# Patient Record
Sex: FEMALE | Race: BLACK OR AFRICAN AMERICAN | NOT HISPANIC OR LATINO | Employment: FULL TIME | URBAN - METROPOLITAN AREA
[De-identification: names, ages, dates, MRNs, and addresses within clinical notes are randomized per-mention and may not be internally consistent; named-entity substitution may affect disease eponyms.]

---

## 2018-01-21 ENCOUNTER — HOSPITAL ENCOUNTER (EMERGENCY)
Facility: HOSPITAL | Age: 43
Discharge: HOME | End: 2018-01-21
Attending: EMERGENCY MEDICINE
Payer: MEDICAID

## 2018-01-21 VITALS
RESPIRATION RATE: 18 BRPM | TEMPERATURE: 98 F | HEART RATE: 94 BPM | BODY MASS INDEX: 30.9 KG/M2 | WEIGHT: 180 LBS | SYSTOLIC BLOOD PRESSURE: 128 MMHG | OXYGEN SATURATION: 100 % | DIASTOLIC BLOOD PRESSURE: 73 MMHG

## 2018-01-21 DIAGNOSIS — N93.8 DYSFUNCTIONAL UTERINE BLEEDING: Primary | ICD-10-CM

## 2018-01-21 LAB
B-HCG UR QL: NEGATIVE
BACTERIA #/AREA URNS AUTO: ABNORMAL /HPF
BACTERIA GENITAL QL WET PREP: ABNORMAL
BASOPHILS # BLD AUTO: 0.04 K/UL
BASOPHILS NFR BLD: 0.6 %
BILIRUB UR QL STRIP: NEGATIVE
CLARITY UR REFRACT.AUTO: CLEAR
CLUE CELLS VAG QL WET PREP: ABNORMAL
COLOR UR AUTO: ABNORMAL
DIFFERENTIAL METHOD: ABNORMAL
EOSINOPHIL # BLD AUTO: 0.1 K/UL
EOSINOPHIL NFR BLD: 0.9 %
ERYTHROCYTE [DISTWIDTH] IN BLOOD BY AUTOMATED COUNT: 14.3 %
FILAMENT FUNGI VAG WET PREP-#/AREA: ABNORMAL
GLUCOSE UR QL STRIP: NEGATIVE
HCT VFR BLD AUTO: 35.7 %
HGB BLD-MCNC: 11.8 G/DL
HGB UR QL STRIP: ABNORMAL
HYALINE CASTS UR QL AUTO: 0 /LPF
KETONES UR QL STRIP: ABNORMAL
LEUKOCYTE ESTERASE UR QL STRIP: ABNORMAL
LYMPHOCYTES # BLD AUTO: 1.9 K/UL
LYMPHOCYTES NFR BLD: 28 %
MCH RBC QN AUTO: 31.1 PG
MCHC RBC AUTO-ENTMCNC: 33.1 G/DL
MCV RBC AUTO: 94 FL
MICROSCOPIC COMMENT: ABNORMAL
MONOCYTES # BLD AUTO: 0.7 K/UL
MONOCYTES NFR BLD: 10.4 %
NEUTROPHILS # BLD AUTO: 4.1 K/UL
NEUTROPHILS NFR BLD: 60 %
NITRITE UR QL STRIP: NEGATIVE
PH UR STRIP: 8 [PH] (ref 5–8)
PLATELET # BLD AUTO: 389 K/UL
PMV BLD AUTO: 11 FL
PROT UR QL STRIP: ABNORMAL
RBC # BLD AUTO: 3.8 M/UL
RBC #/AREA URNS AUTO: >100 /HPF (ref 0–4)
SP GR UR STRIP: 1.01 (ref 1–1.03)
SPECIMEN SOURCE: ABNORMAL
T VAGINALIS GENITAL QL WET PREP: ABNORMAL
URN SPEC COLLECT METH UR: ABNORMAL
UROBILINOGEN UR STRIP-ACNC: ABNORMAL EU/DL
WBC # BLD AUTO: 6.85 K/UL
WBC #/AREA URNS AUTO: 5 /HPF (ref 0–5)
WBC #/AREA VAG WET PREP: ABNORMAL
YEAST GENITAL QL WET PREP: ABNORMAL

## 2018-01-21 PROCEDURE — 87210 SMEAR WET MOUNT SALINE/INK: CPT

## 2018-01-21 PROCEDURE — 99284 EMERGENCY DEPT VISIT MOD MDM: CPT

## 2018-01-21 PROCEDURE — 85025 COMPLETE CBC W/AUTO DIFF WBC: CPT

## 2018-01-21 PROCEDURE — 81000 URINALYSIS NONAUTO W/SCOPE: CPT

## 2018-01-21 PROCEDURE — 81025 URINE PREGNANCY TEST: CPT

## 2018-01-21 RX ORDER — PRAZOSIN HYDROCHLORIDE 1 MG/1
1 CAPSULE ORAL 2 TIMES DAILY
COMMUNITY

## 2018-01-21 RX ORDER — TRAZODONE HYDROCHLORIDE 50 MG/1
50 TABLET ORAL NIGHTLY
COMMUNITY

## 2018-01-21 NOTE — ED PROVIDER NOTES
Encounter Date: 2018       History     Chief Complaint   Patient presents with    Fibroids     pt reports pain to fibroid tumors and excessive bleeding x 2 days that has progressively worsened. Denies N/V/D. Pt reports saturating 4-5 pads/hour.     This patient is a 42-year-old female.  Presents to the emergency department complaining of vaginal bleeding for the last 6 days.  She has a history of dysfunctional uterine bleeding secondary to fibroids, and reports that on average she has 2 episodes of bleeding per month.  She has no appointment with her OB/GYN doctor tomorrow.  She has received blood transfusions 3 times in the past due to heavy bleeding, the most recent was about 2 years ago.  She is  5 para 3024, 3 gestations delivered by , one was twins.  2 spontaneous miscarriages.  Has had a BTL.          Review of patient's allergies indicates:  No Known Allergies  Past Medical History:   Diagnosis Date    Anemia     Fibroid (bleeding) (uterine)     GERD (gastroesophageal reflux disease)     Hypertension      Past Surgical History:   Procedure Laterality Date     SECTION       No family history on file.  Social History   Substance Use Topics    Smoking status: Current Some Day Smoker     Packs/day: 0.50     Types: Cigarettes    Smokeless tobacco: Not on file    Alcohol use Not on file     Review of Systems   Constitutional: Negative for chills and fever.   Genitourinary: Positive for pelvic pain and vaginal bleeding.       Physical Exam     Initial Vitals [18 1612]   BP Pulse Resp Temp SpO2   128/66 96 (!) 21 98.4 °F (36.9 °C) 100 %      MAP       86.67         Physical Exam    Nursing note and vitals reviewed.  Constitutional: She appears well-developed and well-nourished.   Obese female in no apparent distress   Abdominal: She exhibits no distension. There is tenderness (Suprapubic tenderness).   Genitourinary: There is no rash, tenderness or lesion on the right  labia. There is no rash, tenderness or lesion on the left labia. Uterus is enlarged and tender. Cervix exhibits no discharge and no friability. Right adnexum displays no mass, no tenderness and no fullness. Left adnexum displays no mass, no tenderness and no fullness. There is bleeding in the vagina.   Genitourinary Comments: Uterine fundus is irregular, tender, 16 - 18 weeks size   Skin: Skin is warm and dry.   Psychiatric: She has a normal mood and affect.         ED Course   Procedures  Labs Reviewed   VAGINAL SCREEN - Abnormal; Notable for the following:        Result Value    WBC - Vaginal Screen Occasional (*)     Bacteria - Vaginal Screen Occasional (*)     All other components within normal limits   URINALYSIS - Abnormal; Notable for the following:     Protein, UA 1+ (*)     Ketones, UA 1+ (*)     Occult Blood UA 3+ (*)     Urobilinogen, UA 4.0-6.0 (*)     Leukocytes, UA 1+ (*)     All other components within normal limits   CBC W/ AUTO DIFFERENTIAL - Abnormal; Notable for the following:     RBC 3.80 (*)     Hemoglobin 11.8 (*)     Hematocrit 35.7 (*)     MCH 31.1 (*)     Platelets 389 (*)     All other components within normal limits   URINALYSIS MICROSCOPIC - Abnormal; Notable for the following:     RBC, UA >100 (*)     All other components within normal limits   PREGNANCY TEST, URINE RAPID         Results for orders placed or performed during the hospital encounter of 01/21/18   Vaginal Screen Vagina   Result Value Ref Range    Trichomonas None None    Clue Cells, Wet Prep None None    Budding Yeast None None    Fungal Hyphae None None    WBC - Vaginal Screen Occasional (A) None    Bacteria - Vaginal Screen Occasional (A) None    Wet Prep Source Vagina None   Urinalysis   Result Value Ref Range    Specimen UA Urine, Clean Catch     Color, UA Michaela Yellow, Straw, Michaela    Appearance, UA Clear Clear    pH, UA 8.0 5.0 - 8.0    Specific Gravity, UA 1.010 1.005 - 1.030    Protein, UA 1+ (A) Negative     Glucose, UA Negative Negative    Ketones, UA 1+ (A) Negative    Bilirubin (UA) Negative Negative    Occult Blood UA 3+ (A) Negative    Nitrite, UA Negative Negative    Urobilinogen, UA 4.0-6.0 (A) <2.0 EU/dL    Leukocytes, UA 1+ (A) Negative   Pregnancy, urine rapid   Result Value Ref Range    Preg Test, Ur Negative    CBC auto differential   Result Value Ref Range    WBC 6.85 3.90 - 12.70 K/uL    RBC 3.80 (L) 4.00 - 5.40 M/uL    Hemoglobin 11.8 (L) 12.0 - 16.0 g/dL    Hematocrit 35.7 (L) 37.0 - 48.5 %    MCV 94 82 - 98 fL    MCH 31.1 (H) 27.0 - 31.0 pg    MCHC 33.1 32.0 - 36.0 g/dL    RDW 14.3 11.5 - 14.5 %    Platelets 389 (H) 150 - 350 K/uL    MPV 11.0 9.2 - 12.9 fL    Gran # 4.1 1.8 - 7.7 K/uL    Lymph # 1.9 1.0 - 4.8 K/uL    Mono # 0.7 0.3 - 1.0 K/uL    Eos # 0.1 0.0 - 0.5 K/uL    Baso # 0.04 0.00 - 0.20 K/uL    Gran% 60.0 38.0 - 73.0 %    Lymph% 28.0 18.0 - 48.0 %    Mono% 10.4 4.0 - 15.0 %    Eosinophil% 0.9 0.0 - 8.0 %    Basophil% 0.6 0.0 - 1.9 %    Differential Method Automated    Urinalysis Microscopic   Result Value Ref Range    RBC, UA >100 (H) 0 - 4 /hpf    WBC, UA 5 0 - 5 /hpf    Bacteria, UA Rare None-Occ /hpf    Hyaline Casts, UA 0 0-1/lpf /lpf    Microscopic Comment SEE COMMENT           Medical Decision Making:   Initial Assessment:   Dysfunctional uterine bleeding secondary to fibroids.  She is hemodynamically stable, and her hematocrit is 35.7.  On exam, she has light bleeding, no clots, no heavy hemorrhage.  Recommended that she keep her follow-up appointment with her gynecologist tomorrow.  Return for any problems.                   ED Course      Clinical Impression:   The encounter diagnosis was Dysfunctional uterine bleeding.    Disposition:   Disposition: Discharged  Condition: Stable                        Rober Dudley MD  01/21/18 0382

## 2018-01-21 NOTE — ED TRIAGE NOTES
pt reports pain to fibroid tumors and excessive bleeding x 2 days that has progressively worsened. Denies N/V/D. Pt reports saturating 4-5 pads/hour.

## 2025-06-24 ENCOUNTER — HOSPITAL ENCOUNTER (INPATIENT)
Facility: HOSPITAL | Age: 50
LOS: 1 days | Discharge: ANOTHER HOSPITAL | DRG: 065 | End: 2025-06-25
Attending: EMERGENCY MEDICINE | Admitting: FAMILY MEDICINE
Payer: COMMERCIAL

## 2025-06-24 DIAGNOSIS — I63.9 CVA (CEREBRAL VASCULAR ACCIDENT): ICD-10-CM

## 2025-06-24 DIAGNOSIS — R29.818 ACUTE FOCAL NEUROLOGICAL DEFICIT: ICD-10-CM

## 2025-06-24 LAB
ABSOLUTE EOSINOPHIL (OHS): 0 K/UL
ABSOLUTE MONOCYTE (OHS): 0.49 K/UL (ref 0.3–1)
ABSOLUTE NEUTROPHIL COUNT (OHS): 6.5 K/UL (ref 1.8–7.7)
ALBUMIN SERPL BCP-MCNC: 4.3 G/DL (ref 3.5–5.2)
ALP SERPL-CCNC: 83 UNIT/L (ref 38–126)
ALT SERPL W/O P-5'-P-CCNC: 11 UNIT/L (ref 10–44)
AMPHET UR QL SCN: NEGATIVE
ANION GAP (OHS): 9 MMOL/L (ref 8–16)
AST SERPL-CCNC: 19 UNIT/L (ref 15–46)
B-HCG UR QL: NEGATIVE
BARBITURATE SCN PRESENT UR: NEGATIVE
BASOPHILS # BLD AUTO: 0.05 K/UL
BASOPHILS NFR BLD AUTO: 0.6 %
BENZODIAZ UR QL SCN: NEGATIVE
BILIRUB SERPL-MCNC: 0.6 MG/DL (ref 0.1–1)
BUN SERPL-MCNC: 10 MG/DL (ref 7–17)
CALCIUM SERPL-MCNC: 9.2 MG/DL (ref 8.7–10.5)
CANNABINOIDS UR QL SCN: NEGATIVE
CHLORIDE SERPL-SCNC: 104 MMOL/L (ref 95–110)
CO2 SERPL-SCNC: 26 MMOL/L (ref 23–29)
COCAINE UR QL SCN: ABNORMAL
CREAT SERPL-MCNC: 0.4 MG/DL (ref 0.5–1.4)
CREAT UR-MCNC: 66.4 MG/DL (ref 15–325)
CTP QC/QA: YES
ERYTHROCYTE [DISTWIDTH] IN BLOOD BY AUTOMATED COUNT: 13 % (ref 11.5–14.5)
GFR SERPLBLD CREATININE-BSD FMLA CKD-EPI: >60 ML/MIN/1.73/M2
GLUCOSE SERPL-MCNC: 102 MG/DL (ref 70–110)
HCT VFR BLD AUTO: 42.2 % (ref 37–48.5)
HGB BLD-MCNC: 14.4 GM/DL (ref 12–16)
IMM GRANULOCYTES # BLD AUTO: 0.02 K/UL (ref 0–0.04)
IMM GRANULOCYTES NFR BLD AUTO: 0.2 % (ref 0–0.5)
INR PPP: 1.1 (ref 0.8–1.2)
LYMPHOCYTES # BLD AUTO: 1.89 K/UL (ref 1–4.8)
MCH RBC QN AUTO: 31 PG (ref 27–31)
MCHC RBC AUTO-ENTMCNC: 34.1 G/DL (ref 32–36)
MCV RBC AUTO: 91 FL (ref 82–98)
METHADONE UR QL SCN: NEGATIVE
NUCLEATED RBC (/100WBC) (OHS): 0 /100 WBC
OPIATES UR QL SCN: NEGATIVE
PCP UR QL: NEGATIVE
PLATELET # BLD AUTO: 303 K/UL (ref 150–450)
PMV BLD AUTO: 10.3 FL (ref 9.2–12.9)
POTASSIUM SERPL-SCNC: 3.5 MMOL/L (ref 3.5–5.1)
PROT SERPL-MCNC: 8.1 GM/DL (ref 6–8.4)
PROTHROMBIN TIME: 11.8 SECONDS (ref 9–12.5)
RBC # BLD AUTO: 4.64 M/UL (ref 4–5.4)
RELATIVE EOSINOPHIL (OHS): 0 %
RELATIVE LYMPHOCYTE (OHS): 21.1 % (ref 18–48)
RELATIVE MONOCYTE (OHS): 5.5 % (ref 4–15)
RELATIVE NEUTROPHIL (OHS): 72.6 % (ref 38–73)
SODIUM SERPL-SCNC: 139 MMOL/L (ref 136–145)
TSH SERPL-ACNC: 0.35 UIU/ML (ref 0.4–4)
WBC # BLD AUTO: 8.95 K/UL (ref 3.9–12.7)

## 2025-06-24 PROCEDURE — 93005 ELECTROCARDIOGRAM TRACING: CPT | Mod: ER

## 2025-06-24 PROCEDURE — 81025 URINE PREGNANCY TEST: CPT | Mod: ER | Performed by: EMERGENCY MEDICINE

## 2025-06-24 PROCEDURE — 80061 LIPID PANEL: CPT | Mod: ER | Performed by: EMERGENCY MEDICINE

## 2025-06-24 PROCEDURE — 94760 N-INVAS EAR/PLS OXIMETRY 1: CPT | Mod: ER

## 2025-06-24 PROCEDURE — 80307 DRUG TEST PRSMV CHEM ANLYZR: CPT | Mod: ER | Performed by: EMERGENCY MEDICINE

## 2025-06-24 PROCEDURE — 63600175 PHARM REV CODE 636 W HCPCS: Mod: ER | Performed by: EMERGENCY MEDICINE

## 2025-06-24 PROCEDURE — 84439 ASSAY OF FREE THYROXINE: CPT | Mod: ER | Performed by: EMERGENCY MEDICINE

## 2025-06-24 PROCEDURE — 93010 ELECTROCARDIOGRAM REPORT: CPT | Mod: ,,, | Performed by: INTERNAL MEDICINE

## 2025-06-24 PROCEDURE — 27000221 HC OXYGEN, UP TO 24 HOURS: Mod: ER

## 2025-06-24 PROCEDURE — 85025 COMPLETE CBC W/AUTO DIFF WBC: CPT | Mod: ER | Performed by: EMERGENCY MEDICINE

## 2025-06-24 PROCEDURE — 85610 PROTHROMBIN TIME: CPT | Mod: ER | Performed by: EMERGENCY MEDICINE

## 2025-06-24 PROCEDURE — 80053 COMPREHEN METABOLIC PANEL: CPT | Mod: ER | Performed by: EMERGENCY MEDICINE

## 2025-06-24 PROCEDURE — 25000003 PHARM REV CODE 250: Mod: ER | Performed by: EMERGENCY MEDICINE

## 2025-06-24 PROCEDURE — 99447 NTRPROF PH1/NTRNET/EHR 11-20: CPT | Mod: ,,, | Performed by: STUDENT IN AN ORGANIZED HEALTH CARE EDUCATION/TRAINING PROGRAM

## 2025-06-24 PROCEDURE — 84443 ASSAY THYROID STIM HORMONE: CPT | Mod: ER | Performed by: EMERGENCY MEDICINE

## 2025-06-24 PROCEDURE — 25500020 PHARM REV CODE 255: Mod: ER | Performed by: EMERGENCY MEDICINE

## 2025-06-24 PROCEDURE — 12000002 HC ACUTE/MED SURGE SEMI-PRIVATE ROOM

## 2025-06-24 RX ORDER — NAPROXEN SODIUM 220 MG/1
324 TABLET, FILM COATED ORAL
Status: COMPLETED | OUTPATIENT
Start: 2025-06-24 | End: 2025-06-24

## 2025-06-24 RX ORDER — DIPHENHYDRAMINE HYDROCHLORIDE 50 MG/ML
50 INJECTION, SOLUTION INTRAMUSCULAR; INTRAVENOUS
Status: COMPLETED | OUTPATIENT
Start: 2025-06-24 | End: 2025-06-24

## 2025-06-24 RX ORDER — CLOPIDOGREL BISULFATE 75 MG/1
300 TABLET ORAL
Status: COMPLETED | OUTPATIENT
Start: 2025-06-24 | End: 2025-06-24

## 2025-06-24 RX ORDER — MORPHINE SULFATE 4 MG/ML
2 INJECTION, SOLUTION INTRAMUSCULAR; INTRAVENOUS
Refills: 0 | Status: COMPLETED | OUTPATIENT
Start: 2025-06-24 | End: 2025-06-24

## 2025-06-24 RX ADMIN — DIPHENHYDRAMINE HYDROCHLORIDE 50 MG: 50 INJECTION INTRAMUSCULAR; INTRAVENOUS at 09:06

## 2025-06-24 RX ADMIN — ASPIRIN 81 MG CHEWABLE TABLET 324 MG: 81 TABLET CHEWABLE at 08:06

## 2025-06-24 RX ADMIN — MORPHINE SULFATE 2 MG: 4 INJECTION INTRAVENOUS at 08:06

## 2025-06-24 RX ADMIN — CLOPIDOGREL 300 MG: 75 TABLET ORAL at 08:06

## 2025-06-24 RX ADMIN — IOHEXOL 75 ML: 350 INJECTION, SOLUTION INTRAVENOUS at 07:06

## 2025-06-24 NOTE — TELEMEDICINE CONSULT
[facility]  Vascular Neurology  Comprehensive Stroke Center  TeleVascular Neurology Interprofessional Consult Note           Consult Information  Consult to Telemedicine - Acute Stroke  Consult performed by: Burton Lee MD  Consult ordered by: Nicolette Burris MD          Consulting Provider: NICOLETTE BURRIS   Patient Location:  [facility] EMERGENCY DEPARTMENT     Summary of patient's symptoms:  50 y/o female with a history of HTN, fibroids who presents with left sided weakness, paresthesias, facial droop and slurred speech.    LKN 10 pm yesterday. Woke up at 3pm with the aforementioned symptoms.      Exam ->  LS plegia, sensory deficit.  L facial droop and slurred speech.     Images personally reviewed and interpreted:  Study: Head CT  Study Interpretation: Multiple scattered infarcts in the R hemisphere.     Assessment and plan:  50 y/o female with the above history who presents with LS weakness/paresthesias, facial droop and slurred speech.      OOW for IV thrombolytics.  Potentially a WAKE-UP candidate, however, no emergent MRI capabilities available at spoke site.    -- Plavix load of 300mg x1 and ASA load of 325mg x1 now followed by dual antiplatelet therapy with ASA 81mg + Plavix 75mg x21 day, followed by monotherapy with ASA 81mg thereafter.  If already taking ASA, add Plavix with load similar to above, or, if already on Plavix, add ASA load similar to above and then dual antiplatelet therapy x21 days followed by monotherapy.  If unable to swallow, please administer rectal aspirin 300 mg until cleared by Speech therapy.    -- CTA head and neck STAT to evaluate for the presence of an intracranial LVO or high grade EC vascular pathology for further risk stratification and management. If noted, please call [facility] to discuss if patient needs transfer for higher level care.  -- MRI brain. Permissive HTN w/ BP<220/120 for the next 48-72 hrs.  -- HOB flat   -- Lipitor 40mg daily.    -- Check Lipid panel, A1c, Utox  -- Target LDL<70, A1c<7   -- TTE w/ bubble   -- PT/OT/SLP    Discussed recommendations w/ attending physician.   If MD unavailable, recommendations will be conveyed to patient's nurse.          I spent approximately 15 minutes on this encounter. More than half of that time was spent communicating with the consulting provider and coordinating patient care.       Burton Lee MD        This encounter was conducted as an interprofessional communication between providers at the [facility] and vascular neurologist. The interaction was completed over the phone or via secure messaging (electronic medical record - [employer] Secure Chat).     Once this note was completed, a written copy was sent back to the provider via fax or electronic medical record.

## 2025-06-24 NOTE — Clinical Note
Diagnosis: Acute focal neurological deficit [048696]   Reason for IP Medical Treatment  (Clinical interventions that can only be accomplished in the IP setting? ) :: MRI, echocardiogram, Neurology consultation, therapy

## 2025-06-25 ENCOUNTER — HOSPITAL ENCOUNTER (INPATIENT)
Facility: HOSPITAL | Age: 50
LOS: 6 days | Discharge: NURSING, REHAB OR LONG-TERM CARE | DRG: 064 | End: 2025-07-01
Attending: PSYCHIATRY & NEUROLOGY | Admitting: PSYCHIATRY & NEUROLOGY
Payer: COMMERCIAL

## 2025-06-25 ENCOUNTER — RESULTS FOLLOW-UP (OUTPATIENT)
Dept: EMERGENCY MEDICINE | Facility: HOSPITAL | Age: 50
End: 2025-06-25

## 2025-06-25 VITALS
WEIGHT: 137 LBS | BODY MASS INDEX: 23.39 KG/M2 | SYSTOLIC BLOOD PRESSURE: 143 MMHG | RESPIRATION RATE: 15 BRPM | HEART RATE: 60 BPM | OXYGEN SATURATION: 98 % | TEMPERATURE: 99 F | HEIGHT: 64 IN | DIASTOLIC BLOOD PRESSURE: 77 MMHG

## 2025-06-25 DIAGNOSIS — F14.90 COCAINE USE: ICD-10-CM

## 2025-06-25 DIAGNOSIS — I63.511 CEREBROVASCULAR ACCIDENT (CVA) DUE TO OCCLUSION OF RIGHT MIDDLE CEREBRAL ARTERY: Primary | ICD-10-CM

## 2025-06-25 DIAGNOSIS — E87.6 HYPOKALEMIA: ICD-10-CM

## 2025-06-25 DIAGNOSIS — I63.9 ACUTE ISCHEMIC STROKE: ICD-10-CM

## 2025-06-25 DIAGNOSIS — I63.9 STROKE: ICD-10-CM

## 2025-06-25 PROBLEM — F19.90 SUBSTANCE USE: Status: ACTIVE | Noted: 2025-06-25

## 2025-06-25 PROBLEM — I10 ESSENTIAL HYPERTENSION: Status: ACTIVE | Noted: 2025-06-25

## 2025-06-25 PROBLEM — F17.200 TOBACCO DEPENDENCY: Status: ACTIVE | Noted: 2025-06-25

## 2025-06-25 LAB
AORTIC SIZE INDEX (SOV): 1.7 CM/M2
AORTIC SIZE INDEX: 1.8 CM/M2
APICAL FOUR CHAMBER EJECTION FRACTION: 55 %
APICAL TWO CHAMBER EJECTION FRACTION: 67 %
APTT PPP: 26.1 SECONDS (ref 21–32)
ASCENDING AORTA: 3 CM
AV INDEX (PROSTH): 0.7
AV MEAN GRADIENT: 8 MMHG
AV PEAK GRADIENT: 14 MMHG
AV REGURGITATION PRESSURE HALF TIME: 769 MS
AV VALVE AREA BY VELOCITY RATIO: 2 CM²
AV VALVE AREA: 2.4 CM²
AV VELOCITY RATIO: 0.58
BSA FOR ECHO PROCEDURE: 1.68 M2
CHOLEST SERPL-MCNC: 236 MG/DL (ref 120–199)
CHOLEST/HDLC SERPL: 3.2 {RATIO} (ref 2–5)
CV ECHO LV RWT: 0.3 CM
DOP CALC AO PEAK VEL: 1.9 M/S
DOP CALC AO VTI: 37.9 CM
DOP CALC LVOT AREA: 3.5 CM2
DOP CALC LVOT DIAMETER: 2.1 CM
DOP CALC LVOT PEAK VEL: 1.1 M/S
DOP CALC LVOT STROKE VOLUME: 92.1 CM3
DOP CALC MV VTI: 25.3 CM
DOP CALCLVOT PEAK VEL VTI: 26.6 CM
E WAVE DECELERATION TIME: 212 MSEC
E/A RATIO: 0.75
E/E' RATIO: 9 M/S
EAG (OHS): 111 MG/DL (ref 68–131)
ECHO LV POSTERIOR WALL: 0.7 CM (ref 0.6–1.1)
FRACTIONAL SHORTENING: 32.6 % (ref 28–44)
HBA1C MFR BLD: 5.5 % (ref 4–5.6)
HDLC SERPL-MCNC: 74 MG/DL (ref 40–75)
HDLC SERPL: 31.4 % (ref 20–50)
INR PPP: 1.1 (ref 0.8–1.2)
INTERVENTRICULAR SEPTUM: 0.8 CM (ref 0.6–1.1)
IVC DIAMETER: 1.28 CM
LDLC SERPL CALC-MCNC: 152.4 MG/DL (ref 63–159)
LEFT ATRIUM AREA SYSTOLIC (APICAL 2 CHAMBER): 18.26 CM2
LEFT ATRIUM AREA SYSTOLIC (APICAL 4 CHAMBER): 18.18 CM2
LEFT ATRIUM VOLUME INDEX MOD: 31 ML/M2
LEFT ATRIUM VOLUME MOD: 52 ML
LEFT INTERNAL DIMENSION IN SYSTOLE: 3.1 CM (ref 2.1–4)
LEFT VENTRICLE DIASTOLIC VOLUME INDEX: 56.89 ML/M2
LEFT VENTRICLE DIASTOLIC VOLUME: 95 ML
LEFT VENTRICLE END DIASTOLIC VOLUME APICAL 2 CHAMBER: 73.47 ML
LEFT VENTRICLE END DIASTOLIC VOLUME APICAL 4 CHAMBER: 73.97 ML
LEFT VENTRICLE END SYSTOLIC VOLUME APICAL 2 CHAMBER: 49.66 ML
LEFT VENTRICLE END SYSTOLIC VOLUME APICAL 4 CHAMBER: 51.39 ML
LEFT VENTRICLE MASS INDEX: 64.9 G/M2
LEFT VENTRICLE SYSTOLIC VOLUME INDEX: 23.4 ML/M2
LEFT VENTRICLE SYSTOLIC VOLUME: 39 ML
LEFT VENTRICULAR INTERNAL DIMENSION IN DIASTOLE: 4.6 CM (ref 3.5–6)
LEFT VENTRICULAR MASS: 108.5 G
LV LATERAL E/E' RATIO: 7.7 M/S
LV SEPTAL E/E' RATIO: 11.5 M/S
LVED V (TEICH): 95.47 ML
LVES V (TEICH): 39.25 ML
LVOT MG: 2.52 MMHG
LVOT MV: 0.74 CM/S
MV MEAN GRADIENT: 1 MMHG
MV PEAK A VEL: 0.92 M/S
MV PEAK E VEL: 0.69 M/S
MV PEAK GRADIENT: 3 MMHG
MV STENOSIS PRESSURE HALF TIME: 61.5 MS
MV VALVE AREA BY CONTINUITY EQUATION: 3.64 CM2
MV VALVE AREA P 1/2 METHOD: 3.58 CM2
NONHDLC SERPL-MCNC: 162 MG/DL
OHS CV RV/LV RATIO: 0.74 CM
OHS LV EJECTION FRACTION SIMPSONS BIPLANE MOD: 62 %
OHS QRS DURATION: 90 MS
OHS QTC CALCULATION: 481 MS
PISA AR MAX VEL: 5.58 M/S
PISA MRMAX VEL: 5.15 M/S
PISA TR MAX VEL: 1.9 M/S
POCT GLUCOSE: 105 MG/DL (ref 70–110)
PROTHROMBIN TIME: 12 SECONDS (ref 9–12.5)
PULM VEIN S/D RATIO: 1.26
PV MV: 0.74 M/S
PV PEAK D VEL: 0.39 M/S
PV PEAK GRADIENT: 4 MMHG
PV PEAK S VEL: 0.49 M/S
PV PEAK VELOCITY: 1 M/S
RA PRESSURE ESTIMATED: 3 MMHG
RA VOL SYS: 39.57 ML
RIGHT ATRIAL AREA: 15.4 CM2
RIGHT ATRIUM END SYSTOLIC VOLUME APICAL 4 CHAMBER INDEX BSA: 22.74 ML/M2
RIGHT ATRIUM VOLUME AREA LENGTH APICAL 4 CHAMBER: 37.97 ML
RIGHT VENTRICLE DIASTOLIC BASEL DIMENSION: 3.4 CM
RV TB RVSP: 5 MMHG
RV TISSUE DOPPLER FREE WALL SYSTOLIC VELOCITY 1 (APICAL 4 CHAMBER VIEW): 15.51 CM/S
SINUS: 2.8 CM
STJ: 2.8 CM
T4 FREE SERPL-MCNC: 1.08 NG/DL (ref 0.71–1.51)
TDI LATERAL: 0.09 M/S
TDI SEPTAL: 0.06 M/S
TDI: 0.08 M/S
TR MAX PG: 14 MMHG
TRICUSPID ANNULAR PLANE SYSTOLIC EXCURSION: 2.3 CM
TRIGL SERPL-MCNC: 48 MG/DL (ref 30–150)
TROPONIN I SERPL DL<=0.01 NG/ML-MCNC: 0.01 NG/ML
TV REST PULMONARY ARTERY PRESSURE: 17 MMHG
Z-SCORE OF LEFT VENTRICULAR DIMENSION IN END DIASTOLE: -0.15
Z-SCORE OF LEFT VENTRICULAR DIMENSION IN END SYSTOLE: 0.55

## 2025-06-25 PROCEDURE — 97530 THERAPEUTIC ACTIVITIES: CPT

## 2025-06-25 PROCEDURE — 20000000 HC ICU ROOM

## 2025-06-25 PROCEDURE — 25000003 PHARM REV CODE 250

## 2025-06-25 PROCEDURE — 99285 EMERGENCY DEPT VISIT HI MDM: CPT | Mod: 25

## 2025-06-25 PROCEDURE — 84484 ASSAY OF TROPONIN QUANT: CPT | Performed by: REGISTERED NURSE

## 2025-06-25 PROCEDURE — 97535 SELF CARE MNGMENT TRAINING: CPT

## 2025-06-25 PROCEDURE — 83036 HEMOGLOBIN GLYCOSYLATED A1C: CPT | Performed by: REGISTERED NURSE

## 2025-06-25 PROCEDURE — 85610 PROTHROMBIN TIME: CPT | Performed by: REGISTERED NURSE

## 2025-06-25 PROCEDURE — 92610 EVALUATE SWALLOWING FUNCTION: CPT

## 2025-06-25 PROCEDURE — 36415 COLL VENOUS BLD VENIPUNCTURE: CPT | Performed by: REGISTERED NURSE

## 2025-06-25 PROCEDURE — 25000003 PHARM REV CODE 250: Performed by: REGISTERED NURSE

## 2025-06-25 PROCEDURE — 97162 PT EVAL MOD COMPLEX 30 MIN: CPT

## 2025-06-25 PROCEDURE — 99223 1ST HOSP IP/OBS HIGH 75: CPT | Mod: ,,,

## 2025-06-25 PROCEDURE — 85730 THROMBOPLASTIN TIME PARTIAL: CPT | Performed by: REGISTERED NURSE

## 2025-06-25 PROCEDURE — 63600175 PHARM REV CODE 636 W HCPCS: Performed by: REGISTERED NURSE

## 2025-06-25 PROCEDURE — 94761 N-INVAS EAR/PLS OXIMETRY MLT: CPT

## 2025-06-25 PROCEDURE — 99223 1ST HOSP IP/OBS HIGH 75: CPT | Mod: ,,, | Performed by: STUDENT IN AN ORGANIZED HEALTH CARE EDUCATION/TRAINING PROGRAM

## 2025-06-25 PROCEDURE — 63600175 PHARM REV CODE 636 W HCPCS

## 2025-06-25 PROCEDURE — 99291 CRITICAL CARE FIRST HOUR: CPT | Mod: ,,, | Performed by: PSYCHIATRY & NEUROLOGY

## 2025-06-25 PROCEDURE — 97165 OT EVAL LOW COMPLEX 30 MIN: CPT

## 2025-06-25 RX ORDER — CLOPIDOGREL BISULFATE 75 MG/1
75 TABLET ORAL DAILY
Status: DISCONTINUED | OUTPATIENT
Start: 2025-06-26 | End: 2025-07-01 | Stop reason: HOSPADM

## 2025-06-25 RX ORDER — ATORVASTATIN CALCIUM 40 MG/1
40 TABLET, FILM COATED ORAL DAILY
Qty: 90 TABLET | Refills: 3 | Status: ON HOLD | OUTPATIENT
Start: 2025-06-26 | End: 2026-06-26

## 2025-06-25 RX ORDER — ASPIRIN 81 MG/1
81 TABLET ORAL DAILY
Status: DISCONTINUED | OUTPATIENT
Start: 2025-06-26 | End: 2025-07-01 | Stop reason: HOSPADM

## 2025-06-25 RX ORDER — LABETALOL HCL 20 MG/4 ML
10 SYRINGE (ML) INTRAVENOUS EVERY 6 HOURS PRN
Status: DISCONTINUED | OUTPATIENT
Start: 2025-06-25 | End: 2025-07-01 | Stop reason: HOSPADM

## 2025-06-25 RX ORDER — SODIUM CHLORIDE 0.9 % (FLUSH) 0.9 %
10 SYRINGE (ML) INJECTION
Status: DISCONTINUED | OUTPATIENT
Start: 2025-06-25 | End: 2025-06-25 | Stop reason: HOSPADM

## 2025-06-25 RX ORDER — ATORVASTATIN CALCIUM 40 MG/1
40 TABLET, FILM COATED ORAL DAILY
Status: DISCONTINUED | OUTPATIENT
Start: 2025-06-25 | End: 2025-06-25 | Stop reason: HOSPADM

## 2025-06-25 RX ORDER — ENOXAPARIN SODIUM 100 MG/ML
40 INJECTION SUBCUTANEOUS EVERY 24 HOURS
Status: DISCONTINUED | OUTPATIENT
Start: 2025-06-25 | End: 2025-06-25 | Stop reason: HOSPADM

## 2025-06-25 RX ORDER — CLOPIDOGREL BISULFATE 75 MG/1
75 TABLET ORAL DAILY
Status: DISCONTINUED | OUTPATIENT
Start: 2025-06-25 | End: 2025-06-25 | Stop reason: HOSPADM

## 2025-06-25 RX ORDER — ONDANSETRON HYDROCHLORIDE 2 MG/ML
4 INJECTION, SOLUTION INTRAVENOUS EVERY 12 HOURS PRN
Status: DISCONTINUED | OUTPATIENT
Start: 2025-06-25 | End: 2025-06-25 | Stop reason: HOSPADM

## 2025-06-25 RX ORDER — ENOXAPARIN SODIUM 100 MG/ML
40 INJECTION SUBCUTANEOUS EVERY 24 HOURS
Status: DISCONTINUED | OUTPATIENT
Start: 2025-06-25 | End: 2025-07-01 | Stop reason: HOSPADM

## 2025-06-25 RX ORDER — CLOPIDOGREL BISULFATE 75 MG/1
75 TABLET ORAL DAILY
Qty: 21 TABLET | Refills: 0 | Status: ON HOLD | OUTPATIENT
Start: 2025-06-26 | End: 2025-07-17

## 2025-06-25 RX ORDER — ASPIRIN 81 MG/1
81 TABLET ORAL DAILY
Qty: 360 TABLET | Refills: 0 | Status: ON HOLD | OUTPATIENT
Start: 2025-06-26 | End: 2026-06-26

## 2025-06-25 RX ORDER — AMOXICILLIN 250 MG
1 CAPSULE ORAL 2 TIMES DAILY
Status: DISCONTINUED | OUTPATIENT
Start: 2025-06-25 | End: 2025-07-01 | Stop reason: HOSPADM

## 2025-06-25 RX ORDER — BISACODYL 10 MG/1
10 SUPPOSITORY RECTAL DAILY PRN
Status: DISCONTINUED | OUTPATIENT
Start: 2025-06-25 | End: 2025-06-25 | Stop reason: HOSPADM

## 2025-06-25 RX ORDER — OXYCODONE HYDROCHLORIDE 5 MG/1
5 TABLET ORAL ONCE
Refills: 0 | Status: COMPLETED | OUTPATIENT
Start: 2025-06-25 | End: 2025-06-25

## 2025-06-25 RX ORDER — ONDANSETRON HYDROCHLORIDE 2 MG/ML
4 INJECTION, SOLUTION INTRAVENOUS EVERY 8 HOURS PRN
Status: DISCONTINUED | OUTPATIENT
Start: 2025-06-25 | End: 2025-07-01 | Stop reason: HOSPADM

## 2025-06-25 RX ORDER — ACETAMINOPHEN 10 MG/ML
1000 INJECTION, SOLUTION INTRAVENOUS ONCE
Status: COMPLETED | OUTPATIENT
Start: 2025-06-25 | End: 2025-06-25

## 2025-06-25 RX ORDER — SODIUM CHLORIDE 0.9 % (FLUSH) 0.9 %
10 SYRINGE (ML) INJECTION
Status: DISCONTINUED | OUTPATIENT
Start: 2025-06-25 | End: 2025-07-01 | Stop reason: HOSPADM

## 2025-06-25 RX ORDER — ASPIRIN 81 MG/1
81 TABLET ORAL DAILY
Status: DISCONTINUED | OUTPATIENT
Start: 2025-06-25 | End: 2025-06-25 | Stop reason: HOSPADM

## 2025-06-25 RX ORDER — HYDRALAZINE HYDROCHLORIDE 20 MG/ML
10 INJECTION INTRAMUSCULAR; INTRAVENOUS EVERY 6 HOURS PRN
Status: DISCONTINUED | OUTPATIENT
Start: 2025-06-25 | End: 2025-06-25 | Stop reason: HOSPADM

## 2025-06-25 RX ORDER — ATORVASTATIN CALCIUM 40 MG/1
40 TABLET, FILM COATED ORAL DAILY
Status: DISCONTINUED | OUTPATIENT
Start: 2025-06-26 | End: 2025-07-01 | Stop reason: HOSPADM

## 2025-06-25 RX ORDER — BISACODYL 10 MG/1
10 SUPPOSITORY RECTAL DAILY PRN
Status: DISCONTINUED | OUTPATIENT
Start: 2025-06-25 | End: 2025-07-01 | Stop reason: HOSPADM

## 2025-06-25 RX ORDER — ACETAMINOPHEN 325 MG/1
650 TABLET ORAL EVERY 6 HOURS PRN
Status: DISCONTINUED | OUTPATIENT
Start: 2025-06-25 | End: 2025-07-01 | Stop reason: HOSPADM

## 2025-06-25 RX ADMIN — ACETAMINOPHEN 650 MG: 325 TABLET ORAL at 04:06

## 2025-06-25 RX ADMIN — ASPIRIN 81 MG: 81 TABLET, COATED ORAL at 09:06

## 2025-06-25 RX ADMIN — ENOXAPARIN SODIUM 40 MG: 40 INJECTION SUBCUTANEOUS at 05:06

## 2025-06-25 RX ADMIN — SENNOSIDES AND DOCUSATE SODIUM 1 TABLET: 50; 8.6 TABLET ORAL at 08:06

## 2025-06-25 RX ADMIN — ATORVASTATIN CALCIUM 40 MG: 40 TABLET, FILM COATED ORAL at 09:06

## 2025-06-25 RX ADMIN — CLOPIDOGREL 75 MG: 75 TABLET ORAL at 09:06

## 2025-06-25 RX ADMIN — ACETAMINOPHEN 1000 MG: 10 INJECTION INTRAVENOUS at 07:06

## 2025-06-25 RX ADMIN — OXYCODONE HYDROCHLORIDE 5 MG: 5 TABLET ORAL at 06:06

## 2025-06-25 NOTE — EICU
Virtual ICU Admission    Admit Date: 2025  LOS: 0  Code Status: Prior   : 1975  Bed: 9075/9075 A:     Diagnosis: <principal problem not specified>    Patient  has a past medical history of Anemia, Fibroid (bleeding) (uterine), GERD (gastroesophageal reflux disease), and Hypertension.    Last VS: BP (!) 145/82 (Patient Position: Lying)   Pulse 60   Temp 98.8 °F (37.1 °C) (Axillary)   Resp 17   Wt 60.5 kg (133 lb 6.1 oz)   LMP  (LMP Unknown)   SpO2 98%   BMI 22.89 kg/m²       VICU Review  VICU nurse assessment :  [place] [employer] [place] completed, LDA documentation reconciliation completed, Skin care/wounds LDA reconciliation, and VTE prophylaxis review.

## 2025-06-25 NOTE — PT/OT/SLP EVAL
Occupational Therapy   Evaluation    Name: Cadence Vasques  MRN: [id number]  Admitting Diagnosis: <principal problem not specified>  Recent Surgery: * No surgery found *      Recommendations:     Discharge Recommendations: High Intensity Therapy  Discharge Equipment Recommendations:  to be determined by next level of care  Barriers to discharge:  Other (Comment) (pt requires increased level of assist)    Assessment:     Cadence Vasques is a 49 y.o. female with a medical diagnosis of <principal problem not specified>.  She presents with The encounter diagnosis was Acute ischemic stroke. Performance deficits affecting function: weakness, impaired functional mobility, gait instability, impaired endurance, decreased upper extremity function, decreased lower extremity function, decreased ROM, impaired fine motor, impaired joint extensibility, decreased safety awareness, impaired balance, impaired self care skills, impaired sensation, decreased coordination, abnormal tone, pain, impaired coordination.      Rehab Prognosis: Good; patient would benefit from acute skilled OT services to address these deficits and reach maximum level of function.       Plan:     Patient to be seen 5 x/week to address the above listed problems via self-care/home management, therapeutic activities, therapeutic exercises  Plan of Care Expires: 07/25/25  Plan of Care Reviewed with: patient    Subjective     Chief Complaint: pt drowsy  Patient/Family Comments/goals: agreeable to therapy    Occupational Profile:  Lives with cousin in 1 story home with 2 steps to enter with no rail. Tub/shower with SC.   Prior to admission, patients level of function was Independent with no AD.  Equipment used at home: shower chair.  DME owned (not currently used): none.    Upon discharge, patient will have assistance from family.    Pain/Comfort:  Pain Rating 1: 6/10  Location 1: back  Pain Addressed 1: Reposition, Distraction, Cessation of Activity,  Nurse notified    Patients cultural, spiritual, Presybeterian conflicts given the current situation: no    Objective:     Communicated with: nsg prior to session.  Patient found HOB elevated with blood pressure cuff, telemetry, pulse ox (continuous) upon OT entry to room.    General Precautions: Standard, fall  Orthopedic Precautions: N/A  Braces: N/A  Respiratory Status: Room air    Occupational Performance:    Bed Mobility:    Patient completed Scooting/Bridging with maximal assistance and 2 persons  Patient completed Supine to Sit with moderate assistance and 2 persons  Patient completed Sit to Supine with moderate assistance and 2 persons    Functional Mobility/Transfers:  Patient completed Sit <> Stand Transfer with maximal assistance and of 2 persons  with  rolling walker   Functional Mobility: unable to take steps at this time    Activities of Daily Living:  Lower Body Dressing: total assistance to moy B socks    Cognitive/Visual Perceptual:  Cognitive/Psychosocial Skills:     -       Oriented to: Person, Place, and Time   -       Safety awareness/insight to disability: impaired   -       Mood/Affect/Coping skills/emotional control: Lethargic    Physical Exam:  Upper Extremity Range of Motion:     -       Right Upper Extremity: grossly WFL  -       Left Upper Extremity: appears flaccid at this time  Upper Extremity Strength:    -       Right Upper Extremity: grossly WFL   Strength:    -       Right Upper Extremity: WFL  -       Left Upper Extremity: pt noted with no active movement    AMPAC 6 Click ADL:  AMPAC Total Score: 14    Treatment & Education:  Pt would benefit from cont OT services in order to maximize functional independence.   At baseline pt is independent with no AD for mobility & ADLs.   Pt currently appears with LUE flaccid; limited testing this date 2/2 drowsiness & pt difficulty staying awake.   L facial droop noted, slurred speech.   Pt requires ModA x2 for sup<>sit, & MaxA x2 for STS w/RW.    Will progress as able.    Patient left HOB elevated with all lines intact, call button in reach, nsg notified, and sister present    GOALS:   Multidisciplinary Problems       Occupational Therapy Goals          Problem: Occupational Therapy    Goal Priority Disciplines Outcome Interventions   Occupational Therapy Goal     OT, PT/OT Progressing    Description: Goals to be met by: 2025     Patient will increase functional independence with ADLs by performing:    Toileting from bedside commode with Stand-by Assistance for hygiene and clothing management.   Sitting at edge of bed x10 minutes with Modified [place].  Rolling to Bilateral with Modified [place].   Supine to sit with Stand-by Assistance.  Step transfer with Minimal Assistance  Toilet transfer to bedside commode with Minimal Assistance.                           History:     Past Medical History:   Diagnosis Date    Anemia     Fibroid (bleeding) (uterine)     GERD (gastroesophageal reflux disease)     Hypertension          Past Surgical History:   Procedure Laterality Date     SECTION         Time Tracking:     OT Date of Treatment: 25  OT Start Time: 1145  OT Stop Time: 1201  OT Total Time (min): 16 min    Billable Minutes:Evaluation 8  Therapeutic Activity 8    2025

## 2025-06-25 NOTE — ED NOTES
Pt returned to room. Pt AAOx3, VSS, NAD noted. Denies pain, SOB, and needs at this time. Pt reconnected to monitor and purewick. Call light within reach.

## 2025-06-25 NOTE — HPI
Cadence Vasques is a 49 y.o. female w/ PMH of HTN, GERD, anemia, fibroids, tobacco use, and cocaine use who presented to [facility] ED for LSW, LS numbness, LFD, and dysarthria that began at least 4-5 days prior to her presentation. Patient initially transferred to another outside hospital and was then transferred to [facility] due to concern for worsening exam findings. NIH was reported as 12 on her initial presentation. CTA showed R distal M1 occlusion. MRI showed multifocal areas of diffusion restriction throughout the R MCA distribution. Patient transferred to [facility] NCC for HLOC.

## 2025-06-25 NOTE — PT/OT/SLP EVAL
Physical Therapy Evaluation    Patient Name:  Cadence Vasques   MRN:  [id number]    Recommendations:     Discharge Recommendations: High Intensity Therapy   Discharge Equipment Recommendations: to be determined by next level of care   Barriers to discharge: limited functional endurance/independence    Assessment:     Cadence Vasques is a 49 y.o. female admitted with a medical diagnosis of <principal problem not specified>.  She presents with the following impairments/functional limitations: weakness, impaired endurance, impaired sensation, impaired self care skills, impaired functional mobility, gait instability, impaired balance, pain, decreased safety awareness, decreased lower extremity function, decreased upper extremity function, decreased ROM, abnormal tone.    PT/OT co-evaluation completed due to anticipated complexity of pt's presentation. Pt's PLOF: Independent with no AD. Pt at this time requires MOD Ax2 with bed mobility and MAX Ax2 with transfers to standing with use of RW. PT recommending high intensity therapy to assist pt in return to independent PLOF. Therapy will continue to progress pt as able.     Rehab Prognosis: Good; patient would benefit from acute skilled PT services to address these deficits and reach maximum level of function.    Recent Surgery: * No surgery found *      Plan:     During this hospitalization, patient to be seen 5 x/week to address the identified rehab impairments via gait training, therapeutic activities, therapeutic exercises, neuromuscular re-education, wheelchair management/training and progress toward the following goals:    Plan of Care Expires:  07/25/25    Subjective     Chief Complaint: weakness to L side of body  Patient/Family Comments/goals: to progress functional independence  Pain/Comfort:  Pain Rating 1: 6/10  Location 1: back  Pain Addressed 1: Reposition, Cessation of Activity, Nurse notified  Pain Rating Post-Intervention 1:  (not  rated)    Patients cultural, spiritual, Gnosticist conflicts given the current situation: no    Living Environment:  Lives with cousin in 1 story home with 2 steps to enter with no rail. Tub/shower with SC.   Prior to admission, patients level of function was Independent with no AD.  Equipment used at home: shower chair.  DME owned (not currently used): none.  Upon discharge, patient will have assistance from family.    Objective:     Communicated with Nurse prior to session.  Patient found HOB elevated with telemetry, pulse ox (continuous), PureWick, blood pressure cuff  upon PT entry to room.    General Precautions: Standard, fall  Orthopedic Precautions:N/A   Braces: N/A  Respiratory Status: Room air    Exams:  Cognitive Exam:  Patient is oriented to Person, Place, Time, and Situation  Sensation:    -       Impaired  light/touch to LLE; no sensation to light touch on LLE; intact to RLE  RLE ROM: WFL  RLE Strength: WFL  LLE ROM: limited due to weakness  LLE Strength: 0/5 hip flexion, knee flexion/extension, ankle PF/DF    Functional Mobility:  Bed Mobility:     Scooting: maximal assistance and of 2 persons  Supine to Sit: moderate assistance and of 2 persons  Sit to Supine: moderate assistance and of 2 persons  Transfers:     Sit to Stand:  maximal assistance and of 2 persons with rolling walker      AM-PAC 6 CLICK MOBILITY  Total Score:9       Treatment & Education:  MD present in room at beginning of session.   Pt educated on role of PT.   Pt very drowsy - falling in/out of sleep; requires verbal cues to remain awake.   Pt with LLE/LUE weakness and limited to no sensation to LLE.  Pt with poor balance in standing; requiring MAX A to maintain; unable to progress steps.   Pt noted to have bed pad soiled in urine- changed per therapy.   Pt educated on use of call button; pt understanding.     Patient left HOB elevated with all lines intact, call button in reach, Nurse notified, and sister present.    GOALS:    Multidisciplinary Problems       Physical Therapy Goals          Problem: Physical Therapy    Goal Priority Disciplines Outcome Interventions   Physical Therapy Goal     PT, PT/OT Progressing    Description: Goals to be met by: 25     Patient will increase functional independence with mobility by performin. Supine to sit with Contact Guard Assistance  2. Sit to supine with Contact Guard Assistance  3. Sit to stand transfer with Contact Guard Assistance with LRAD.   4. Bed to chair transfer with Minimal Assistance using LRAD.                          DME Justifications:  TBD    History:     Past Medical History:   Diagnosis Date    Anemia     Fibroid (bleeding) (uterine)     GERD (gastroesophageal reflux disease)     Hypertension        Past Surgical History:   Procedure Laterality Date     SECTION         Time Tracking:     PT Received On: 25  PT Start Time: 1144     PT Stop Time: 1200  PT Total Time (min): 16 min With OT    Billable Minutes: Evaluation 12      2025

## 2025-06-25 NOTE — PLAN OF CARE
[facility] Care Plan  POC reviewed with Cadence Marlyn Layo and family at 1800. Patient and Family verbalized understanding. Questions and concerns addressed. No acute events today. Pt progressing toward goals. Will continue to monitor. See below and flowsheets for full assessment and VS info.     - PRN Tylenol x1  - PRN Oxycodone x1  - Plan for CT @ 0400    Is this a stroke patient? yes- Stroke booklet reviewed with family and is at bedside.   Care Plan Individualization:     Neuro:  Julia Coma Scale  Best Eye Response: 4-->(E4) spontaneous  Best Motor Response: 6-->(M6) obeys commands  Best Verbal Response: 5-->(V5) oriented  Julia Coma Scale Score: 15  Pupil PERRLA: yes     24 hr Temp:  [97.7 °F (36.5 °C)-99.7 °F (37.6 °C)]     CV:   Rhythm: normal sinus rhythm  BP goals:   SBP < 220  MAP > 65    Resp:           Plan: N/A    GI/:     Diet/Nutrition Received: NPO  Last Bowel Movement: 06/25/25  Voiding Characteristics: external catheter    Intake/Output Summary (Last 24 hours) at 6/25/2025 1832  Last data filed at 6/25/2025 1802  Gross per 24 hour   Intake --   Output 175 ml   Net -175 ml          Labs/Accuchecks:  Recent Labs   Lab 06/24/25  1857   WBC 8.95   RBC 4.64   HGB 14.4   HCT 42.2         Recent Labs   Lab 06/24/25  1855      K 3.5   CO2 26      BUN 10   CREATININE 0.4*   ALKPHOS 83   ALT 11   AST 19   BILITOT 0.6      Recent Labs   Lab 06/25/25  0544   PROTIME 12.0   INR 1.1   APTT 26.1      Recent Labs   Lab 06/25/25  0544   TROPONINI 0.006       Electrolytes: No replacement orders  Accuchecks: Q6H    Gtts:      LDA/Wounds:    Vanessa Risk Assessment  Sensory Perception: 2-->very limited  Moisture: 3-->occasionally moist  Activity: 1-->bedfast  Mobility: 2-->very limited  Nutrition: 2-->probably inadequate  Friction and Shear: 2-->potential problem  Vanessa Score: 12    Is your vanessa score 12 or less?         [facility]

## 2025-06-25 NOTE — PLAN OF CARE
Pt would benefit from cont OT services in order to maximize functional independence. Recommending high intensity therapy upon d/c. At baseline pt is independent with no AD for mobility & ADLs. Pt currently appears with LUE flaccid; limited testing this date 2/2 drowsiness & pt difficulty staying awake. Pt requires ModA x2 for sup<>sit, & MaxA x2 for STS w/RW. Will progress as able.    Problem: Occupational Therapy  Goal: Occupational Therapy Goal  Description: Goals to be met by: 07/25/2025     Patient will increase functional independence with ADLs by performing:    Toileting from bedside commode with Stand-by Assistance for hygiene and clothing management.   Sitting at edge of bed x10 minutes with Modified [place].  Rolling to Bilateral with Modified [place].   Supine to sit with Stand-by Assistance.  Step transfer with Minimal Assistance  Toilet transfer to bedside commode with Minimal Assistance.    Outcome: Progressing

## 2025-06-25 NOTE — ED NOTES
Patient noted to have erythema and urticaria to left forearm.  Patient reports pruritus localized to left wrist/forearm.   Dr. Soto notified.   Will add Morphine to allergy list.

## 2025-06-25 NOTE — ED NOTES
Call and notified patient's sister, Dorothea Hernandez that EMS was here to transport patient to [facility] ED.  She states she will meet them there.

## 2025-06-25 NOTE — ASSESSMENT & PLAN NOTE
Antithrombotics for secondary stroke prevention: Antiplatelets: Aspirin: 81 mg daily  Clopidogrel: 300 mg loading dose x 1, now  Clopidogrel: 75 mg daily    Statins for secondary stroke prevention and hyperlipidemia, if present:   Statins: Atorvastatin- 40 mg daily    Aggressive risk factor modification: HTN, cocaine use     Rehab efforts: The patient has been evaluated by a stroke team provider and the therapy needs have been fully considered based off the presenting complaints and exam findings. The following therapy evaluations are needed: PT evaluate and treat, OT evaluate and treat, SLP evaluate and treat, PM&R evaluate for appropriate placement    Diagnostics ordered/pending: CT scan of head without contrast to asses brain parenchyma, CTA Head to assess vasculature , CTA Neck/Arch to assess vasculature, HgbA1C to assess blood glucose levels, Lipid Profile to assess cholesterol levels, MRI head without contrast to assess brain parenchyma, TTE to assess cardiac function/status     VTE prophylaxis: Enoxaparin 40 mg SQ every 24 hours    BP parameters: Infarct: No intervention, SBP <220    - MRI brain. Permissive HTN w/ BP<220/120 for the next 48-72 hrs.  --aspirin 325 and Plavix 300 load on admission  -- HOB flat   -- Lipitor 40mg daily.   --ASA 81mg + Plavix 75mg x21 day, followed by monotherapy with ASA 81mg thereafter.   -- Check Lipid panel,   --A1c,   --Utox--positive cocaine  -- Target LDL<70, A1c<7   -- TTE w/ bubble   -- PT/OT/SLP  --neurology consult

## 2025-06-25 NOTE — ASSESSMENT & PLAN NOTE
Cadence Vasques is a 49 y.o. female w/ PMH of HTN, GERD, anemia, fibroids, tobacco use, and cocaine use who presented to [facility] ED for LSW, LS numbness, LFD, and speech difficulty that began at least 4-5 days prior to her presentation. Patient initially transferred to an OSH and was then transferred to [facility] due to concern for worsening exam findings. NIH was reported as 12 on her initial presentation. CTA showed R distal M1 occlusion. MRI showed multifocal areas of diffusion restriction throughout the R MCA distribution. UDS + for cocaine. .4. A1c 5.5.  Patient transferred to [facility] NCC for HLOC.     Exam c/w R MCA syndrome. Patient OOW for any intervention. Will closely monitor with q1h neuro checks and avoid hypoperfusion.    Admit to NCC  NSGY consulted given patient's age and size of infarct; no acute intervention at this time per their recommendations  Hourly neuro checks, vital signs, I/Os  CBC, CMP, mag, and phos daily  SBP goal < 220  PRN labetalol and hydralazine  DAPT  Statin   Full liquid diet per SLP recs  Vascular Neurology consulted  SCD; lovenox  PT/OT/SLP

## 2025-06-25 NOTE — ED NOTES
Report received from DONTAE Gore.  Pt to room 14.  Pt repositioned for comfort.  Pt placed on cardiac monitor.  Pt denies any further request.

## 2025-06-25 NOTE — PT/OT/SLP EVAL
Speech Language Pathology Evaluation  Bedside Swallow    Patient Name:  Cadence Vasques   MRN:  [id number]  Admitting Diagnosis: CVA (cerebral vascular accident)    Recommendations:                 General Recommendations:  Dysphagia therapy and Cognitive-linguistic evaluation  Diet recommendations:  NPO, Full liquids   Aspiration Precautions: 1 bite/sip at a time, Alternating bites/sips, Assistance with meals, Feed only when awake/alert, HOB to 90 degrees, Meds whole 1 at a time, Remain upright 30 minutes post meal, Small bites/sips, and Standard aspiration precautions   General Precautions: Standard, aspiration, fall  Communication strategies:  provide increased time to answer  Discharge recommendations:  High Intensity Therapy   Barriers to Discharge:  None    Assessment:     Cadence Vasques is a 49 y.o. female admitted via EMS with stroke symptoms. EMS reports LKW 2200 last night. Pt woke up at 1500 today with left sided weakness, left facial droop, and slurred speech. Pt is safe to initiate conservative FULL LIQUID diet at this time following standard swallow precautions.     History per MD:   HPI: 50 y/o female with a history of HTN, fibroids who presents with left sided weakness, paresthesias, facial droop and slurred speech initially thought to have last known well at 10:00 p.m. last night however family bedside reporting patient has been stumbling and having slurred words over the last 3-4 days.  At [facility] patient ruled out for any lytic therapy.  Started on aspirin and Plavix load.  CTA reviewed by neurovascular R distal M1 occlusion noted.  Urine tox positive for cocaine.  Patient transferred to [facility] for MRI and stroke workup.  NIHSS of 12 on admission which remains after transfer.  Admit to hospital medicine neurology consulted.    Past Medical History:   Diagnosis Date    Anemia     Fibroid (bleeding) (uterine)     GERD (gastroesophageal reflux disease)     Hypertension   "      Past Surgical History:   Procedure Laterality Date     SECTION         Social History: Patient lives with cousin.    Prior Intubation HX:  none    Modified Barium Swallow: none on file    Chest X-Rays: none on file     MRI Brain: 25:    1. Motion compromised, prematurely terminated study.  2. Corresponding to findings on prior CT/CTA imaging of 2025, multifocal areas of expansile restricted diffusion T2 weighted signal abnormality are noted throughout the right MCA territory in keeping with acute to early subacute infarct.  No definite macroscopic hemorrhage or significant mass effect at the present time.  3. Diminutive flow related signal of right MCA branches within the sylvian fissure, in keeping with distal M1 occlusion demonstrated on prior CTA performed 2025.  Serpiginous FLAIR hyperintense signal in this region in keeping with slow and/or disorganized flow due to the proximal occlusion.    Prior diet: regular.    Subjective   Pt asleep upon SLP entry easily woke to call of name. Pt able to fluctuating PATITO throughout session. Pt agreeable to ST eval this date. Pt's sister present for ST eval.     Patient goals: "I want crawfish bisque"     Pain/Comfort:  Pain Rating 1: 0/10    Respiratory Status: Room air    Objective:   Cognition/communication: Fluctuating alertness throughout session, requiring verbal cues throughout session, and demonstrated slightly prolonged processing and initiation throughout session. Oriented to name, , current place, current year and reasoning for current admission. Able to follow all oral motor commands and all informal commands to sit upright. Pt with moderate dysarthria with speech intelligibility with ~60%. Pt with short responses to SLP questions and was rambling off various food items she wanted to eat - "donuts, crawfish bisque, cheese sandwich" - intermittently between SLP questions. Pt demonstrated poor turn-taking and poor topic maintenance " throughout session.     Oral Musculature Evaluation  Oral Musculature: general weakness  Dentition: scattered dentition  Secretion Management: adequate  Mucosal Quality: adequate  Mandibular Strength and Mobility: flaccid  Oral Labial Strength and Mobility: impaired pursing, impaired retraction  Lingual Strength and Mobility: impaired protrusion, impaired anterior elevation, impaired depression, impaired right lateral movement, impaired left lateral movement  Velar Elevation: WFL  Buccal Strength and Mobility: flaccid  Volitional Cough:  (strong)  Volitional Swallow:  (elicited)  Voice Prior to PO Intake:  (mod dysarthria noted)    Bedside Swallow Eval:   Consistencies Assessed:  Thin liquids via straw x5 and via cup x2  Puree tsp of puree x5  Solids jermaine cracker x2     Oral Phase:   Excess chewing  Prolonged mastication  Slow oral transit time    Pharyngeal Phase:   coughing/choking x1 following straw sip  delayed swallow initation    Compensatory Strategies  None    Treatment: SLP educated Pt and her sister on role of SLP, diet recs, reason for eval, effects of stroke on swallowing, and purpose for administering various consistencies. Pt and sister verbalized understanding. Pt may require reinforcement. Pt agreeable and participated in swallow eval this date. Pt is safe to initiate FULL LIQUID diet following standards swallow precautions:   -meds- whole 1 at a time  -1:1 assist with all po intake  -SMALL bites/sips  -alternate bites/sips  -1 bite/sip at a time  -HOB to 90 degrees during all po intake  -remain upright for 30 min s/p meals  Pt would benefit from further cognitive-linguistic evaluation to determine specific deficits.    Goals:   Multidisciplinary Problems       SLP Goals          Problem: SLP    Goal Priority Disciplines Outcome   SLP Goal     SLP Ongoing   Description: Short Term Goals:  1. Pt will participate in a clinical swallow eval to determine least restrictive diet. - Ongoing  2. Pt will  safely tolerate >75% of FULL LIQUID diet with no overt s/s of aspiration. - Ongoing  3. Pt will participate in informal speech-language and cognitive eval to r/o related deficits. - Ongoing                         Plan:     Patient to be seen:  3 x/week, 4 x/week   Plan of Care expires:  07/25/25  Plan of Care reviewed with:  patient (sister)   SLP Follow-Up:  Yes       Time Tracking:     SLP Treatment Date:   06/25/25  Speech Start Time:  1024  Speech Stop Time:  1051     Speech Total Time (min):  27 min    Billable Minutes: Eval Swallow and Oral Function 17 and Self Care/Home Management Training 10    06/25/2025

## 2025-06-25 NOTE — ASSESSMENT & PLAN NOTE
Antithrombotics for secondary stroke prevention: Antiplatelets: Aspirin: 81 mg daily  Clopidogrel: 300 mg loading dose x 1, now  Clopidogrel: 75 mg daily    Statins for secondary stroke prevention and hyperlipidemia, if present:   Statins: Atorvastatin- 40 mg daily    Aggressive risk factor modification: HTN, cocaine use     Rehab efforts: The patient has been evaluated by a stroke team provider and the therapy needs have been fully considered based off the presenting complaints and exam findings. The following therapy evaluations are needed: PT evaluate and treat, OT evaluate and treat, SLP evaluate and treat, PM&R evaluate for appropriate placement    Diagnostics ordered/pending: CT scan of head without contrast to asses brain parenchyma, CTA Head to assess vasculature , CTA Neck/Arch to assess vasculature, HgbA1C to assess blood glucose levels, Lipid Profile to assess cholesterol levels, MRI head without contrast to assess brain parenchyma, TTE to assess cardiac function/status     VTE prophylaxis: Enoxaparin 40 mg SQ every 24 hours    BP parameters: Infarct: No intervention, SBP <220    - MRI brain.  Noted, Permissive HTN w/ BP<220/120 for the next 48-72 hrs.  --aspirin 325 and Plavix 300 load on admission  -- HOB flat   -- Lipitor 40mg daily.   --ASA 81mg + Plavix 75mg x21 day, followed by monotherapy with ASA 81mg thereafter.   -- Check Lipid panel,   --A1c,   --Utox--positive cocaine  -- Target LDL<70, A1c<7   -- TTE w/ bubble   -- PT/OT/SLP  --neurology consulted and recommended transfer to [facility] for neuro critical Care

## 2025-06-25 NOTE — ED PROVIDER NOTES
ED Provider Note - 2025    History     Chief Complaint   Patient presents with    Cerebrovascular Accident     Pt arrived via EMS with stroke symptoms. EMS reports LKW 2200 last night. Pt woke up at 1500 today with left sided weakness, left facial droop, and slurred speech.      Patient presents via EMS with concern regarding stroke.  Patient reportedly awoke at 3:00 p.m. this evening and was unable to move her left side.  Patient also noted to have left-sided facial droop and significant difficulty talking.  The patient was last known well around 2200 last p.m. as family members reports she has been sleeping since that time.  Patient's cousins who are present at bedside also indicate that the patient was having difficulty walking and noted a facial droop as early as Thursday evening of this past week.  The encouraged the patient to present to the emergency department at that time but she declined.  Patient presently denies headache.  No existing history of CVA.  Patient does have a history of crack cocaine abuse and endorses use last evening.      Review of patient's allergies indicates:   Allergen Reactions    Opioids - morphine analogues Hives and Itching     Reaction noted after re-assessment of getting Morphine.      Past Medical History:   Diagnosis Date    Anemia     Fibroid (bleeding) (uterine)     GERD (gastroesophageal reflux disease)     Hypertension      Past Surgical History:   Procedure Laterality Date     SECTION       No family history on file.  Social History[1]     Review of Systems   Constitutional:  Negative for chills and fever.   Eyes:  Negative for visual disturbance.   Respiratory:  Negative for cough and shortness of breath.    Cardiovascular:  Negative for chest pain.   Gastrointestinal:  Negative for abdominal pain, diarrhea and vomiting.   Neurological:  Positive for weakness and numbness. Negative for dizziness.     The patient's list of active medical problems, social  history, medications, and allergies as documented per RN staff has been reviewed.     Physical Exam     Vitals:    06/24/25 1933 06/24/25 1948 06/24/25 2004 06/24/25 2058   BP: (!) 157/106 (!) 166/102 (!) 181/94    Pulse: 92 90 78    Resp: 20 20 (!) 21 16   Temp: 99.1 °F (37.3 °C)  97.7 °F (36.5 °C)    TempSrc: Oral  Oral    SpO2: 100% 96% 99%    Weight:       Height:        06/24/25 2102 06/24/25 2147 06/24/25 2247 06/24/25 2317   BP: (!) 142/81 133/78 (!) 161/88 (!) 142/87   Pulse: 68 72 70 69   Resp: 15 16 15 15   Temp:  99.7 °F (37.6 °C) 99.1 °F (37.3 °C)    TempSrc:  Oral Oral    SpO2: 100% 99% 99% 100%   Weight:       Height:        06/25/25 0002 06/25/25 0018 06/25/25 0158 06/25/25 0212   BP: (!) 150/91 (!) 170/113 (!) 143/81 135/69   Pulse: 74 84 73 67   Resp: 16 (!) 21 19 17   Temp:       TempSrc:       SpO2: 98% 100% 98% 97%   Weight:       Height:        06/25/25 0302 06/25/25 0402 06/25/25 0507   BP: 112/68 109/89 126/71   Pulse: 70 79 68   Resp: 16 18 18   Temp:      TempSrc:      SpO2: 98% 98% 99%   Weight:      Height:        Physical Exam    Nursing note and vitals reviewed.  Constitutional: She appears well-developed and well-nourished. She is not diaphoretic. No distress.   HENT:   Head: Normocephalic and atraumatic.   Nose: Nose normal. Mouth/Throat: Oropharynx is clear and moist.   Eyes: Conjunctivae are normal. No scleral icterus.   Cardiovascular:  Normal rate, regular rhythm and intact distal pulses.           Pulmonary/Chest: No respiratory distress.   Musculoskeletal:         General: No edema. Normal range of motion.     Neurological: She is alert and oriented to person, place, and time. GCS eye subscore is 4. GCS verbal subscore is 5. GCS motor subscore is 6.   NIH Stroke Scale: 13    1a  Level of consciousness: 0=alert; keenly responsive  1b. LOC questions:  0=Answers both tasks correctly  1c. LOC commands: 0=Answers both tasks correctly  2.  Best Gaze: 0=normal  3.  Visual: 0=No visual  loss  4. Facial Palsy: 3=Complete paralysis of one or both sides (absence of facial movement in the upper and lower face)  5a.  Motor left arm: 3=No effort against gravity, limb falls  5b.  Motor right arm: 0=No drift, limb holds 90 (or 45) degrees for full 10 seconds  6a. motor left leg: 3=No effort against gravity, limb falls  6b  Motor right le=No drift, limb holds 90 (or 45) degrees for full 10 seconds  7. Limb Ataxia: 1=Present in one limb  8.  Sensory: 2=Severe to total sensory loss; patient is not aware of being touched in face, arm, leg  9. Best Language:  0=No aphasia, normal  10. Dysarthria: 1=Mild to moderate, patient slurs at least some words and at worst, can be understood with some difficulty  11. Extinction and Inattention: 0=No abnormality       Skin: Skin is warm and dry.       ED Procedures   Procedures    MDM  Differential Diagnoses   Based on available history, the working differential diagnoses considered during this evaluation include but are not limited to hemorrhagic CVA, embolic/thrombotic CVA, TIA.      LABS     Labs Reviewed   COMPREHENSIVE METABOLIC PANEL - Abnormal       Result Value    Sodium 139      Potassium 3.5      Chloride 104      CO2 26      Glucose 102      BUN 10      Creatinine 0.4 (*)     Calcium 9.2      Protein Total 8.1      Albumin 4.3      Bilirubin Total 0.6      ALP 83      AST 19      ALT 11      Anion Gap 9      eGFR >60     TSH - Abnormal    TSH 0.347 (*)    DRUG SCREEN PANEL, URINE EMERGENCY - Abnormal    Benzodiazepine, Urine Negative      Methadone, Urine Negative      Cocaine, Urine Presumptive Positive (*)     Opiates, Urine Negative      Barbiturates, Urine Negative      Amphetamines, Urine Negative      THC Negative      Phencyclidine, Urine Negative      Urine Creatinine 66.4      Narrative:     This screen includes the following classes of drugs at the   listed cut-off:      Amph =999 ng/ml  Kendy  =199 ng/ml  Vipul =199 ng/ml  THC =49.9 ng/ml  COCM  =299 ng/ml  METD =299 ng/ml  OP =299 ng/ml  PCP = 24.9 ng/ml    High concentrations of Diphenhydramine may cross-react with  Phencyclidine PCP screening immunoassay giving a false   positive result.    High concentrations of Methylenedioxymethamphetamine (MDMA aka  Ectasy) and other structurally similar compounds may cross-   react with the Amphetamine/Methamphetamine screening   immunoassay giving a false positive result.    A metabolite of the anti-HIV drug Sustiva () may cause  false positive results in the Marijuana metabolite (THC)   screening assay.    Note: This exception list includes only more common   interferants in toxicology screen testing.  Because of many   cross-reactants, positive results on toxicology drug screens   should be confirmed whenever results do not correlate with   clinical presentation.    This report is intended for use in clinical monitoring and  management of patients. It is not intended for use in   employment related drug testing.    Presumptive positive results are unconfirmed and may be used   only for medical purposes.    Assay Intended Use: This assay provides only a preliminary analytical  test result. A more specific alternate chemical method must be used  to obtain a confirmed analytical result. Gas chromatography/mass  spectrometry (GS/MS)is the preferred confirmatory method. Clinical  consideration and professional judgement should be applied to any   drug of abuse test result, particularly when preliminary results  are used.   PROTIME-INR - Normal    PT 11.8      INR 1.1     CBC WITH DIFFERENTIAL - Normal    WBC 8.95      RBC 4.64      HGB 14.4      HCT 42.2      MCV 91      MCH 31.0      MCHC 34.1      RDW 13.0      Platelet Count 303      MPV 10.3      Nucleated RBC 0      Neut % 72.6      Lymph % 21.1      Mono % 5.5      Eos % 0.0      Basophil % 0.6      Imm Grans % 0.2      Neut # 6.50      Lymph # 1.89      Mono # 0.49      Eos # 0.00      Baso # 0.05       [facility] Grans # 0.02     CBC W/ AUTO DIFFERENTIAL    Narrative:     The following orders were created for panel order CBC W/ AUTO DIFFERENTIAL.  Procedure                               Abnormality         Status                     ---------                               -----------         ------                     CBC with Differential[[id number]]       Normal              Final result                 Please view results for these tests on the individual orders.   LIPID PANEL   T4, FREE   HEMOGLOBIN A1C   TROPONIN I   APTT   PROTIME-INR   POCT URINE PREGNANCY    POC Preg Test, Ur Negative       Acceptable Yes             Notable findings from my independent interpretations of the labs (if ordered) include:  CMP unremarkable.  Toxicology screen notable for cocaine.  Coagulation studies unremarkable.  CBC unremarkable.     Imaging     Imaging Results               MRI Brain Without Contrast (Final result)  Result time 06/25/25 09:52:01   Notes recorded by Marlena Doe NP on 6/25/2025 at 3:27 PM CDT  Admitted and being followed.      Final result by oPrtillo Garza MD (06/25/25 09:52:01)                   Impression:      1. Motion compromised, prematurely terminated study.  2. Corresponding to findings on prior CT/CTA imaging of 06/24/2025, multifocal areas of expansile restricted diffusion T2 weighted signal abnormality are noted throughout the right MCA territory in keeping with acute to early subacute infarct.  No definite macroscopic hemorrhage or significant mass effect at the present time.  3. Diminutive flow related signal of right MCA branches within the sylvian fissure, in keeping with distal M1 occlusion demonstrated on prior CTA performed 06/24/2025.  Serpiginous FLAIR hyperintense signal in this region in keeping with slow and/or disorganized flow due to the proximal occlusion.  This report was flagged in Epic as abnormal.      Electronically signed by: Portillo  Kayla  Date:    06/25/2025  Time:    09:52               Narrative:    EXAMINATION:  MRI BRAIN WITHOUT CONTRAST    CLINICAL HISTORY:  Stroke, follow up;    TECHNIQUE:  Multiplanar multisequence MR imaging of the brain was performed without contrast.  Note that examination was prematurely terminated due to patient noncompliance.  No heme sensitive sequence obtained.    COMPARISON:  CT head without contrast and CTA of the head and neck performed 06/24/2025.    FINDINGS:  Comment: Motion compromised examination.    Parenchyma: Corresponding to findings on prior CT/CTA imaging of 06/24/2025, multifocal areas of expansile restricted diffusion T2 weighted signal abnormality are noted throughout the right MCA territory in keeping with acute to early subacute infarct.  Relatively modest degree of mass effect related to the cytotoxic edema, with mild partial effacement of the right lateral ventricle.  No significant midline shift at the present time.  No definite macroscopic hemorrhage within the infarct territory, at noting lack of heme sensitive sequences.    No definite additional areas of recent ischemia and other vascular territories the present time.  Suspect remote lacunar type infarct in the left internal capsule.  Few additional areas of nonspecific T2/FLAIR hyperintense signal in the white matter may reflect sequela of chronic small vessel ischemic disease.    Additional comments: There is no midline shift, abnormal extra-axial fluid collection, or acute intracranial hemorrhage. The basal cisterns are patent.    Ventricles: Normal.    Flow voids: Diminutive flow related signal of right MCA branches within the sylvian fissure, in keeping with distal M1 occlusion demonstrated on prior CTA performed 06/24/2025.  Serpiginous FLAIR hyperintense signal in this region in keeping with slow and/or disorganized flow due to the proximal occlusion.  Major intracranial vascular flow voids elsewhere appear grossly  maintained.    Sinuses and mastoid air cells: Essentially clear    Orbits: Normal    Midline structures: The pituitary and craniocervical junction are normal.    Marrow: Normal                                         CTA Head and Neck (xpd) (Final result)  Result time 06/24/25 20:45:59      Final result by Jaime Wallace DO (06/24/25 20:45:59)                   Impression:      1. No acute intracranial abnormality.  2. No large vessel occlusion.  3.  Partially imaged 3.8 cm circumscribed soft tissue density/ mass extending from the superior mediastinum to the right suprahilar region of unclear etiology.    % stenosis derived by comparing the narrowest segment with the distal luminal diameter as related to the reported measure of arterial narrowing.      All CT scans at [this location] are performed using dose modulation techniques as appropriate to a performed exam including the following: automated exposure control; adjustment of the mA and/or kV according to patient size (this includes techniques or standardized protocols for targeted exams where dose is matched to indication / reason for exam; i.e. extremities or head); use of iterative reconstruction technique.        Finalized on: 6/24/2025 8:45 PM By:  Jaime Wallace  [facility]# [id number]      2025-06-24 20:48:10.081     [facility]               Narrative:    Exam: CTA HEAD AND NECK (XPD), CT HEAD WITHOUT CONTRAST    Comparison:  None    Clinical Indication:  Possible stroke    TECHNIQUE: Axial noncontrast images are acquired from the foramen magnum through the vertex.  After the uneventful administration of IV contrast, CT angiographic images are acquired from the aortic arch through the vertex. Sagittal, coronal and MIP images were also provided.    FINDINGS:    Noncontrast head CT: No intra-extra-axial hemorrhage.  No shift of the midline structures.    No hyperdense vessels.  Multifocal areas of encephalomalacia within the right frontal and parietal lobes.   Gray-white differentiation is otherwise preserved throughout both cerebral hemispheres.    Paranasal sinuses and mastoid air cells are clear.    CTA head and neck: Anatomy:  Neck and cervical vessels demonstrate normal vascular anatomy.    Right carotid:  No aneurysms, dissections, thromboses or hemodynamically significant stenoses.    Left carotid:  No aneurysm, dissection, thromboses or hemodynamically significant stenosis.    Right and left vertebral arteries:Patent, without aneurysm or dissection.    Posterior circulation:  PICA:Patent without significant stenosis.  Distal vertebral arteries:Patent without significant stenosis  Basilar artery:Patent without significant stenosis or aneurysm.  Posterior cerebral arteries:Patent without significant stenosis.  Posterior communicating arteries:Not clearly visualized.    Anterior circulation:     Internal carotid arteries:  Patent without significant stenosis.     Middle cerebral arteries:  Patent without significant stenosis.     A1 segments:  Patent without significant stenosis.    Anterior cerebral arteries:  Patent without significant stenosis.    Anterior communicating artery:  Not clearly visualized.    NECK:  Visualized soft tissues of the neck are unremarkable. No lymphadenopathy.    Lungs/mediastinum: Partially imaged 3.0 x 3.4 x 3.8 cm circumscribed soft tissue density/mass extending from the superior mediastinum to the right suprahilar region.  Lungs are clear..    Osseous structures:  No concerning lytic or sclerotic bony lesions.                                          CT Head Without Contrast (Final result)  Result time 06/24/25 19:25:24      Final result by Sylvester Lance MD (06/24/25 19:25:24)                   Impression:      Findings concerning for multifocal recent infarcts throughout the right cerebral hemisphere, likely acute/subacute.  No parenchymal or extra-axial hemorrhage.  Further evaluation with MRI brain and/or CTA head and neck can be  obtained as warranted.    This report was flagged in Epic as abnormal.    COMMUNICATION  This critical result was discovered/received at 19:13 hours.  The critical information above was relayed directly by me by telephone to Dr. Trevin Soto in the emergency department on 06/24/2025 at 19:18 hours.      Electronically signed by: Sylvester Lance MD  Date:    06/24/2025  Time:    19:25               Narrative:    EXAMINATION:  CT HEAD WITHOUT CONTRAST    CLINICAL HISTORY:  Neuro deficit, acute, stroke suspected;    TECHNIQUE:  Low dose axial CT images obtained throughout the head without intravenous contrast. Sagittal and coronal reconstructions were performed.    COMPARISON:  None.    FINDINGS:  Intracranial compartment:    Brain appears normally formed.  Multifocal hypoattenuating parenchymal foci without significant volume loss throughout the right cerebral hemisphere some of which extend to the cortex with poor gray-white differentiation while others involve centrum semiovale, corona radiata and deep gray nuclei on the right concerning for recent infarcts, likely acute/subacute.  Ventricles are midline.  There is mild effacement of the frontal horn right lateral ventricle, may be secondary to mass effect from adjacent parenchymal edema.  No evidence of acute hydrocephalus or layering intraventricular hemorrhage.  No significant midline shift.    Right choroid plexus at the posterior horn is asymmetrically larger and more hyperattenuating than the left favored to represent asymmetric choroid plexus calcification.  No extra-axial blood or fluid collections.    No parenchymal mass or hemorrhage.  No major vascular distribution infarct seen on the left.    Skull/extracranial contents (limited evaluation): No fracture. Mastoid air cells and paranasal sinuses are essentially clear.  Imaged portions of the orbits are within normal limits.                                       X-Rays:   Independently Interpreted Readings:    Head CT: No hemorrhage.  No skull fracture. Multifocal areas of subacute infarction in the right hemisphere                EKG     EKG Readings: (Independently Interpreted)   Initial Reading: No STEMI. Rhythm: Normal Sinus Rhythm. Heart Rate: 74. Ectopy: PVCs Rare. Conduction: Normal. Axis: Normal.       ED Management/Discussion     Medications   sodium chloride 0.9% flush 10 mL (has no administration in time range)   sodium chloride 0.9% bolus 500 mL 500 mL (has no administration in time range)   bisacodyL suppository 10 mg (has no administration in time range)   aspirin EC tablet 81 mg (has no administration in time range)   clopidogreL tablet 75 mg (has no administration in time range)   atorvastatin tablet 40 mg (has no administration in time range)   ondansetron injection 4 mg (has no administration in time range)   hydrALAZINE injection 10 mg (has no administration in time range)   acetaminophen 1,000 mg/100 mL (10 mg/mL) injection 1,000 mg (has no administration in time range)   iohexoL (OMNIPAQUE 350) injection 75 mL (75 mLs Intravenous Given 6/24/25 1948)   morphine injection 2 mg (2 mg Intravenous Given 6/24/25 2058)   aspirin chewable tablet 324 mg (324 mg Oral Given 6/24/25 2045)   clopidogreL tablet 300 mg (300 mg Oral Given 6/24/25 2045)   diphenhydrAMINE injection 50 mg (50 mg Intravenous Given 6/24/25 2159)                          Critical details of the encounter were discussed with the telemedicine neurologist.  Although emergent MRI was considered to evaluate for possible treatment options of an apparent wake-up stroke, given the patient's delay in presentation, she would not be within the window for thrombolytic therapy.  Similarly, given the onset of symptoms over the past few days in the probability of infarction development as early as this past Thursday, she would also not be a candidate for neurovascular intervention of an LVO.    All available findings were reviewed with the  patient/family in detail along with the indications for hospitalization in order to receive stroke workup with consideration for echocardiography, carotid studies, and MRI along with neurology consultation in initiation therapy.  All remaining questions and concerns were addressed at this time and the patient/family communicates understanding and agrees to proceed accordingly.  Similarly, all pertinent details of the encounter were discussed with LISSETTE Vaca who agrees to receive the patient at [facility] on behalf of Dr. Ocasio for further care as outlined above.  The patient will be transferred by EMS secondary to a need for neuro and ongoing cardiac monitoring en route.    Critical Care Time:  A total of 60 minutes was spent  examining the patient, acquiring history, ordering/interpreting laboratory studies, ordering/interpreting radiographic studies, ordering/performing treatments and interventions, evaluating response to treatment, discussing with consultants, developing a treatment plan with the patient/surrogate, establishing arrangements for hospitalization   Critical care time was exclusive of separately billable procedures and treating other patients.      CLINICAL IMPRESSION    ICD-10-CM ICD-9-CM   1. Acute focal neurological deficit  R29.818 781.99   2. CVA (cerebral vascular accident)  I63.9 434.91        ED Disposition Condition    Admit             Social Drivers of Health     Tobacco Use: High Risk (6/24/2025)    Patient History     Smoking Tobacco Use: Every Day     Smokeless Tobacco Use: Unknown     Passive Exposure: Not on file   Alcohol Use: Not on file   Financial Resource Strain: Not on file   Food Insecurity: Not on file   Transportation Needs: Not on file   Physical Activity: Not on file   Stress: Not on file   Housing Stability: Not on file   Depression: Not on file   Utilities: Not on file   Health Literacy: Not on file   Social Isolation: Not on file             [1]   Social  "History  Tobacco Use    Smoking status: Every Day     Current packs/day: 0.50     Types: Cigarettes   Vaping Use    Vaping status: Every Day   Substance Use Topics    Alcohol use: Yes     Comment: drinks beer once per week    Drug use: Yes     Types: "Crack" cocaine        Trevin Soto MD  06/25/25 1929    "

## 2025-06-25 NOTE — ASSESSMENT & PLAN NOTE
Urine drug screen is positive for cocaine use  When patient is more medically stable, she will benefit from counseling

## 2025-06-25 NOTE — CONSULTS
"[facility] - Neuro Critical Care  Neurosurgery  Consult Note    Inpatient consult to Neurosurgery  Consult performed by: Miladys Méndez PA-C  Consult ordered by: Steven Contreras MD        Subjective:     Chief Complaint/Reason for Admission: CVA    History of Present Illness: Cadence Vasques is a 49 y.o. female transferred to [facility] for R M1 occlusion, presenting with L sided plegia, L FD, Right gaze preference and dysarthria. Family at bedside reports symptoms began Thursday of last week. They noticed the patient was slurring her words and stumbling. Patient is currently on aspirin/plavix. Urine tox positive for cocaine.     Prescriptions Prior to Admission[1]    Review of patient's allergies indicates:   Allergen Reactions    Opioids - morphine analogues Hives and Itching     Reaction noted after re-assessment of getting Morphine.        Past Medical History:   Diagnosis Date    Anemia     Fibroid (bleeding) (uterine)     GERD (gastroesophageal reflux disease)     Hypertension      Past Surgical History:   Procedure Laterality Date     SECTION       Family History    None       Tobacco Use    Smoking status: Every Day     Current packs/day: 0.50     Types: Cigarettes    Smokeless tobacco: Not on file   Vaping Use    Vaping status: Every Day   Substance and Sexual Activity    Alcohol use: Yes     Comment: drinks beer once per week    Drug use: Yes     Types: "Crack" cocaine    Sexual activity: Not on file     Review of Systems   Unable to perform ROS: Acuity of condition     Objective:     Weight: 60.5 kg (133 lb 6.1 oz)  Body mass index is 22.89 kg/m².  Vital Signs (Most Recent):  Temp: 98.6 °F (37 °C) (25 1502)  Pulse: 62 (25 1502)  Resp: 18 (25 1502)  BP: 139/80 (25 1502)  SpO2: 98 % (25 1502) Vital Signs (24h Range):  Temp:  [97.7 °F (36.5 °C)-99.7 °F (37.6 °C)] 98.6 °F (37 °C)  Pulse:  [56-92] 62  Resp:  [10-21] 18  SpO2:  [93 %-100 %] 98 %  BP: " (109-181)/() 139/80             Neurosurgery Physical Exam  General: Well developed, well nourished.  Head: Normocephalic.  GCS: Motor: 6/Verbal: 5/Eyes: 4 GCS Total: 15  Eyes: Pupils equal, round, reactive to light with accomodation, EOMI. Right gaze preference.  CN: Left sided facial droop.  Motor Strength: Moves right side full strength and to command. Left sided plegia.   Language/speech: Dysarthria.  Pulses: 2+ and symmetric radial and dorsalis pedis.  Skin: Skin is warm, dry and intact.    Significant Labs:  Recent Labs   Lab 06/24/25  1855         K 3.5      CO2 26   BUN 10   CREATININE 0.4*   CALCIUM 9.2     Recent Labs   Lab 06/24/25  1857   WBC 8.95   HGB 14.4   HCT 42.2        Recent Labs   Lab 06/24/25  1855 06/25/25  0544   INR 1.1 1.1   APTT  --  26.1     Microbiology Results (last 7 days)       ** No results found for the last 168 hours. **          All pertinent labs from the last 24 hours have been reviewed.    Significant Diagnostics:  I have reviewed and interpreted all pertinent imaging results/findings within the past 24 hours.    Assessment/Plan:     * Cerebrovascular accident (CVA) due to occlusion of right middle cerebral artery  Cadence Vasques is a 49 y.o. female transferred to [facility] for R M1 occlusion, presenting with L sided plegia, L FD, Right gaze preference and dysarthria. Family at bedside reports symptoms began Thursday of last week. They noticed the patient was slurring her words and stumbling. Patient is currently on aspirin/plavix. Urine tox positive for cocaine.     CTH 6/24: No midline shift. There is mild effacement of the right lateral ventricle frontal horn.  CTA 6/24: No LVO.  MRI Brain 6/25: R MCA infarct.    PSD 6?    - Admitted to [facility].  - q1h neurochecks.  - No acute neurosurgical intervention.   - F/u repeat CTH tomorrow AM.  - Medical management per [facility].    D/w Dr. Haywood.            Thank you for your consult. I will follow-up  with patient. Please contact us if you have any additional questions.    Miladys Méndez PA-C  Neurosurgery  [facility] - Neuro Critical Care         [1]   Medications Prior to Admission   Medication Sig Dispense Refill Last Dose/Taking    [START ON 6/26/2025] aspirin (ECOTRIN) 81 MG EC tablet Take 1 tablet (81 mg total) by mouth once daily. 360 tablet 0     [START ON 6/26/2025] atorvastatin (LIPITOR) 40 MG tablet Take 1 tablet (40 mg total) by mouth once daily. 90 tablet 3     [START ON 6/26/2025] clopidogreL (PLAVIX) 75 mg tablet Take 1 tablet (75 mg total) by mouth once daily. for 21 days 21 tablet 0     FERROUS FUMARATE (IRON ORAL) Take by mouth.       omeprazole (PRILOSEC) 20 MG capsule Take 20 mg by mouth once daily.

## 2025-06-25 NOTE — CONSULTS
Food & Nutrition  Education    Diet Education: Cardiac Education  Time Spent: RD remote  Learners: Patient       Nutrition Education provided with handouts:   + Clinical Reference attachments to d/c documents    Nutrition Related Social Determinants of Health: SDOH: Unable to assess at this time.     Comments:  Patient is NPO and remains in the ED at this time. Patient admitted with CVA diagnosis.  Labs reviewed.  NKFA.  LBM:WAYLON   I/O since admit +98mL.  Education handouts attached to discharge paperwork. Diet educations to be provided at follow up visit after NPO status advances.  SLP evaluation pending.      Patient Active Problem List   Diagnosis    Tobacco dependency    CVA (cerebral vascular accident)     Past Medical History:   Diagnosis Date    Anemia     Fibroid (bleeding) (uterine)     GERD (gastroesophageal reflux disease)     Hypertension      BMP  Lab Results   Component Value Date     06/24/2025    K 3.5 06/24/2025     06/24/2025    CO2 26 06/24/2025    BUN 10 06/24/2025    CREATININE 0.4 (L) 06/24/2025    CALCIUM 9.2 06/24/2025    ANIONGAP 9 06/24/2025    EGFRNORACEVR >60 06/24/2025     Lab Results   Component Value Date    HGBA1C 5.5 06/25/2025     Lab Results   Component Value Date    CALCIUM 9.2 06/24/2025       Scheduled Meds:   aspirin  81 mg Oral Daily    atorvastatin  40 mg Oral Daily    clopidogreL  75 mg Oral Daily    enoxparin  40 mg Subcutaneous Q24H (prophylaxis, 1700)     Continuous Infusions:  PRN Meds:.  Current Facility-Administered Medications:     bisacodyL, 10 mg, Rectal, Daily PRN    hydrALAZINE, 10 mg, Intravenous, Q6H PRN    ondansetron, 4 mg, Intravenous, Q12H PRN    sodium chloride 0.9%, 500 mL, Intravenous, PRN    sodium chloride 0.9%, 10 mL, Intravenous, PRN       All questions and concerns answered. Dietitian's contact information provided.       Follow-Up: Yes     Please Re-consult as needed        Thanks  Emmie Whiteside, MS, RDN, LDN

## 2025-06-25 NOTE — ASSESSMENT & PLAN NOTE
Cadence Vasques is a 49 y.o. female with PMH of HTN, tobacco use (0.5 PPD), substance abuse that presents as a transfer from [facility] for higher level of care of acute R MCA stroke.  She initially presented to [facility] ED with LSW, LFD, and dysarthria. LKW >24hrs prior, as family reported seeing her 3-4 days prior with facial droop and dysarthria. Telestroke NIHSS 12. CTH with hypoattenuation c/w recent infarcts, CTA revealed R M1 occlusion. Determined not a candidate for lytics or IR due to LKW being >24hrs prior. She was transferred to [facility] for further eval and stroke workup. MRI brain shows multifocal areas of moderate sized acute infarct involving the R MCA territory with localized mass effect but no MLS. She was evaluated by gen neuro at [facility], given risk for development of edema that would require neurosurgical intervention decision made to transfer patient to [facility] for admission to [facility] for higher level of care and close neuro monitoring.     On admit to [facility] neuro exam significant for LUE/LLE hemiplegia, severe LFD, mod-severe dysarthria, R gaze preference but is able to cross midline/track to L. NIHSS 13. NSGY consulted for hemicrani watch.      Antithrombotics for secondary stroke prevention: Antiplatelets: Aspirin: 81 mg daily  Clopidogrel: 75 mg daily    Statins for secondary stroke prevention and hyperlipidemia, if present: Statins: Atorvastatin- 40 mg daily    Aggressive risk factor modification: Smoking, Diet, Exercise     Rehab efforts: The patient has been evaluated by a stroke team provider and the therapy needs have been fully considered based off the presenting complaints and exam findings. The following therapy evaluations are needed: PT evaluate and treat, OT evaluate and treat, SLP evaluate and treat    Diagnostics ordered/pending: CT scan of head without contrast to asses brain parenchyma    VTE prophylaxis: Heparin 5000 units SQ every 8 hours  Mechanical prophylaxis:  Place SCDs    BP parameters: Infarct: No intervention, SBP <220

## 2025-06-25 NOTE — HPI
Cadence Vasques is a 49 y.o. female transferred to [facility] for R M1 occlusion, presenting with L sided plegia, L FD, Right gaze preference and dysarthria. Family at bedside reports symptoms began Thursday of last week. They noticed the patient was slurring her words and stumbling. Patient is currently on aspirin/plavix. Urine tox positive for cocaine.

## 2025-06-25 NOTE — CONSULTS
Inpatient consult to Physical Medicine Rehab  Consult performed by: Leanne Moran NP  Consult ordered by: Steven Contreras MD  Reason for consult: Rehab      Inpatient consult to Physical Medicine Rehab  Consult performed by: Leanne Moran NP  Consult ordered by: Arnel Pereira NP  Reason for consult: Rehab    Consult received.     GWEN Arriaga, FNP-C  Physical Medicine & Rehabilitation   06/25/2025

## 2025-06-25 NOTE — ED NOTES
Patient placed on oxygen at 2L via NC by RT Mary.   Patient sating at 98 on room air and sating at 100% oxygen.

## 2025-06-25 NOTE — HPI
Cadence Vasques is a 49 y.o. female with PMH of HTN, tobacco use (0.5 PPD), substance abuse that presents as a transfer from [facility] for higher level of care of acute R MCA stroke.  She initially presented to [facility] ED with LSW, LFD, and dysarthria. LKW >24hrs prior, as family reported seeing her 3-4 days prior with facial droop and dysarthria. Telestroke NIHSS 12. CTH with hypoattenuation c/w recent infarcts, CTA revealed R M1 occlusion. Determined not a candidate for lytics or IR due to LKW being >24hrs prior. She was transferred to [facility] for further eval and stroke workup. MRI brain shows multifocal areas of moderate sized acute infarct involving the R MCA territory with localized mass effect but no MLS. She was evaluated by gen neuro at [facility], given risk for development of edema that would require neurosurgical intervention decision made to transfer patient to [facility] for admission to [facility] for higher level of care and close neuro monitoring. On admit to [facility] neuro exam significant for LUE/LLE hemiplegia, severe LFD, mod-severe dysarthria, R gaze preference but is able to cross midline/track to L. NIHSS 13. NSGY consulted for hemicrani watch.

## 2025-06-25 NOTE — SUBJECTIVE & OBJECTIVE
"Prescriptions Prior to Admission[1]    Review of patient's allergies indicates:   Allergen Reactions    Opioids - morphine analogues Hives and Itching     Reaction noted after re-assessment of getting Morphine.        Past Medical History:   Diagnosis Date    Anemia     Fibroid (bleeding) (uterine)     GERD (gastroesophageal reflux disease)     Hypertension      Past Surgical History:   Procedure Laterality Date     SECTION       Family History    None       Tobacco Use    Smoking status: Every Day     Current packs/day: 0.50     Types: Cigarettes    Smokeless tobacco: Not on file   Vaping Use    Vaping status: Every Day   Substance and Sexual Activity    Alcohol use: Yes     Comment: drinks beer once per week    Drug use: Yes     Types: "Crack" cocaine    Sexual activity: Not on file     Review of Systems   Unable to perform ROS: Acuity of condition     Objective:     Weight: 60.5 kg (133 lb 6.1 oz)  Body mass index is 22.89 kg/m².  Vital Signs (Most Recent):  Temp: 98.6 °F (37 °C) (25 1502)  Pulse: 62 (25 1502)  Resp: 18 (25 1502)  BP: 139/80 (25 1502)  SpO2: 98 % (25 1502) Vital Signs (24h Range):  Temp:  [97.7 °F (36.5 °C)-99.7 °F (37.6 °C)] 98.6 °F (37 °C)  Pulse:  [56-92] 62  Resp:  [10-21] 18  SpO2:  [93 %-100 %] 98 %  BP: (109-181)/() 139/80             Neurosurgery Physical Exam  General: Well developed, well nourished.  Head: Normocephalic.  GCS: Motor: 6/Verbal: 5/Eyes: 4 GCS Total: 15  Eyes: Pupils equal, round, reactive to light with accomodation, EOMI. Right gaze preference.  CN: Left sided facial droop.  Motor Strength: Moves right side full strength and to command. Left sided plegia.   Language/speech: Dysarthria.  Pulses: 2+ and symmetric radial and dorsalis pedis.  Skin: Skin is warm, dry and intact.    Significant Labs:  Recent Labs   Lab 25  1855         K 3.5      CO2 26   BUN 10   CREATININE 0.4*   CALCIUM 9.2     Recent Labs "   Lab 06/24/25  1857   WBC 8.95   HGB 14.4   HCT 42.2        Recent Labs   Lab 06/24/25  1855 06/25/25  0544   INR 1.1 1.1   APTT  --  26.1     Microbiology Results (last 7 days)       ** No results found for the last 168 hours. **          All pertinent labs from the last 24 hours have been reviewed.    Significant Diagnostics:  I have reviewed and interpreted all pertinent imaging results/findings within the past 24 hours.       [1]   Medications Prior to Admission   Medication Sig Dispense Refill Last Dose/Taking    [START ON 6/26/2025] aspirin (ECOTRIN) 81 MG EC tablet Take 1 tablet (81 mg total) by mouth once daily. 360 tablet 0     [START ON 6/26/2025] atorvastatin (LIPITOR) 40 MG tablet Take 1 tablet (40 mg total) by mouth once daily. 90 tablet 3     [START ON 6/26/2025] clopidogreL (PLAVIX) 75 mg tablet Take 1 tablet (75 mg total) by mouth once daily. for 21 days 21 tablet 0     FERROUS FUMARATE (IRON ORAL) Take by mouth.       omeprazole (PRILOSEC) 20 MG capsule Take 20 mg by mouth once daily.

## 2025-06-25 NOTE — SUBJECTIVE & OBJECTIVE
Past Medical History:   Diagnosis Date    Anemia     Fibroid (bleeding) (uterine)     GERD (gastroesophageal reflux disease)     Hypertension      Past Surgical History:   Procedure Laterality Date     SECTION       Social History[1]  Review of patient's allergies indicates:   Allergen Reactions    Opioids - morphine analogues Hives and Itching     Reaction noted after re-assessment of getting Morphine.        Medications: I have reviewed the current medication administration record.    Prescriptions Prior to Admission[2]    Review of Systems   Constitutional:  Negative for fatigue.   HENT:  Negative for drooling and trouble swallowing.    Eyes:  Negative for visual disturbance.   Respiratory:  Negative for choking.    Cardiovascular:  Negative for palpitations.   Gastrointestinal:  Negative for nausea and vomiting.   Musculoskeletal:  Negative for neck stiffness.   Skin:  Negative for color change.   Neurological:  Positive for facial asymmetry, speech difficulty and weakness. Negative for numbness and headaches.   Psychiatric/Behavioral:  Negative for confusion.    All other systems reviewed and are negative.    Objective:     Vital Signs (Most Recent):  Temp: 98.6 °F (37 °C) (25 1502)  Pulse: 62 (25 1502)  Resp: 18 (25 1502)  BP: 139/80 (25 1502)  SpO2: 98 % (25 1502)    Vital Signs Range (Last 24H):  Temp:  [97.7 °F (36.5 °C)-99.7 °F (37.6 °C)]   Pulse:  [56-92]   Resp:  [10-21]   BP: (109-181)/()   SpO2:  [93 %-100 %]        Physical Exam  Vitals and nursing note reviewed.   Constitutional:       General: She is not in acute distress.  HENT:      Head: Normocephalic.      Nose: Nose normal.   Eyes:      Conjunctiva/sclera: Conjunctivae normal.      Comments: R gaze preference but tracks to L   Cardiovascular:      Rate and Rhythm: Normal rate.   Pulmonary:      Effort: Pulmonary effort is normal. No respiratory distress.   Skin:     General: Skin is warm.    Neurological:      Mental Status: She is alert.      Cranial Nerves: Dysarthria and facial asymmetry present.      Motor: Weakness present.      Comments: See below for neuro exam   Psychiatric:         Behavior: Behavior normal. Behavior is cooperative.              Neurological Exam:   LOC: alert  Attention Span: Good   Language: Expressive aphasia  Articulation: Dysarthria  Orientation: Person, Place, Time   Visual Fields: Full  EOM (CN III, IV, VI): Gaze preference  right  Facial Movement (CN VII): Lower facial weakness on the Left  Motor:      ---LUE Plegia 0/5     ---LLE  Plegia 0/5     ---RUE Normal 5/5     ---RLE Normal 5/5  Sensation: intact to light touch        Laboratory:  CMP:   Recent Labs   Lab 06/24/25  1855   CALCIUM 9.2   ALBUMIN 4.3   PROT 8.1      K 3.5   CO2 26      BUN 10   CREATININE 0.4*   ALKPHOS 83   ALT 11   AST 19   BILITOT 0.6     CBC:   Recent Labs   Lab 06/24/25  1857   WBC 8.95   RBC 4.64   HGB 14.4   HCT 42.2      MCV 91   MCH 31.0   MCHC 34.1     Lipid Panel:   Recent Labs   Lab 06/24/25  1855   CHOL 236*   LDLCALC 152.4   HDL 74   TRIG 48     Coagulation:   Recent Labs   Lab 06/25/25  0544   INR 1.1   APTT 26.1     Hgb A1C:   Recent Labs   Lab 06/25/25  0544   HGBA1C 5.5     TSH:   Recent Labs   Lab 06/24/25  1855   TSH 0.347*       Diagnostic Results:      Brain imaging:  MRI brain without contrast 6/25/2025  Impression:  1. Motion compromised, prematurely terminated study.  2. Corresponding to findings on prior CT/CTA imaging of 06/24/2025, multifocal areas of expansile restricted diffusion T2 weighted signal abnormality are noted throughout the right MCA territory in keeping with acute to early subacute infarct.  No definite macroscopic hemorrhage or significant mass effect at the present time.  3. Diminutive flow related signal of right MCA branches within the sylvian fissure, in keeping with distal M1 occlusion demonstrated on prior CTA performed  "06/24/2025.  Serpiginous FLAIR hyperintense signal in this region in keeping with slow and/or disorganized flow due to the proximal occlusion.    CTH without contrast 6/25/2025  Impression:  Findings concerning for multifocal recent infarcts throughout the right cerebral hemisphere, likely acute/subacute.  No parenchymal or extra-axial hemorrhage.  Further evaluation with MRI brain and/or CTA head and neck can be obtained as warranted.      Vessel Imaging:  CTA head and neck 6/25/2025  Impression:  1. No acute intracranial abnormality.  2. No large vessel occlusion.  3.  Partially imaged 3.8 cm circumscribed soft tissue density/ mass extending from the superior mediastinum to the right suprahilar region of unclear etiology.      Cardiac Evaluation:   TTE 6/25/2025    Left Ventricle: The left ventricle is normal in size. Normal wall thickness. There is normal systolic function. Quantitated ejection fraction is 62%. There is normal diastolic function.    Right Ventricle: The right ventricle is normal in size measuring 3.4 cm. Wall thickness is normal. Systolic function is normal.    Aortic Valve: There is mild aortic regurgitation.    Mitral Valve: Mildly thickened anterior leaflet of undetermined significance.  Consider KAREN for better evaluation of the mitral valve in the setting of stroke    Pulmonary Artery: The estimated pulmonary artery systolic pressure is 17 mmHg.    IVC/SVC: Normal venous pressure at 3 mmHg.    Study is negative for shunt.           [1]   Social History  Tobacco Use    Smoking status: Every Day     Current packs/day: 0.50     Types: Cigarettes   Vaping Use    Vaping status: Every Day   Substance Use Topics    Alcohol use: Yes     Comment: drinks beer once per week    Drug use: Yes     Types: "Crack" cocaine   [2]   Medications Prior to Admission   Medication Sig Dispense Refill Last Dose/Taking    [START ON 6/26/2025] aspirin (ECOTRIN) 81 MG EC tablet Take 1 tablet (81 mg total) by mouth once " daily. 360 tablet 0     [START ON 6/26/2025] atorvastatin (LIPITOR) 40 MG tablet Take 1 tablet (40 mg total) by mouth once daily. 90 tablet 3     [START ON 6/26/2025] clopidogreL (PLAVIX) 75 mg tablet Take 1 tablet (75 mg total) by mouth once daily. for 21 days 21 tablet 0     FERROUS FUMARATE (IRON ORAL) Take by mouth.       omeprazole (PRILOSEC) 20 MG capsule Take 20 mg by mouth once daily.

## 2025-06-25 NOTE — ED NOTES
Assumed care of pt. Pt lying in bed comfortably, chest rising and falling. Pt AAOx3, VSS, NAD noted. Pt reports 7/10 pain to back. Acetaminophen infusing via PIV. Denies SOB, fever, chills, and claustrophobia. Call light within reach.

## 2025-06-25 NOTE — ASSESSMENT & PLAN NOTE
-Stroke risk factor  - on cessation  -Consider nicotine patch PRN  -Referral for smoking cessation at discharge if patient agreeable   No

## 2025-06-25 NOTE — HOSPITAL COURSE
Patient was admitted for left-sided weakness, slurred speech and facial droop, CT head was concerning for multifocal recent infarcts throughout the right cerebral hemisphere, likely acute/subacute , MRI brain is Corresponding to findings on prior CT/CTA imaging of 06/24/2025, multifocal areas of expansile restricted diffusion T2 weighted signal abnormality are noted throughout the right MCA territory in keeping with acute to early subacute infarct.   Neuro and vascular neurology evaluated the patient and given concern of progression of her symptoms, recommendation was to transfer to neuro critical care at the main [facility] for closer neuro vascular monitoring    I met the patient's sister at bedside and updated her about the above

## 2025-06-25 NOTE — ED NOTES
Tele-neuro in progress.    Mart-1 - Negative Histology Text: MART-1 staining demonstrates a normal density and pattern of melanocytes along the dermal-epidermal junction. The surgical margins are negative for tumor cells.

## 2025-06-25 NOTE — ASSESSMENT & PLAN NOTE
Cadence Vasques is a 49 y.o. female transferred to [facility] for R M1 occlusion, presenting with L sided plegia, L FD, Right gaze preference and dysarthria. Family at bedside reports symptoms began Thursday of last week. They noticed the patient was slurring her words and stumbling. Patient is currently on aspirin/plavix. Urine tox positive for cocaine.     CTH 6/24: No midline shift. There is mild effacement of the right lateral ventricle frontal horn.  CTA 6/24: No LVO.  MRI Brain 6/25: R MCA infarct.    PSD 6?    - Admitted to [facility].  - q1h neurochecks.  - No acute neurosurgical intervention.   - F/u repeat CTH tomorrow AM.  - Medical management per [facility].    D/w Dr. Haywood.

## 2025-06-25 NOTE — PLAN OF CARE
Problem: Physical Therapy  Goal: Physical Therapy Goal  Description: Goals to be met by: 25     Patient will increase functional independence with mobility by performin. Supine to sit with Contact Guard Assistance  2. Sit to supine with Contact Guard Assistance  3. Sit to stand transfer with Contact Guard Assistance with LRAD.   4. Bed to chair transfer with Minimal Assistance using LRAD.     Outcome: Progressing     PT/OT co-evaluation completed due to anticipated complexity of pt's presentation. Pt's PLOF: Independent with no AD. Pt at this time requires MOD Ax2 with bed mobility and MAX Ax2 with transfers to standing with use of RW. PT recommending high intensity therapy to assist pt in return to independent PLOF. Therapy will continue to progress pt as able.

## 2025-06-25 NOTE — ED NOTES
Per dietician, pt on full liquid diet and may have pureed foods. Pt AAOx3, VSS, NAD noted. Denies pain, SOB, needs at this time. Sister at bedside. Call light within reach.

## 2025-06-25 NOTE — CARE UPDATE
CTA reviewed at 8pm.    R distal M1 occlusion noted. No high grade EC carotid stenosis.  NIHSS 12 --> LS plegia, L facial weakness, dysarthria and possible L superior quadrantanopsia.    Upon further discussion with the ED attending, a family member reported seeing the patient 4-5 days ago with left facial weakness and was also stumbling.    Due to suspected LKN of well > 24 hrs, she is not an interventional candidate.      Discussed findings and recommendations with ED attending.  Advised transfer to the closest capable center for MRI and further stroke workup.          Burton Lee MD  Vascular Neurology  [facility]

## 2025-06-25 NOTE — H&P
[place] - Emergency Dept  [facility] Medicine  History & Physical    Patient Name: Cadence Vasques  MRN: [id number]  Patient Class: IP- Inpatient  Admission Date: 2025  Attending Physician: Joelle Fermin MD   Primary Care Provider: Joyce Primary Doctor         Patient information was obtained from patient and ER records.     Subjective:     Principal Problem:<principal problem not specified>    Chief Complaint:   Chief Complaint   Patient presents with    Cerebrovascular Accident     Pt arrived via EMS with stroke symptoms. EMS reports LKW 2200 last night. Pt woke up at 1500 today with left sided weakness, left facial droop, and slurred speech.         HPI: 50 y/o female with a history of HTN, fibroids who presents with left sided weakness, paresthesias, facial droop and slurred speech initially thought to have last known well at 10:00 p.m. last night however family bedside reporting patient has been stumbling and having slurred words over the last 3-4 days.  At [facility] patient ruled out for any lytic therapy.  Started on aspirin and Plavix load.  CTA reviewed by neurovascular R distal M1 occlusion noted.  Urine tox positive for cocaine.  Patient transferred to [facility] for MRI and stroke workup.  NIHSS of 12 on admission which remains after transfer.  Admit to hospital medicine neurology consulted.    Past Medical History:   Diagnosis Date    Anemia     Fibroid (bleeding) (uterine)     GERD (gastroesophageal reflux disease)     Hypertension        Past Surgical History:   Procedure Laterality Date     SECTION         Review of patient's allergies indicates:   Allergen Reactions    Opioids - morphine analogues Hives and Itching     Reaction noted after re-assessment of getting Morphine.        No current facility-administered medications on file prior to encounter.     Current Outpatient Medications on File Prior to Encounter   Medication Sig    FERROUS FUMARATE (IRON ORAL) Take by  "mouth.    ibuprofen (ADVIL,MOTRIN) 600 MG tablet Take 600 mg by mouth 3 (three) times daily.    ibuprofen (ADVIL,MOTRIN) 800 MG tablet Take 1 tablet (800 mg total) by mouth every 6 (six) hours as needed for Pain.    lisinopril (PRINIVIL,ZESTRIL) 20 MG tablet Take 20 mg by mouth once daily.    omeprazole (PRILOSEC) 20 MG capsule Take 20 mg by mouth once daily.    ondansetron (ZOFRAN-ODT) 8 MG TbDL Take 1 tablet (8 mg total) by mouth 3 (three) times daily as needed.    prazosin (MINIPRESS) 1 MG Cap Take 1 mg by mouth 2 (two) times daily.    traZODone (DESYREL) 50 MG tablet Take 50 mg by mouth every evening.     Family History    None       Tobacco Use    Smoking status: Every Day     Current packs/day: 0.50     Types: Cigarettes    Smokeless tobacco: Not on file   Vaping Use    Vaping status: Every Day   Substance and Sexual Activity    Alcohol use: Yes     Comment: drinks beer once per week    Drug use: Yes     Types: "Crack" cocaine    Sexual activity: Not on file     Review of Systems   HENT:  Positive for voice change.    Neurological:  Positive for weakness (focal left-sided) and numbness (left-sided).   All other systems reviewed and are negative.    Objective:     Vital Signs (Most Recent):  Temp: 98.6 °F (37 °C) (06/25/25 0712)  Pulse: 71 (06/25/25 0702)  Resp: 18 (06/25/25 0702)  BP: 130/69 (06/25/25 0702)  SpO2: 97 % (06/25/25 0702) Vital Signs (24h Range):  Temp:  [97.7 °F (36.5 °C)-99.7 °F (37.6 °C)] 98.6 °F (37 °C)  Pulse:  [67-92] 71  Resp:  [10-21] 18  SpO2:  [93 %-100 %] 97 %  BP: (109-181)/() 130/69     Weight: 62.4 kg (137 lb 9.6 oz)  Body mass index is 23.62 kg/m².     Physical Exam  Vitals reviewed.   Constitutional:       Appearance: She is ill-appearing.      Comments: Left-sided facial droop, left-sided hemiplegia, left-sided sensory deficit   HENT:      Head:      Comments: Left-sided facial droop     Nose: Nose normal.      Mouth/Throat:      Mouth: Mucous membranes are dry.      " Pharynx: Oropharynx is clear.   Eyes:      Pupils: Pupils are equal, round, and reactive to light.   Cardiovascular:      Rate and Rhythm: Normal rate and regular rhythm.      Pulses: Normal pulses.      Heart sounds: Normal heart sounds.   Pulmonary:      Effort: Pulmonary effort is normal.      Breath sounds: Normal breath sounds.   Abdominal:      General: Bowel sounds are normal.      Palpations: Abdomen is soft.   Musculoskeletal:      Cervical back: Normal range of motion.      Comments: Luis Antonio paresis left-sided   Skin:     General: Skin is warm and dry.      Capillary Refill: Capillary refill takes less than 2 seconds.   Neurological:      Mental Status: She is alert and oriented to person, place, and time.      Comments: Left-sided facial droop and hemiparesis.  Left-sided Sensory deficit            CRANIAL NERVES     CN III, IV, VI   Pupils are equal, round, and reactive to light.       Significant Labs: All pertinent labs within the past 24 hours have been reviewed.  Recent Lab Results         06/25/25  0544   06/24/25  2136   06/24/25  1857   06/24/25  1855        Phencyclidine       Negative       Albumin       4.3       ALP       83       ALT       11       Amphetamines, Urine       Negative       Anion Gap       9       PTT 26.1  Comment: Refer to local heparin nomogram for intensity/dose specific therapeutic range.             AST       19       Barbituates, Urine       Negative       Baso #     0.05         Basophil %     0.6         Benzodiazepine, Urine       Negative       BILIRUBIN TOTAL       0.6  Comment: For infants and newborns, interpretation of results should be based   on gestational age, weight and in agreement with clinical   observations.    Premature Infant recommended reference ranges:   0-24 hours:  <8.0 mg/dL   24-48 hours: <12.0 mg/dL   3-5 days:    <15.0 mg/dL   6-29 days:   <15.0 mg/dL       BUN       10       Calcium       9.2       Chloride       104       CO2       26        Cocaine, Urine       Presumptive Positive       Creatinine       0.4       Urine Creatinine       66.4       eGFR       >60  Comment: Estimated GFR calculated using the CKD-EPI creatinine (2021) equation.       Eos #     0.00         Eos %     0.0         Estimated Avg Glucose 111             Glucose       102       Gran # (ANC)     6.50         Hematocrit     42.2         Hemoglobin     14.4         Hemoglobin A1C External 5.5  Comment: ADA Screening Guidelines:  5.7-6.4%  Consistent with prediabetes  >=6.5%  Consistent with diabetes    High levels of fetal hemoglobin interfere with the HbA1C  assay. Heterozygous hemoglobin variants (HbS, HgC, etc)do  not significantly interfere with this assay.   However, presence of multiple variants may affect accuracy.             Immature Grans (Abs)     0.02  Comment: Mild elevation in immature granulocytes is non specific and can be seen in a variety of conditions including stress response, acute inflammation, trauma and pregnancy. Correlation with other laboratory and clinical findings is essential.         Immature Granulocytes     0.2         INR 1.1  Comment: Coumadin Therapy:    2.0 - 3.0 for INR for all indicators except mechanical heart valves    and antiphospholipid syndromes which should use 2.5 - 3.5.       1.1  Comment: Coumadin Therapy:    2.0 - 3.0 for INR for all indicators except mechanical heart valves    and antiphospholipid syndromes which should use 2.5 - 3.5.       Lymph #     1.89         Lymph %     21.1         MCH     31.0         MCHC     34.1         MCV     91         Methadone Screen, Urine       Negative       Mono #     0.49         Mono %     5.5         MPV     10.3         Neut %     72.6         nRBC     0         Opiates, Urine       Negative       Platelet Count     303         Potassium       3.5       hCG Qualitative, Urine   Negative           PROTEIN TOTAL       8.1       PT 12.0       11.8        Acceptable   Yes            RBC     4.64         RDW     13.0         Sodium       139       Marijuana (THC) Metabolite       Negative       Troponin I 0.006  Comment: The reference interval for Troponin I represents the 99th percentile cutoff for our facility and is consistent with 3rd generation assay performance.             TSH       0.347       WBC     8.95                 Significant Imaging: I have reviewed all pertinent imaging results/findings within the past 24 hours.  I have reviewed and interpreted all pertinent imaging results/findings within the past 24 hours.   Assessment/Plan:     Assessment & Plan  Tobacco dependency  Dangers of cigarette smoking were reviewed with patient in detail. Patient was Counseled for 3-10 minutes. Nicotine replacement options were discussed. Nicotine replacement was discussed- not prescribed per patient's request  CVA (cerebral vascular accident)    Antithrombotics for secondary stroke prevention: Antiplatelets: Aspirin: 81 mg daily  Clopidogrel: 300 mg loading dose x 1, now  Clopidogrel: 75 mg daily    Statins for secondary stroke prevention and hyperlipidemia, if present:   Statins: Atorvastatin- 40 mg daily    Aggressive risk factor modification: HTN, cocaine use     Rehab efforts: The patient has been evaluated by a stroke team provider and the therapy needs have been fully considered based off the presenting complaints and exam findings. The following therapy evaluations are needed: PT evaluate and treat, OT evaluate and treat, SLP evaluate and treat, PM&R evaluate for appropriate placement    Diagnostics ordered/pending: CT scan of head without contrast to asses brain parenchyma, CTA Head to assess vasculature , CTA Neck/Arch to assess vasculature, HgbA1C to assess blood glucose levels, Lipid Profile to assess cholesterol levels, MRI head without contrast to assess brain parenchyma, TTE to assess cardiac function/status     VTE prophylaxis: Enoxaparin 40 mg SQ every 24 hours    BP parameters:  Infarct: No intervention, SBP <220    - MRI brain. Permissive HTN w/ BP<220/120 for the next 48-72 hrs.  --aspirin 325 and Plavix 300 load on admission  -- HOB flat   -- Lipitor 40mg daily.   --ASA 81mg + Plavix 75mg x21 day, followed by monotherapy with ASA 81mg thereafter.   -- Check Lipid panel,   --A1c,   --Utox--positive cocaine  -- Target LDL<70, A1c<7   -- TTE w/ bubble   -- PT/OT/SLP  --neurology consult    VTE Risk Mitigation (From admission, onward)           Ordered     enoxaparin injection 40 mg  Every 24 hours         06/25/25 0740     IP VTE LOW RISK PATIENT  Once         06/25/25 0149     Place sequential compression device  Until discontinued         06/25/25 0149                                                Arnel Pereira NP  Department of Hospital Medicine  [place] - Emergency Dept

## 2025-06-25 NOTE — NURSING
Patient arrived to [facility] from [facility] ED to [facility] 6261    Report received from: OS RN, NISH Lowe    Type of stroke/diagnosis: R Distal M1 Occlusion    Current symptoms: AAOx4, LSW, Left Facial Droop, numbness on L side, withdraws on L side, follows commands on R side    Skin Assessment done: Y  Wounds noted: none  Skin Assessment Verified by:  NISH Lazo Completed? Y, Speech Eval done today at [facility] ED    Patient Belongings on Admit: none    NCC notified: MD Derrek

## 2025-06-25 NOTE — SUBJECTIVE & OBJECTIVE
"Past Medical History:   Diagnosis Date    Anemia     Fibroid (bleeding) (uterine)     GERD (gastroesophageal reflux disease)     Hypertension        Past Surgical History:   Procedure Laterality Date     SECTION         Review of patient's allergies indicates:   Allergen Reactions    Opioids - morphine analogues Hives and Itching     Reaction noted after re-assessment of getting Morphine.        No current facility-administered medications on file prior to encounter.     Current Outpatient Medications on File Prior to Encounter   Medication Sig    FERROUS FUMARATE (IRON ORAL) Take by mouth.    ibuprofen (ADVIL,MOTRIN) 600 MG tablet Take 600 mg by mouth 3 (three) times daily.    ibuprofen (ADVIL,MOTRIN) 800 MG tablet Take 1 tablet (800 mg total) by mouth every 6 (six) hours as needed for Pain.    lisinopril (PRINIVIL,ZESTRIL) 20 MG tablet Take 20 mg by mouth once daily.    omeprazole (PRILOSEC) 20 MG capsule Take 20 mg by mouth once daily.    ondansetron (ZOFRAN-ODT) 8 MG TbDL Take 1 tablet (8 mg total) by mouth 3 (three) times daily as needed.    prazosin (MINIPRESS) 1 MG Cap Take 1 mg by mouth 2 (two) times daily.    traZODone (DESYREL) 50 MG tablet Take 50 mg by mouth every evening.     Family History    None       Tobacco Use    Smoking status: Every Day     Current packs/day: 0.50     Types: Cigarettes    Smokeless tobacco: Not on file   Vaping Use    Vaping status: Every Day   Substance and Sexual Activity    Alcohol use: Yes     Comment: drinks beer once per week    Drug use: Yes     Types: "Crack" cocaine    Sexual activity: Not on file     Review of Systems   HENT:  Positive for voice change.    Neurological:  Positive for weakness (focal left-sided) and numbness (left-sided).   All other systems reviewed and are negative.    Objective:     Vital Signs (Most Recent):  Temp: 98.6 °F (37 °C) (25 0712)  Pulse: 71 (25 0702)  Resp: 18 (25 07)  BP: 130/69 (25 " 0702)  SpO2: 97 % (06/25/25 0702) Vital Signs (24h Range):  Temp:  [97.7 °F (36.5 °C)-99.7 °F (37.6 °C)] 98.6 °F (37 °C)  Pulse:  [67-92] 71  Resp:  [10-21] 18  SpO2:  [93 %-100 %] 97 %  BP: (109-181)/() 130/69     Weight: 62.4 kg (137 lb 9.6 oz)  Body mass index is 23.62 kg/m².     Physical Exam  Vitals reviewed.   Constitutional:       Appearance: She is ill-appearing.      Comments: Left-sided facial droop, left-sided hemiplegia, left-sided sensory deficit   HENT:      Head:      Comments: Left-sided facial droop     Nose: Nose normal.      Mouth/Throat:      Mouth: Mucous membranes are dry.      Pharynx: Oropharynx is clear.   Eyes:      Pupils: Pupils are equal, round, and reactive to light.   Cardiovascular:      Rate and Rhythm: Normal rate and regular rhythm.      Pulses: Normal pulses.      Heart sounds: Normal heart sounds.   Pulmonary:      Effort: Pulmonary effort is normal.      Breath sounds: Normal breath sounds.   Abdominal:      General: Bowel sounds are normal.      Palpations: Abdomen is soft.   Musculoskeletal:      Cervical back: Normal range of motion.      Comments: Luis Antonio paresis left-sided   Skin:     General: Skin is warm and dry.      Capillary Refill: Capillary refill takes less than 2 seconds.   Neurological:      Mental Status: She is alert and oriented to person, place, and time.      Comments: Left-sided facial droop and hemiparesis.  Left-sided Sensory deficit            CRANIAL NERVES     CN III, IV, VI   Pupils are equal, round, and reactive to light.       Significant Labs: All pertinent labs within the past 24 hours have been reviewed.  Recent Lab Results         06/25/25  0544   06/24/25  2136   06/24/25  1857   06/24/25  1855        Phencyclidine       Negative       Albumin       4.3       ALP       83       ALT       11       Amphetamines, Urine       Negative       Anion Gap       9       PTT 26.1  Comment: Refer to local heparin nomogram for intensity/dose specific  therapeutic range.             AST       19       Barbituates, Urine       Negative       Baso #     0.05         Basophil %     0.6         Benzodiazepine, Urine       Negative       BILIRUBIN TOTAL       0.6  Comment: For infants and newborns, interpretation of results should be based   on gestational age, weight and in agreement with clinical   observations.    Premature Infant recommended reference ranges:   0-24 hours:  <8.0 mg/dL   24-48 hours: <12.0 mg/dL   3-5 days:    <15.0 mg/dL   6-29 days:   <15.0 mg/dL       BUN       10       Calcium       9.2       Chloride       104       CO2       26       Cocaine, Urine       Presumptive Positive       Creatinine       0.4       Urine Creatinine       66.4       eGFR       >60  Comment: Estimated GFR calculated using the CKD-EPI creatinine (2021) equation.       Eos #     0.00         Eos %     0.0         Estimated Avg Glucose 111             Glucose       102       Gran # (ANC)     6.50         Hematocrit     42.2         Hemoglobin     14.4         Hemoglobin A1C External 5.5  Comment: ADA Screening Guidelines:  5.7-6.4%  Consistent with prediabetes  >=6.5%  Consistent with diabetes    High levels of fetal hemoglobin interfere with the HbA1C  assay. Heterozygous hemoglobin variants (HbS, HgC, etc)do  not significantly interfere with this assay.   However, presence of multiple variants may affect accuracy.             Immature Grans (Abs)     0.02  Comment: Mild elevation in immature granulocytes is non specific and can be seen in a variety of conditions including stress response, acute inflammation, trauma and pregnancy. Correlation with other laboratory and clinical findings is essential.         Immature Granulocytes     0.2         INR 1.1  Comment: Coumadin Therapy:    2.0 - 3.0 for INR for all indicators except mechanical heart valves    and antiphospholipid syndromes which should use 2.5 - 3.5.       1.1  Comment: Coumadin Therapy:    2.0 - 3.0 for INR for  all indicators except mechanical heart valves    and antiphospholipid syndromes which should use 2.5 - 3.5.       Lymph #     1.89         Lymph %     21.1         MCH     31.0         MCHC     34.1         MCV     91         Methadone Screen, Urine       Negative       Mono #     0.49         Mono %     5.5         MPV     10.3         Neut %     72.6         nRBC     0         Opiates, Urine       Negative       Platelet Count     303         Potassium       3.5       hCG Qualitative, Urine   Negative           PROTEIN TOTAL       8.1       PT 12.0       11.8        Acceptable   Yes           RBC     4.64         RDW     13.0         Sodium       139       Marijuana (THC) Metabolite       Negative       Troponin I 0.006  Comment: The reference interval for Troponin I represents the 99th percentile cutoff for our facility and is consistent with 3rd generation assay performance.             TSH       0.347       WBC     8.95                 Significant Imaging: I have reviewed all pertinent imaging results/findings within the past 24 hours.  I have reviewed and interpreted all pertinent imaging results/findings within the past 24 hours.

## 2025-06-25 NOTE — PHARMACY MED REC
"[facility]           Pharmacy  Admission Medication History     The home medication history was taken by Tori Sanders.      Medication history obtained from Medications listed below were obtained from: [employer] software- [employer] and Medical records. No current medications listed.    Based on information gathered for medication list, you may go to "Admission" then "Reconcile Home Medications" tabs to review and/or act upon those items.     The home medication list has been updated by the Pharmacy department.   Please read ALL comments highlighted in yellow.   Please address this information as you see fit.    Feel free to contact us if you have any questions or require assistance.        Medications Ordered Prior to Encounter[1]    Please address this information as you see fit.  Feel free to contact us if you have any questions or require assistance.    Tori Sadners  [phone]                  .               [1]   No current facility-administered medications on file prior to encounter.     Current Outpatient Medications on File Prior to Encounter   Medication Sig Dispense Refill    FERROUS FUMARATE (IRON ORAL) Take by mouth.      ibuprofen (ADVIL,MOTRIN) 600 MG tablet Take 600 mg by mouth 3 (three) times daily.      ibuprofen (ADVIL,MOTRIN) 800 MG tablet Take 1 tablet (800 mg total) by mouth every 6 (six) hours as needed for Pain. 20 tablet 0    lisinopril (PRINIVIL,ZESTRIL) 20 MG tablet Take 20 mg by mouth once daily.      omeprazole (PRILOSEC) 20 MG capsule Take 20 mg by mouth once daily.      ondansetron (ZOFRAN-ODT) 8 MG TbDL Take 1 tablet (8 mg total) by mouth 3 (three) times daily as needed. 15 tablet 0    prazosin (MINIPRESS) 1 MG Cap Take 1 mg by mouth 2 (two) times daily.      traZODone (DESYREL) 50 MG tablet Take 50 mg by mouth every evening.       "

## 2025-06-25 NOTE — CONSULTS
NEUROLOGY PLAN OF CARE     Reason for consult:  CVA    HPI:   Cadence Vasques is a 49 y.o. w/ Hx of HTN who presented yesterday with left sided weakness, L facial droop, and dysarthria upon wakening. [facility] neurology consulted for CVA.    Neurological Examination   Patient discharged from ED/transferred to [facility] prior to bedside evaluation     LABORATORY STUDIES:  I have personally reviewed patient's labs for this admission.    Utox +cocaine     A1C 5.5%     NEURORADIOLOGY/NEURODIAGNOSTIC STUDIES:  I have personally reviewed the images and neurodiagnostic studies performed.     CTH/CTA Head and Neck  Impression:  Findings concerning for multifocal recent infarcts throughout the right cerebral hemisphere, likely acute/subacute.  No parenchymal or extra-axial hemorrhage.  Further evaluation with MRI brain and/or CTA head and neck can be obtained as warranted.    Impression:  1. R M1 occlusion     MRI Brain  Impression:     1. Motion compromised, prematurely terminated study.  2. Corresponding to findings on prior CT/CTA imaging of 06/24/2025, multifocal areas of expansile restricted diffusion T2 weighted signal abnormality are noted throughout the right MCA territory in keeping with acute to early subacute infarct.  No definite macroscopic hemorrhage or significant mass effect at the present time.  3. Diminutive flow related signal of right MCA branches within the sylvian fissure, in keeping with distal M1 occlusion demonstrated on prior CTA performed 06/24/2025.  Serpiginous FLAIR hyperintense signal in this region in keeping with slow and/or disorganized flow due to the proximal occlusion.  This report was flagged in Epic as abnormal.    TTE pending     Assessment:  Plan   Cadence Vasques is a 49 y.o. w/ Hx of HTN who presented yesterday with left sided weakness, L facial droop, and dysarthria upon wakening. Initial NIHSS 12. Initial CTH and CTA notable for R M1 occlusion with  "resulting R MCA infarcts. MRI brain completed, notable R MCA territory infarct with effacement of lateral ventricle. Teleneurology consulted in ED, s/p load of ASA and Plavix. Discussed with neurovascular attending Dr. Lofton at [facility], agree with transfer to [facility] for NCC services.       Ravindra Tate" MD Heather  [facility] Neurology PGY-3    "

## 2025-06-25 NOTE — H&P
[facility] - Neuro Critical Care  Neurocritical Care  History & Physical    Admit Date: 2025  Service Date: 2025  Length of Stay: 0    Subjective:     Chief Complaint: Cerebrovascular accident (CVA) due to occlusion of right middle cerebral artery    History of Present Illness: Cadence Vasques is a 49 y.o. female w/ PMH of HTN, GERD, anemia, fibroids, tobacco use, and cocaine use who presented to [facility] ED for LSW, LS numbness, LFD, and dysarthria that began at least 4-5 days prior to her presentation. Patient initially transferred to another outside hospital and was then transferred to [facility] due to concern for worsening exam findings. NIH was reported as 12 on her initial presentation. CTA showed R distal M1 occlusion. MRI showed multifocal areas of diffusion restriction throughout the R MCA distribution. Patient transferred to [facility] NCC for HLOC.      Past Medical History:   Diagnosis Date    Anemia     Fibroid (bleeding) (uterine)     GERD (gastroesophageal reflux disease)     Hypertension      Past Surgical History:   Procedure Laterality Date     SECTION        Medications Ordered Prior to Encounter[1]   Allergies: Opioids - morphine analogues  No family history on file.  Social History[2]  Review of Systems   Neurological:  Positive for weakness and numbness.     Objective:     Vitals:    Temp: 98.8 °F (37.1 °C)  Pulse: (!) 58  BP: (!) 159/82  MAP (mmHg): 110  Resp: 18  SpO2: 100 %    Temp  Min: 97.7 °F (36.5 °C)  Max: 99.7 °F (37.6 °C)  Pulse  Min: 56  Max: 92  BP  Min: 109/89  Max: 181/94  MAP (mmHg)  Min: 84  Max: 136  Resp  Min: 10  Max: 21  SpO2  Min: 93 %  Max: 100 %    No intake/output data recorded.            Physical Exam  Constitutional:       General: She is not in acute distress.  HENT:      Mouth/Throat:      Mouth: Mucous membranes are moist.      Pharynx: Oropharynx is clear.   Eyes:      Extraocular Movements: Extraocular movements intact.      Pupils: Pupils are  equal, round, and reactive to light.   Cardiovascular:      Rate and Rhythm: Normal rate and regular rhythm.   Pulmonary:      Effort: Pulmonary effort is normal. No respiratory distress.   Abdominal:      General: Abdomen is flat. There is no distension.   Neurological:      Mental Status: She is alert.      GCS: GCS eye subscore is 4. GCS verbal subscore is 5. GCS motor subscore is 6.      Cranial Nerves: Dysarthria and facial asymmetry (LFD) present.      Sensory: Sensory deficit (L sided sensory loss and neglect) present.      Motor: Weakness (LUE/LLE 0/5 strength, both withdraw to pain; RUE/RLE: 5/5 strength) present.      Comments: R gaze preference  L sided neglect          Today I personally reviewed pertinent medications, lines/drains/airways, imaging, laboratory results, notably:    Lab Results   Component Value Date    WBC 8.95 06/24/2025    HGB 14.4 06/24/2025    HCT 42.2 06/24/2025    MCV 91 06/24/2025     06/24/2025       CMP  Sodium   Date Value Ref Range Status   06/24/2025 139 136 - 145 mmol/L Final   04/16/2016 142 136 - 145 mmol/L Final     Potassium   Date Value Ref Range Status   06/24/2025 3.5 3.5 - 5.1 mmol/L Final   04/16/2016 4.0 3.5 - 5.1 mmol/L Final     Chloride   Date Value Ref Range Status   06/24/2025 104 95 - 110 mmol/L Final   04/16/2016 105 95 - 110 mmol/L Final     CO2   Date Value Ref Range Status   06/24/2025 26 23 - 29 mmol/L Final   04/16/2016 22 (L) 23 - 29 mmol/L Final     Glucose   Date Value Ref Range Status   06/24/2025 102 70 - 110 mg/dL Final   04/16/2016 92 70 - 110 mg/dL Final     BUN   Date Value Ref Range Status   06/24/2025 10 7 - 17 mg/dL Final     Creatinine   Date Value Ref Range Status   06/24/2025 0.4 (L) 0.5 - 1.4 mg/dL Final     Calcium   Date Value Ref Range Status   06/24/2025 9.2 8.7 - 10.5 mg/dL Final   04/16/2016 9.5 8.7 - 10.5 mg/dL Final     Protein Total   Date Value Ref Range Status   06/24/2025 8.1 6.0 - 8.4 gm/dL Final     Total Protein    Date Value Ref Range Status   04/16/2016 8.4 6.0 - 8.4 g/dL Final     Albumin   Date Value Ref Range Status   06/24/2025 4.3 3.5 - 5.2 g/dL Final   04/16/2016 4.4 3.5 - 5.2 g/dL Final     Total Bilirubin   Date Value Ref Range Status   04/16/2016 0.6 0.1 - 1.0 mg/dL Final     Comment:     For infants and newborns, interpretation of results should be based  on gestational age, weight and in agreement with clinical  observations.  Premature Infant recommended reference ranges:  Up to 24 hours.............<8.0 mg/dL  Up to 48 hours............<12.0 mg/dL  3-5 days..................<15.0 mg/dL  6-29 days.................<15.0 mg/dL       Bilirubin Total   Date Value Ref Range Status   06/24/2025 0.6 0.1 - 1.0 mg/dL Final     Comment:     For infants and newborns, interpretation of results should be based   on gestational age, weight and in agreement with clinical   observations.    Premature Infant recommended reference ranges:   0-24 hours:  <8.0 mg/dL   24-48 hours: <12.0 mg/dL   3-5 days:    <15.0 mg/dL   6-29 days:   <15.0 mg/dL     Alkaline Phosphatase   Date Value Ref Range Status   04/16/2016 66 38 - 126 IU/L Final     ALP   Date Value Ref Range Status   06/24/2025 83 38 - 126 unit/L Final     AST ([place])   Date Value Ref Range Status   04/16/2016 25 15 - 46 IU/L Final     AST   Date Value Ref Range Status   06/24/2025 19 15 - 46 unit/L Final     ALT   Date Value Ref Range Status   06/24/2025 11 10 - 44 unit/L Final   04/16/2016 22 10 - 44 IU/L Final     Anion Gap   Date Value Ref Range Status   06/24/2025 9 8 - 16 mmol/L Final     eGFR   Date Value Ref Range Status   06/24/2025 >60 >60 mL/min/1.73/m2 Final     Comment:     Estimated GFR calculated using the CKD-EPI creatinine (2021) equation.       Results for orders placed or performed during the hospital encounter of 06/24/25 (from the past 2160 hours)   MRI Brain Without Contrast    Impression    1. Motion compromised, prematurely terminated  study.  2. Corresponding to findings on prior CT/CTA imaging of 06/24/2025, multifocal areas of expansile restricted diffusion T2 weighted signal abnormality are noted throughout the right MCA territory in keeping with acute to early subacute infarct.  No definite macroscopic hemorrhage or significant mass effect at the present time.  3. Diminutive flow related signal of right MCA branches within the sylvian fissure, in keeping with distal M1 occlusion demonstrated on prior CTA performed 06/24/2025.  Serpiginous FLAIR hyperintense signal in this region in keeping with slow and/or disorganized flow due to the proximal occlusion.  This report was flagged in Epic as abnormal.      Electronically signed by: Portillo Garza  Date:    06/25/2025  Time:    09:52           Assessment/Plan:     Neuro  * Cerebrovascular accident (CVA) due to occlusion of right middle cerebral artery  Cadence Vasques is a 49 y.o. female w/ PMH of HTN, GERD, anemia, fibroids, tobacco use, and cocaine use who presented to [facility] ED for LSW, LS numbness, LFD, and speech difficulty that began at least 4-5 days prior to her presentation. Patient initially transferred to an OSH and was then transferred to [facility] due to concern for worsening exam findings. NIH was reported as 12 on her initial presentation. CTA showed R distal M1 occlusion. MRI showed multifocal areas of diffusion restriction throughout the R MCA distribution. UDS + for cocaine. .4. A1c 5.5.  Patient transferred to [facility] NCC for HLOC.     Exam c/w R MCA syndrome. Patient OOW for any intervention. Will closely monitor with q1h neuro checks and avoid hypoperfusion.    Admit to NCC  NSGY consulted given patient's age and size of infarct; no acute intervention at this time per their recommendations  Hourly neuro checks, vital signs, I/Os  CBC, CMP, mag, and phos daily  SBP goal < 220  PRN labetalol and hydralazine  DAPT  Statin   Full liquid diet per SLP recs  Vascular  Neurology consulted  SCD; lovenox  PT/OT/SLP        Psychiatric  Cocaine use  Stroke risk factor. UDS + on admission  - on cessation    Cardiac/Vascular  Essential hypertension  Stroke risk factor  -Gradual reduction of SBP goal given she is approximately 5 days post-stroke    Other  Tobacco dependency  Stroke risk factor.  -encourage cessation            The patient is being Prophylaxed for:  Venous Thromboembolism with: Mechanical or Chemical  Stress Ulcer with: Not Applicable   Ventilator Pneumonia with: not applicable    Activity Orders            Diet Full Liquid: Full Liquid starting at 06/25 1611    Turn patient starting at 06/25 1600    Elevate HOB starting at 06/25 1434          Full Code    Steven Contreras MD  Neurocritical Care  [facility] - Neuro Critical Care       [1]   Current Facility-Administered Medications on File Prior to Encounter   Medication Dose Route Frequency Provider Last Rate Last Admin    [COMPLETED] acetaminophen 1,000 mg/100 mL (10 mg/mL) injection 1,000 mg  1,000 mg Intravenous Once Arnel Pereira, NP   Stopped at 06/25/25 0730    [COMPLETED] aspirin chewable tablet 324 mg  324 mg Oral ED 1 Time Trevin Soto MD   324 mg at 06/24/25 2045    [COMPLETED] clopidogreL tablet 300 mg  300 mg Oral ED 1 Time Trevin Soto MD   300 mg at 06/24/25 2045    [COMPLETED] diphenhydrAMINE injection 50 mg  50 mg Intravenous ED 1 Time Trevin Soto MD   50 mg at 06/24/25 2159    [COMPLETED] iohexoL (OMNIPAQUE 350) injection 75 mL  75 mL Intravenous ONCE PRN Trevin Soto MD   75 mL at 06/24/25 1948    [COMPLETED] morphine injection 2 mg  2 mg Intravenous ED 1 Time Trevin Soto MD   2 mg at 06/24/25 2058    [DISCONTINUED] aspirin EC tablet 81 mg  81 mg Oral Daily Arnel Pereira, NP   81 mg at 06/25/25 0953    [DISCONTINUED] atorvastatin tablet 40 mg  40 mg Oral Daily Arnel Pereira, NP   40 mg at 06/25/25 0953    [DISCONTINUED] bisacodyL  suppository 10 mg  10 mg Rectal Daily PRN Arnel Pereira NP        [DISCONTINUED] clopidogreL tablet 75 mg  75 mg Oral Daily Arnel Pereira NP   75 mg at 06/25/25 0953    [DISCONTINUED] enoxaparin injection 40 mg  40 mg Subcutaneous Q24H (prophylaxis, 1700) Arnel Pereira, LISSETTE        [DISCONTINUED] hydrALAZINE injection 10 mg  10 mg Intravenous Q6H PRN Arnel Pereira, LISSETTE        [DISCONTINUED] ondansetron injection 4 mg  4 mg Intravenous Q12H PRN Arnel Pereira NP        [DISCONTINUED] sodium chloride 0.9% bolus 500 mL 500 mL  500 mL Intravenous PRN Arnel Pereira NP        [DISCONTINUED] sodium chloride 0.9% flush 10 mL  10 mL Intravenous PRN Arnel Pereira NP         Current Outpatient Medications on File Prior to Encounter   Medication Sig Dispense Refill    [START ON 6/26/2025] aspirin (ECOTRIN) 81 MG EC tablet Take 1 tablet (81 mg total) by mouth once daily. 360 tablet 0    [START ON 6/26/2025] atorvastatin (LIPITOR) 40 MG tablet Take 1 tablet (40 mg total) by mouth once daily. 90 tablet 3    [START ON 6/26/2025] clopidogreL (PLAVIX) 75 mg tablet Take 1 tablet (75 mg total) by mouth once daily. for 21 days 21 tablet 0    FERROUS FUMARATE (IRON ORAL) Take by mouth.      omeprazole (PRILOSEC) 20 MG capsule Take 20 mg by mouth once daily.      [DISCONTINUED] ibuprofen (ADVIL,MOTRIN) 600 MG tablet Take 600 mg by mouth 3 (three) times daily.      [DISCONTINUED] ibuprofen (ADVIL,MOTRIN) 800 MG tablet Take 1 tablet (800 mg total) by mouth every 6 (six) hours as needed for Pain. 20 tablet 0    [DISCONTINUED] lisinopril (PRINIVIL,ZESTRIL) 20 MG tablet Take 20 mg by mouth once daily.      [DISCONTINUED] ondansetron (ZOFRAN-ODT) 8 MG TbDL Take 1 tablet (8 mg total) by mouth 3 (three) times daily as needed. 15 tablet 0    [DISCONTINUED] prazosin (MINIPRESS) 1 MG Cap Take 1 mg by mouth 2 (two) times daily.      [DISCONTINUED] traZODone (DESYREL) 50 MG tablet Take 50 mg by  "mouth every evening.     [2]   Social History  Tobacco Use    Smoking status: Every Day     Current packs/day: 0.50     Types: Cigarettes   Vaping Use    Vaping status: Every Day   Substance Use Topics    Alcohol use: Yes     Comment: drinks beer once per week    Drug use: Yes     Types: "Crack" cocaine     "

## 2025-06-25 NOTE — ED NOTES
Pt transported to MRI via [facility] with transporter. Spoke with pt's sister, Dorothea, provided update on pt.

## 2025-06-25 NOTE — ED NOTES
Improvement in symptoms. Urticaria to left forearm markedly reduced, erythema resolved, pruritus resolved. No new areas of involvement noted.

## 2025-06-25 NOTE — CONSULTS
[facility] - Neuro Critical Care  Vascular Neurology  [facility]  Consult Note    Inpatient consult to Vascular Neurology  Consult performed by: Otilia Neely PA-C  Consult ordered by: Steven Contreras MD        Assessment/Plan:       * Cerebrovascular accident (CVA) due to occlusion of right middle cerebral artery  Cadence Vasques is a 49 y.o. female with PMH of HTN, tobacco use (0.5 PPD), substance abuse that presents as a transfer from [facility] for higher level of care of acute R MCA stroke.  She initially presented to [facility] ED with LSW, LFD, and dysarthria. LKW >24hrs prior, as family reported seeing her 3-4 days prior with facial droop and dysarthria. Telestroke NIHSS 12. CTH with hypoattenuation c/w recent infarcts, CTA revealed R M1 occlusion. Determined not a candidate for lytics or IR due to LKW being >24hrs prior. She was transferred to [facility] for further eval and stroke workup. MRI brain shows multifocal areas of moderate sized acute infarct involving the R MCA territory with localized mass effect but no MLS. She was evaluated by gen neuro at [facility], given risk for development of edema that would require neurosurgical intervention decision made to transfer patient to [facility] for admission to [facility] for higher level of care and close neuro monitoring.     On admit to [facility] neuro exam significant for LUE/LLE hemiplegia, severe LFD, mod-severe dysarthria, R gaze preference but is able to cross midline/track to L. NIHSS 13. NSGY consulted for hemicrani watch.      Antithrombotics for secondary stroke prevention: Antiplatelets: Aspirin: 81 mg daily  Clopidogrel: 75 mg daily    Statins for secondary stroke prevention and hyperlipidemia, if present: Statins: Atorvastatin- 40 mg daily    Aggressive risk factor modification: Smoking, Diet, Exercise     Rehab efforts: The patient has been evaluated by a stroke team provider and the therapy needs have been fully considered based  off the presenting complaints and exam findings. The following therapy evaluations are needed: PT evaluate and treat, OT evaluate and treat, SLP evaluate and treat    Diagnostics ordered/pending: CT scan of head without contrast to asses brain parenchyma    VTE prophylaxis: Heparin 5000 units SQ every 8 hours  Mechanical prophylaxis: Place SCDs    BP parameters: Infarct: No intervention, SBP <220      Substance use  -Stroke risk factor  -UDS + cocaine  - on cessation    Tobacco dependency  -Stroke risk factor  - on cessation  -Consider nicotine patch PRN  -Referral for smoking cessation at discharge if patient agreeable        STROKE DOCUMENTATION          NIH Scale:  1a. Level of Consciousness: 0-->Alert, keenly responsive  1b. LOC Questions: 0-->Answers both questions correctly  1c. LOC Commands: 0-->Performs both tasks correctly  2. Best Gaze: 1-->Partial gaze palsy, gaze is abnormal in one or both eyes, but forced deviation or total gaze paresis is not present  3. Visual: 0-->No visual loss  4. Facial Palsy: 2-->Partial paralysis (total or near-total paralysis of lower face)  5a. Motor Arm, Left: 3-->No effort against gravity, limb falls  5b. Motor Arm, Right: 0-->No drift, limb holds 90 (or 45) degrees for full 10 secs  6a. Motor Leg, Left: 4-->No movement  6b. Motor Leg, Right: 0-->No drift, leg holds 30 degree position for full 5 secs  7. Limb Ataxia: 0-->Absent  8. Sensory: 0-->Normal, no sensory loss  9. Best Language: 1-->Mild-to-moderate aphasia, some obvious loss of fluency or facility of comprehension, without significant limitation on ideas expressed or form of expression. Reduction of speech and/or comprehension, however, makes conversation. . . (see row details)  10. Dysarthria: 2-->Severe dysarthria, patients speech is so slurred as to be unintelligible in the absence of or out of proportion to any dysphasia, or is mute/anarthric  11. Extinction and Inattention (formerly Neglect):  0-->No abnormality  Total (NIH Stroke Scale): 13    Modified Silas Score: 0  [place] Coma Scale:    ABCD2 Score:    [id number] Score:   HAS -BLED Score:   ICH Score:   Hunt & Jose Classification:       Thrombolysis Candidate? No, Out of window - Symptom onset > 4.5 hours    Delays to Thrombolysis?  Not Applicable    Interventional Revascularization Candidate?   Is the patient eligible for mechanical endovascular reperfusion (DEREK)?  No; Large core infarct on imaging     Delays to Thrombectomy? Not Applicable    Hemorrhagic change of an Ischemic Stroke: Does this patient have an ischemic stroke with hemorrhagic changes? No     Subjective:     History of Present Illness:  Cadence Vasques is a 49 y.o. female with PMH of HTN, tobacco use (0.5 PPD), substance abuse that presents as a transfer from [facility] for higher level of care of acute R MCA stroke.  She initially presented to [facility] ED with LSW, LFD, and dysarthria. LKW >24hrs prior, as family reported seeing her 3-4 days prior with facial droop and dysarthria. Telestroke NIHSS 12. CTH with hypoattenuation c/w recent infarcts, CTA revealed R M1 occlusion. Determined not a candidate for lytics or IR due to LKW being >24hrs prior. She was transferred to [facility] for further eval and stroke workup. MRI brain shows multifocal areas of moderate sized acute infarct involving the R MCA territory with localized mass effect but no MLS. She was evaluated by gen neuro at [facility], given risk for development of edema that would require neurosurgical intervention decision made to transfer patient to [facility] for admission to [facility] for higher level of care and close neuro monitoring. On admit to [facility] neuro exam significant for LUE/LLE hemiplegia, severe LFD, mod-severe dysarthria, R gaze preference but is able to cross midline/track to L. NIHSS 13. NSGY consulted for hemicrani watch.          Past Medical History:   Diagnosis Date    Anemia     Fibroid (bleeding)  (uterine)     GERD (gastroesophageal reflux disease)     Hypertension      Past Surgical History:   Procedure Laterality Date     SECTION       Social History[1]  Review of patient's allergies indicates:   Allergen Reactions    Opioids - morphine analogues Hives and Itching     Reaction noted after re-assessment of getting Morphine.        Medications: I have reviewed the current medication administration record.    Prescriptions Prior to Admission[2]    Review of Systems   Constitutional:  Negative for fatigue.   HENT:  Negative for drooling and trouble swallowing.    Eyes:  Negative for visual disturbance.   Respiratory:  Negative for choking.    Cardiovascular:  Negative for palpitations.   Gastrointestinal:  Negative for nausea and vomiting.   Musculoskeletal:  Negative for neck stiffness.   Skin:  Negative for color change.   Neurological:  Positive for facial asymmetry, speech difficulty and weakness. Negative for numbness and headaches.   Psychiatric/Behavioral:  Negative for confusion.    All other systems reviewed and are negative.    Objective:     Vital Signs (Most Recent):  Temp: 98.6 °F (37 °C) (25 1502)  Pulse: 62 (25 1502)  Resp: 18 (25 1502)  BP: 139/80 (25 1502)  SpO2: 98 % (25 1502)    Vital Signs Range (Last 24H):  Temp:  [97.7 °F (36.5 °C)-99.7 °F (37.6 °C)]   Pulse:  [56-92]   Resp:  [10-21]   BP: (109-181)/()   SpO2:  [93 %-100 %]        Physical Exam  Vitals and nursing note reviewed.   Constitutional:       General: She is not in acute distress.  HENT:      Head: Normocephalic.      Nose: Nose normal.   Eyes:      Conjunctiva/sclera: Conjunctivae normal.      Comments: R gaze preference but tracks to L   Cardiovascular:      Rate and Rhythm: Normal rate.   Pulmonary:      Effort: Pulmonary effort is normal. No respiratory distress.   Skin:     General: Skin is warm.   Neurological:      Mental Status: She is alert.      Cranial Nerves: Dysarthria  and facial asymmetry present.      Motor: Weakness present.      Comments: See below for neuro exam   Psychiatric:         Behavior: Behavior normal. Behavior is cooperative.              Neurological Exam:   LOC: alert  Attention Span: Good   Language: Expressive aphasia  Articulation: Dysarthria  Orientation: Person, Place, Time   Visual Fields: Full  EOM (CN III, IV, VI): Gaze preference  right  Facial Movement (CN VII): Lower facial weakness on the Left  Motor:      ---LUE Plegia 0/5     ---LLE  Plegia 0/5     ---RUE Normal 5/5     ---RLE Normal 5/5  Sensation: intact to light touch        Laboratory:  CMP:   Recent Labs   Lab 06/24/25  1855   CALCIUM 9.2   ALBUMIN 4.3   PROT 8.1      K 3.5   CO2 26      BUN 10   CREATININE 0.4*   ALKPHOS 83   ALT 11   AST 19   BILITOT 0.6     CBC:   Recent Labs   Lab 06/24/25  1857   WBC 8.95   RBC 4.64   HGB 14.4   HCT 42.2      MCV 91   MCH 31.0   MCHC 34.1     Lipid Panel:   Recent Labs   Lab 06/24/25  1855   CHOL 236*   LDLCALC 152.4   HDL 74   TRIG 48     Coagulation:   Recent Labs   Lab 06/25/25  0544   INR 1.1   APTT 26.1     Hgb A1C:   Recent Labs   Lab 06/25/25  0544   HGBA1C 5.5     TSH:   Recent Labs   Lab 06/24/25  1855   TSH 0.347*       Diagnostic Results:      Brain imaging:  MRI brain without contrast 6/25/2025  Impression:  1. Motion compromised, prematurely terminated study.  2. Corresponding to findings on prior CT/CTA imaging of 06/24/2025, multifocal areas of expansile restricted diffusion T2 weighted signal abnormality are noted throughout the right MCA territory in keeping with acute to early subacute infarct.  No definite macroscopic hemorrhage or significant mass effect at the present time.  3. Diminutive flow related signal of right MCA branches within the sylvian fissure, in keeping with distal M1 occlusion demonstrated on prior CTA performed 06/24/2025.  Serpiginous FLAIR hyperintense signal in this region in keeping with slow  "and/or disorganized flow due to the proximal occlusion.    CTH without contrast 6/25/2025  Impression:  Findings concerning for multifocal recent infarcts throughout the right cerebral hemisphere, likely acute/subacute.  No parenchymal or extra-axial hemorrhage.  Further evaluation with MRI brain and/or CTA head and neck can be obtained as warranted.      Vessel Imaging:  CTA head and neck 6/25/2025  Impression:  1. No acute intracranial abnormality.  2. No large vessel occlusion.  3.  Partially imaged 3.8 cm circumscribed soft tissue density/ mass extending from the superior mediastinum to the right suprahilar region of unclear etiology.      Cardiac Evaluation:   TTE 6/25/2025    Left Ventricle: The left ventricle is normal in size. Normal wall thickness. There is normal systolic function. Quantitated ejection fraction is 62%. There is normal diastolic function.    Right Ventricle: The right ventricle is normal in size measuring 3.4 cm. Wall thickness is normal. Systolic function is normal.    Aortic Valve: There is mild aortic regurgitation.    Mitral Valve: Mildly thickened anterior leaflet of undetermined significance.  Consider KAREN for better evaluation of the mitral valve in the setting of stroke    Pulmonary Artery: The estimated pulmonary artery systolic pressure is 17 mmHg.    IVC/SVC: Normal venous pressure at 3 mmHg.    Study is negative for shunt.          Otilia Neely PA-C  Comprehensive Stroke Center  Department of Vascular Neurology   [facility] - Neuro Critical Care        [1]   Social History  Tobacco Use    Smoking status: Every Day     Current packs/day: 0.50     Types: Cigarettes   Vaping Use    Vaping status: Every Day   Substance Use Topics    Alcohol use: Yes     Comment: drinks beer once per week    Drug use: Yes     Types: "Crack" cocaine   [2]   Medications Prior to Admission   Medication Sig Dispense Refill Last Dose/Taking    [START ON 6/26/2025] aspirin (ECOTRIN) 81 MG EC tablet Take 1 " tablet (81 mg total) by mouth once daily. 360 tablet 0     [START ON 6/26/2025] atorvastatin (LIPITOR) 40 MG tablet Take 1 tablet (40 mg total) by mouth once daily. 90 tablet 3     [START ON 6/26/2025] clopidogreL (PLAVIX) 75 mg tablet Take 1 tablet (75 mg total) by mouth once daily. for 21 days 21 tablet 0     FERROUS FUMARATE (IRON ORAL) Take by mouth.       omeprazole (PRILOSEC) 20 MG capsule Take 20 mg by mouth once daily.

## 2025-06-25 NOTE — PLAN OF CARE
Problem: SLP  Goal: SLP Goal  Description: Short Term Goals:  1. Pt will participate in a clinical swallow eval to determine least restrictive diet. - Ongoing  2. Pt will safely tolerate >75% of FULL LIQUID diet with no overt s/s of aspiration. - Ongoing  3. Pt will participate in informal speech-language and cognitive eval to r/o related deficits. - Ongoing    Outcome: Ongoing    Pt agreeable and participated in swallow eval this date. Pt is safe to initiate FULL LIQUID diet following standards swallow precautions:   -meds- whole 1 at a time  -1:1 assist with all po intake  -SMALL bites/sips  -alternate bites/sips  -1 bite/sip at a time  -HOB to 90 degrees during all po intake  -remain upright for 30 min s/p meals

## 2025-06-26 PROBLEM — E87.6 HYPOKALEMIA: Status: ACTIVE | Noted: 2025-06-26

## 2025-06-26 PROBLEM — G47.33 OSA (OBSTRUCTIVE SLEEP APNEA): Status: ACTIVE | Noted: 2025-06-26

## 2025-06-26 LAB
ABSOLUTE EOSINOPHIL (OHS): 0.05 K/UL
ABSOLUTE MONOCYTE (OHS): 0.54 K/UL (ref 0.3–1)
ABSOLUTE NEUTROPHIL COUNT (OHS): 3.25 K/UL (ref 1.8–7.7)
ALBUMIN SERPL BCP-MCNC: 3.8 G/DL (ref 3.5–5.2)
ALP SERPL-CCNC: 75 UNIT/L (ref 40–150)
ALT SERPL W/O P-5'-P-CCNC: 8 UNIT/L (ref 10–44)
ANION GAP (OHS): 12 MMOL/L (ref 8–16)
AST SERPL-CCNC: 13 UNIT/L (ref 11–45)
BASOPHILS # BLD AUTO: 0.04 K/UL
BASOPHILS NFR BLD AUTO: 0.5 %
BILIRUB SERPL-MCNC: 0.6 MG/DL (ref 0.1–1)
BUN SERPL-MCNC: 10 MG/DL (ref 6–20)
CALCIUM SERPL-MCNC: 9.3 MG/DL (ref 8.7–10.5)
CHLORIDE SERPL-SCNC: 104 MMOL/L (ref 95–110)
CO2 SERPL-SCNC: 23 MMOL/L (ref 23–29)
CREAT SERPL-MCNC: 0.6 MG/DL (ref 0.5–1.4)
ERYTHROCYTE [DISTWIDTH] IN BLOOD BY AUTOMATED COUNT: 13.4 % (ref 11.5–14.5)
GFR SERPLBLD CREATININE-BSD FMLA CKD-EPI: >60 ML/MIN/1.73/M2
GLUCOSE SERPL-MCNC: 86 MG/DL (ref 70–110)
HCT VFR BLD AUTO: 43 % (ref 37–48.5)
HGB BLD-MCNC: 14.3 GM/DL (ref 12–16)
IMM GRANULOCYTES # BLD AUTO: 0.02 K/UL (ref 0–0.04)
IMM GRANULOCYTES NFR BLD AUTO: 0.3 % (ref 0–0.5)
LYMPHOCYTES # BLD AUTO: 3.72 K/UL (ref 1–4.8)
MAGNESIUM SERPL-MCNC: 1.9 MG/DL (ref 1.6–2.6)
MCH RBC QN AUTO: 30.5 PG (ref 27–31)
MCHC RBC AUTO-ENTMCNC: 33.3 G/DL (ref 32–36)
MCV RBC AUTO: 92 FL (ref 82–98)
NUCLEATED RBC (/100WBC) (OHS): 0 /100 WBC
PHOSPHATE SERPL-MCNC: 4.5 MG/DL (ref 2.7–4.5)
PLATELET # BLD AUTO: 292 K/UL (ref 150–450)
PMV BLD AUTO: 10.6 FL (ref 9.2–12.9)
POCT GLUCOSE: 84 MG/DL (ref 70–110)
POCT GLUCOSE: 90 MG/DL (ref 70–110)
POTASSIUM SERPL-SCNC: 3.2 MMOL/L (ref 3.5–5.1)
PROT SERPL-MCNC: 7.7 GM/DL (ref 6–8.4)
RBC # BLD AUTO: 4.69 M/UL (ref 4–5.4)
RELATIVE EOSINOPHIL (OHS): 0.7 %
RELATIVE LYMPHOCYTE (OHS): 48.8 % (ref 18–48)
RELATIVE MONOCYTE (OHS): 7.1 % (ref 4–15)
RELATIVE NEUTROPHIL (OHS): 42.6 % (ref 38–73)
SODIUM SERPL-SCNC: 139 MMOL/L (ref 136–145)
WBC # BLD AUTO: 7.62 K/UL (ref 3.9–12.7)

## 2025-06-26 PROCEDURE — 20600001 HC STEP DOWN PRIVATE ROOM

## 2025-06-26 PROCEDURE — 63600175 PHARM REV CODE 636 W HCPCS

## 2025-06-26 PROCEDURE — 97112 NEUROMUSCULAR REEDUCATION: CPT

## 2025-06-26 PROCEDURE — 92610 EVALUATE SWALLOWING FUNCTION: CPT

## 2025-06-26 PROCEDURE — 25000003 PHARM REV CODE 250

## 2025-06-26 PROCEDURE — 85025 COMPLETE CBC W/AUTO DIFF WBC: CPT

## 2025-06-26 PROCEDURE — 25000003 PHARM REV CODE 250: Performed by: PHYSICIAN ASSISTANT

## 2025-06-26 PROCEDURE — 84100 ASSAY OF PHOSPHORUS: CPT

## 2025-06-26 PROCEDURE — 99291 CRITICAL CARE FIRST HOUR: CPT | Mod: ,,, | Performed by: PSYCHIATRY & NEUROLOGY

## 2025-06-26 PROCEDURE — 97530 THERAPEUTIC ACTIVITIES: CPT

## 2025-06-26 PROCEDURE — 97163 PT EVAL HIGH COMPLEX 45 MIN: CPT

## 2025-06-26 PROCEDURE — 83735 ASSAY OF MAGNESIUM: CPT

## 2025-06-26 PROCEDURE — 92523 SPEECH SOUND LANG COMPREHEN: CPT

## 2025-06-26 PROCEDURE — 94761 N-INVAS EAR/PLS OXIMETRY MLT: CPT

## 2025-06-26 PROCEDURE — 99900035 HC TECH TIME PER 15 MIN (STAT)

## 2025-06-26 PROCEDURE — 94799 UNLISTED PULMONARY SVC/PX: CPT

## 2025-06-26 PROCEDURE — 99233 SBSQ HOSP IP/OBS HIGH 50: CPT | Mod: ,,, | Performed by: PSYCHIATRY & NEUROLOGY

## 2025-06-26 PROCEDURE — 82310 ASSAY OF CALCIUM: CPT

## 2025-06-26 PROCEDURE — 97167 OT EVAL HIGH COMPLEX 60 MIN: CPT

## 2025-06-26 RX ORDER — METHOCARBAMOL 500 MG/1
500 TABLET, FILM COATED ORAL EVERY 6 HOURS
Status: DISCONTINUED | OUTPATIENT
Start: 2025-06-26 | End: 2025-07-01 | Stop reason: HOSPADM

## 2025-06-26 RX ORDER — LIDOCAINE 50 MG/G
1 PATCH TOPICAL
Status: DISCONTINUED | OUTPATIENT
Start: 2025-06-26 | End: 2025-07-01 | Stop reason: HOSPADM

## 2025-06-26 RX ORDER — SODIUM,POTASSIUM PHOSPHATES 280-250MG
2 POWDER IN PACKET (EA) ORAL
Status: DISCONTINUED | OUTPATIENT
Start: 2025-06-26 | End: 2025-06-26

## 2025-06-26 RX ORDER — POTASSIUM CHLORIDE 7.45 MG/ML
40 INJECTION INTRAVENOUS
Status: DISCONTINUED | OUTPATIENT
Start: 2025-06-26 | End: 2025-06-26

## 2025-06-26 RX ORDER — POTASSIUM CHLORIDE 7.45 MG/ML
60 INJECTION INTRAVENOUS
Status: DISCONTINUED | OUTPATIENT
Start: 2025-06-26 | End: 2025-06-26

## 2025-06-26 RX ORDER — CALCIUM GLUCONATE 20 MG/ML
3 INJECTION, SOLUTION INTRAVENOUS
Status: DISCONTINUED | OUTPATIENT
Start: 2025-06-26 | End: 2025-06-26

## 2025-06-26 RX ORDER — LANOLIN ALCOHOL/MO/W.PET/CERES
800 CREAM (GRAM) TOPICAL
Status: DISCONTINUED | OUTPATIENT
Start: 2025-06-26 | End: 2025-06-26

## 2025-06-26 RX ORDER — CALCIUM GLUCONATE 20 MG/ML
2 INJECTION, SOLUTION INTRAVENOUS
Status: DISCONTINUED | OUTPATIENT
Start: 2025-06-26 | End: 2025-06-26

## 2025-06-26 RX ORDER — POTASSIUM CHLORIDE 7.45 MG/ML
80 INJECTION INTRAVENOUS
Status: DISCONTINUED | OUTPATIENT
Start: 2025-06-26 | End: 2025-06-26

## 2025-06-26 RX ORDER — CALCIUM GLUCONATE 20 MG/ML
1 INJECTION, SOLUTION INTRAVENOUS
Status: DISCONTINUED | OUTPATIENT
Start: 2025-06-26 | End: 2025-06-26

## 2025-06-26 RX ORDER — MAGNESIUM SULFATE HEPTAHYDRATE 40 MG/ML
2 INJECTION, SOLUTION INTRAVENOUS
Status: DISCONTINUED | OUTPATIENT
Start: 2025-06-26 | End: 2025-06-26

## 2025-06-26 RX ADMIN — METHOCARBAMOL 500 MG: 500 TABLET ORAL at 12:06

## 2025-06-26 RX ADMIN — LIDOCAINE 1 PATCH: 50 PATCH CUTANEOUS at 05:06

## 2025-06-26 RX ADMIN — SENNOSIDES AND DOCUSATE SODIUM 1 TABLET: 50; 8.6 TABLET ORAL at 08:06

## 2025-06-26 RX ADMIN — METHOCARBAMOL 500 MG: 500 TABLET ORAL at 05:06

## 2025-06-26 RX ADMIN — ENOXAPARIN SODIUM 40 MG: 40 INJECTION SUBCUTANEOUS at 05:06

## 2025-06-26 RX ADMIN — ACETAMINOPHEN 650 MG: 325 TABLET ORAL at 08:06

## 2025-06-26 RX ADMIN — POTASSIUM CHLORIDE 60 MEQ: 7.46 INJECTION, SOLUTION INTRAVENOUS at 05:06

## 2025-06-26 RX ADMIN — ASPIRIN 81 MG: 81 TABLET, COATED ORAL at 08:06

## 2025-06-26 RX ADMIN — METHOCARBAMOL 500 MG: 500 TABLET ORAL at 11:06

## 2025-06-26 RX ADMIN — ACETAMINOPHEN 650 MG: 325 TABLET ORAL at 01:06

## 2025-06-26 RX ADMIN — ATORVASTATIN CALCIUM 40 MG: 40 TABLET, FILM COATED ORAL at 08:06

## 2025-06-26 RX ADMIN — POTASSIUM BICARBONATE 35 MEQ: 391 TABLET, EFFERVESCENT ORAL at 12:06

## 2025-06-26 RX ADMIN — CLOPIDOGREL 75 MG: 75 TABLET ORAL at 08:06

## 2025-06-26 RX ADMIN — ONDANSETRON 4 MG: 2 INJECTION INTRAMUSCULAR; INTRAVENOUS at 05:06

## 2025-06-26 NOTE — SUBJECTIVE & OBJECTIVE
Interval History:  Please see hospital course above for full details    Review of Systems   Constitutional:  Positive for activity change and fatigue.   Respiratory: Negative.     Cardiovascular: Negative.    Gastrointestinal: Negative.    Neurological:  Positive for facial asymmetry, speech difficulty, weakness and headaches. Negative for seizures and numbness.   Psychiatric/Behavioral:  Positive for decreased concentration.        Objective:     Vitals:  Temp: 98.4 °F (36.9 °C)  Pulse: 69  Rhythm: normal sinus rhythm  BP: (!) 140/80  MAP (mmHg): 102  Resp: 18  SpO2: 98 %    Temp  Min: 98.4 °F (36.9 °C)  Max: 98.8 °F (37.1 °C)  Pulse  Min: 58  Max: 86  BP  Min: 120/79  Max: 202/93  MAP (mmHg)  Min: 88  Max: 134  Resp  Min: 12  Max: 34  SpO2  Min: 93 %  Max: 100 %    06/25 0701 - 06/26 0700  In: 350 [P.O.:350]  Out: 575 [Urine:575]   Unmeasured Output  Unmeasured Urine Occurrence: 1        Physical Exam  General Appearance: Middle aged woman resting in bed, appears older than stated age. Not in acute hemodynamic distress  Mental Status Exam: awakens to loud verbal stimuli, does require some repeated stimulation to remain awake. Slow to respond to questions, however is oriented x4, able to follow commands on R.    Cranial Nerves: R gaze preference, able to cross midline, unable to bury to L. Inconsistent BTT b/l. PERRL. Mild dysarthria w/ L facial droop.  Motor: no drift in the RUE/RLE. LUE 1/5 distally, 0/5 proximally; LLE w/d to noxious  Sensory: Decreased sensation to noxious over L hemibody, ? L hemivisual neglect  Coordination: Unable to assess  Vascular: S1/S2 of normal intensity, no S3/S4 appreciated, no murmurs appreciated  Lungs: CTA bilaterally without wheezing  Abdomen: Soft, non-distended, non-tender, BS +         Medications:  Continuous Scheduledaspirin, 81 mg, Daily  atorvastatin, 40 mg, Daily  clopidogreL, 75 mg, Daily  enoxparin, 40 mg, Daily  LIDOcaine, 1 patch, Q24H  methocarbamoL, 500 mg,  Q6H  senna-docusate, 1 tablet, BID    PRNacetaminophen, 650 mg, Q6H PRN  bisacodyL, 10 mg, Daily PRN  labetalol, 10 mg, Q6H PRN  magnesium oxide, 800 mg, PRN  magnesium oxide, 800 mg, PRN  ondansetron, 4 mg, Q8H PRN  potassium bicarbonate, 35 mEq, PRN  potassium bicarbonate, 50 mEq, PRN  potassium bicarbonate, 60 mEq, PRN  potassium, sodium phosphates, 2 packet, PRN  potassium, sodium phosphates, 2 packet, PRN  potassium, sodium phosphates, 2 packet, PRN  sodium chloride 0.9%, 10 mL, PRN      Today I personally reviewed pertinent imaging, cardiology results, laboratory results, notably: MRI brain w/ scattered R MCA territory infarcts, no MLS, stable on repeat imaging overnight. TTE w/ EF 62%, mild thickening of mitral valve, no LA dilation. CBC unremarkable. CMP w/ K 3.2 (repleted), otherwise unremarkable.     Diet  Diet Full Liquid  Diet Full Liquid

## 2025-06-26 NOTE — NURSING
RN spoke to Samantha QUESADA regarding patients K+ replacement as patient is not tolerating IV K+ and began moaning loudly in room within 30 seconds of bag refill. Pt received 20mEq so far, with 40mEq left to replace. Okay given to stop infusion and to discuss replacement in rounds.     PA notified earlier via secure chat when inquiring about said K+ replacement that patient has been complaining of severe back pain since their CT this AM. Pt has called RN approx 3-4 times in the first hour of shift asking for pain control and position changes. Pt adjusted in bed, pillows on both sides, boosted to the top with bed alarm on as pt is very fidgety.

## 2025-06-26 NOTE — SUBJECTIVE & OBJECTIVE
Neurologic Chief Complaint: R MCA stroke    Subjective:     Interval History: Patient is seen for follow-up neurological assessment and treatment recommendations:     HPI, Past Medical, Family, and Social History remains the same as documented in the initial encounter.     Review of Systems   Constitutional:  Negative for chills and fever.   Respiratory:  Negative for shortness of breath.    Cardiovascular:  Negative for chest pain.   Neurological:  Positive for facial asymmetry and weakness.   Psychiatric/Behavioral:  Negative for confusion.      Scheduled Meds:   aspirin  81 mg Oral Daily    atorvastatin  40 mg Oral Daily    clopidogreL  75 mg Oral Daily    enoxparin  40 mg Subcutaneous Daily    LIDOcaine  1 patch Transdermal Q24H    methocarbamoL  500 mg Oral Q6H    senna-docusate  1 tablet Oral BID     Continuous Infusions:  PRN Meds:  Current Facility-Administered Medications:     acetaminophen, 650 mg, Oral, Q6H PRN    bisacodyL, 10 mg, Rectal, Daily PRN    calcium gluconate IVPB, 1 g, Intravenous, PRN    calcium gluconate IVPB, 2 g, Intravenous, PRN    calcium gluconate IVPB, 3 g, Intravenous, PRN    labetalol, 10 mg, Intravenous, Q6H PRN    magnesium sulfate 2 g IVPB, 2 g, Intravenous, PRN    magnesium sulfate 2 g IVPB, 2 g, Intravenous, PRN    ondansetron, 4 mg, Intravenous, Q8H PRN    potassium chloride, 40 mEq, Intravenous, PRN **AND** potassium chloride, 60 mEq, Intravenous, PRN **AND** potassium chloride, 80 mEq, Intravenous, PRN    sodium chloride 0.9%, 10 mL, Intravenous, PRN    sodium phosphate 15 mmol in D5W 250 mL IVPB, 15 mmol, Intravenous, PRN    sodium phosphate 20.1 mmol in D5W 250 mL IVPB, 20.1 mmol, Intravenous, PRN    sodium phosphate 30 mmol in D5W 250 mL IVPB, 30 mmol, Intravenous, PRN    Objective:     Vital Signs (Most Recent):  Temp: 98.6 °F (37 °C) (06/26/25 0701)  Pulse: 65 (06/26/25 0915)  Resp: (!) 31 (06/26/25 0901)  BP: (!) 143/85 (06/26/25 0901)  SpO2: 97 % (06/26/25 0901)  BP  Location: Left arm    Vital Signs Range (Last 24H):  Temp:  [98.4 °F (36.9 °C)-98.8 °F (37.1 °C)]   Pulse:  [56-86]   Resp:  [12-32]   BP: (120-202)/()   SpO2:  [93 %-100 %]   BP Location: Left arm       Physical Exam  Cardiovascular:      Rate and Rhythm: Normal rate.      Pulses: Normal pulses.   Pulmonary:      Breath sounds: Normal breath sounds.   Neurological:      Mental Status: She is alert and oriented to person, place, and time.      Motor: Weakness present.              Neurological Exam:   LOC: alert  Language: No aphasia  Articulation: Dysarthria  EOM (CN III, IV, VI): Full/intact  Facial Sensation (CN V): Normal  Facial Movement (CN VII): Lower facial weakness on the Left  Motor: Arm left  Paresis: 1/5  Leg left  Paresis: 1/5  Arm right  Normal 5/5  Leg right Normal 5/5  Sensation: Intact to light touch, temperature and vibration    Laboratory:  CMP:   Recent Labs   Lab 06/26/25  0139   CALCIUM 9.3   ALBUMIN 3.8   PROT 7.7      K 3.2*   CO2 23      BUN 10   CREATININE 0.6   ALKPHOS 75   ALT 8*   AST 13   BILITOT 0.6     CBC:   Recent Labs   Lab 06/26/25  0139   WBC 7.62   RBC 4.69   HGB 14.3   HCT 43.0      MCV 92   MCH 30.5   MCHC 33.3       Diagnostic Results   CT HEAD W/O CONTRAST 6/26/25  Impression:     Similar distribution of recent right MCA distribution infarcts without evidence of hemorrhagic conversion or midline shift.     No new major vascular distribution infarct.     8 mm partially calcified lesion with adjacent cyst formation in the region of the right choroid fissure/medial temporal lobe.  This is of unclear etiology and although other lesions are not excluded, a cavernous malformation to be considered.  MR imaging with contrast when the patient is stable would be helpful for further characterization.    Brain Imaging   MRI BRAIN W/O CONTRAST 6/25/25  Impression:     1. Motion compromised, prematurely terminated study.  2. Corresponding to findings on prior  CT/CTA imaging of 06/24/2025, multifocal areas of expansile restricted diffusion T2 weighted signal abnormality are noted throughout the right MCA territory in keeping with acute to early subacute infarct.  No definite macroscopic hemorrhage or significant mass effect at the present time.  3. Diminutive flow related signal of right MCA branches within the sylvian fissure, in keeping with distal M1 occlusion demonstrated on prior CTA performed 06/24/2025.  Serpiginous FLAIR hyperintense signal in this region in keeping with slow and/or disorganized flow due to the proximal occlusion.    Vessel Imaging   CTA HEAD AND NECK 6/24/24  Impression:     1. No acute intracranial abnormality.  2. No large vessel occlusion.  3.  Partially imaged 3.8 cm circumscribed soft tissue density/ mass extending from the superior mediastinum to the right suprahilar region of unclear etiology.     % stenosis derived by comparing the narrowest segment with the distal luminal diameter as related to the reported measure of arterial narrowing.       Cardiac Imaging   ECHO 6/25/25    Left Ventricle: The left ventricle is normal in size. Normal wall thickness. There is normal systolic function. Quantitated ejection fraction is 62%. There is normal diastolic function.    Right Ventricle: The right ventricle is normal in size measuring 3.4 cm. Wall thickness is normal. Systolic function is normal.    Aortic Valve: There is mild aortic regurgitation.    Mitral Valve: Mildly thickened anterior leaflet of undetermined significance.  Consider KAREN for better evaluation of the mitral valve in the setting of stroke    Pulmonary Artery: The estimated pulmonary artery systolic pressure is 17 mmHg.    IVC/SVC: Normal venous pressure at 3 mmHg.    Study is negative for shunt.

## 2025-06-26 NOTE — ASSESSMENT & PLAN NOTE
Cadence Vasques is a 49 y.o. female transferred to [facility] for R M1 occlusion, presenting with L sided plegia, L FD, Right gaze preference and dysarthria. Family at bedside reports symptoms began Thursday of last week. They noticed the patient was slurring her words and stumbling. Patient is currently on aspirin/plavix. Urine tox positive for cocaine.     CTH 6/24: No midline shift. There is mild effacement of the right lateral ventricle frontal horn.  CTA 6/24: No LVO.  MRI Brain 6/25: R MCA infarct.  CTH 6/26 no shift , minimal stroke burden    - Admitted to [facility].  - q1h neurochecks.  - No acute neurosurgical intervention.   - Do not anticipate need for any neurosurgical intervention  - Medical management per [facility].    D/w Dr. Haywood.

## 2025-06-26 NOTE — PROGRESS NOTES
[facility] - Neuro Critical Care  Neurocritical Care  Progress Note    Admit Date: 6/25/2025  Service Date: 06/26/2025  Length of Stay: 1    Subjective:     Chief Complaint: Cerebrovascular accident (CVA) due to occlusion of right middle cerebral artery    History of Present Illness: Cadence Vasques is a 49 y.o. female w/ PMH of HTN, GERD, anemia, fibroids, tobacco use, and cocaine use who presented to [facility] ED for LSW, LS numbness, LFD, and dysarthria that began at least 4-5 days prior to her presentation. Patient initially transferred to another outside hospital and was then transferred to [facility] due to concern for worsening exam findings. NIH was reported as 12 on her initial presentation. CTA showed R distal M1 occlusion. MRI showed multifocal areas of diffusion restriction throughout the R MCA distribution. Patient transferred to [facility] NCC for HLOC.    Hospital Course: 06/26/2025 Confirmed initial onset of symptoms on 6/19, worsened overnight on 6/23-6/24. Imaging reviewed, <1/2 of MCA territory, low risk for malignant MCA syndrome. No worsening of symptoms w/ mobilization w/ therapies. Denies IVDU. Stable for transfer to floor.     Interval History:  Please see hospital course above for full details    Review of Systems   Constitutional:  Positive for activity change and fatigue.   Respiratory: Negative.     Cardiovascular: Negative.    Gastrointestinal: Negative.    Neurological:  Positive for facial asymmetry, speech difficulty, weakness and headaches. Negative for seizures and numbness.   Psychiatric/Behavioral:  Positive for decreased concentration.        Objective:     Vitals:  Temp: 98.4 °F (36.9 °C)  Pulse: 69  Rhythm: normal sinus rhythm  BP: (!) 140/80  MAP (mmHg): 102  Resp: 18  SpO2: 98 %    Temp  Min: 98.4 °F (36.9 °C)  Max: 98.8 °F (37.1 °C)  Pulse  Min: 58  Max: 86  BP  Min: 120/79  Max: 202/93  MAP (mmHg)  Min: 88  Max: 134  Resp  Min: 12  Max: 34  SpO2  Min: 93 %  Max: 100 %    06/25  0701 - 06/26 0700  In: 350 [P.O.:350]  Out: 575 [Urine:575]   Unmeasured Output  Unmeasured Urine Occurrence: 1        Physical Exam  General Appearance: Middle aged woman resting in bed, appears older than stated age. Not in acute hemodynamic distress  Mental Status Exam: awakens to loud verbal stimuli, does require some repeated stimulation to remain awake. Slow to respond to questions, however is oriented x4, able to follow commands on R.    Cranial Nerves: R gaze preference, able to cross midline, unable to bury to L. Inconsistent BTT b/l. PERRL. Mild dysarthria w/ L facial droop.  Motor: no drift in the RUE/RLE. LUE 1/5 distally, 0/5 proximally; LLE w/d to noxious  Sensory: Decreased sensation to noxious over L hemibody, ? L hemivisual neglect  Coordination: Unable to assess  Vascular: S1/S2 of normal intensity, no S3/S4 appreciated, no murmurs appreciated  Lungs: CTA bilaterally without wheezing  Abdomen: Soft, non-distended, non-tender, BS +         Medications:  Continuous Scheduledaspirin, 81 mg, Daily  atorvastatin, 40 mg, Daily  clopidogreL, 75 mg, Daily  enoxparin, 40 mg, Daily  LIDOcaine, 1 patch, Q24H  methocarbamoL, 500 mg, Q6H  senna-docusate, 1 tablet, BID    PRNacetaminophen, 650 mg, Q6H PRN  bisacodyL, 10 mg, Daily PRN  labetalol, 10 mg, Q6H PRN  magnesium oxide, 800 mg, PRN  magnesium oxide, 800 mg, PRN  ondansetron, 4 mg, Q8H PRN  potassium bicarbonate, 35 mEq, PRN  potassium bicarbonate, 50 mEq, PRN  potassium bicarbonate, 60 mEq, PRN  potassium, sodium phosphates, 2 packet, PRN  potassium, sodium phosphates, 2 packet, PRN  potassium, sodium phosphates, 2 packet, PRN  sodium chloride 0.9%, 10 mL, PRN      Today I personally reviewed pertinent imaging, cardiology results, laboratory results, notably: MRI brain w/ scattered R MCA territory infarcts, no MLS, stable on repeat imaging overnight. TTE w/ EF 62%, mild thickening of mitral valve, no LA dilation. CBC unremarkable. CMP w/ K 3.2  (repleted), otherwise unremarkable.     Diet  Diet Full Liquid  Diet Full Liquid      Assessment/Plan:     Neuro  * Cerebrovascular accident (CVA) due to occlusion of right middle cerebral artery  Cadence Vasques is a 49 y.o. female w/ PMH of HTN, GERD, anemia, fibroids, tobacco use, and cocaine use who presented to OSH for L sided weakness and dysarthria, initial onset of symptoms on 6/19 w/ possible worsening on 6/23-6/24. Transferred to [facility] for higher level of care.     Exam c/w R MCA syndrome. Patient OOW for any intervention. ESUS stroke.     Admit to NCC  NSGY consulted given patient's age and size of infarct; no acute intervention at this time per their recommendations  Stroke burden <1/2 of territory, does not appear sufficient in size to be concerned for malignant MCA syndrome   No worsening of symptoms with normotension and working w/ therapies, does not appear perfusion dependent  TTE w/ thickened mitral valve -> pt denies IVDU, however given ESUS stroke may benefit from KAREN, defer to vascular neurology  DAPT/statin for secondary stroke prevention   CBC, CMP, mag, and phos daily  SBP goal < 220  PRN labetalol and hydralazine  Full liquid diet per SLP recs  Vascular Neurology consulted   on tobacco and cocaine cessation  SCD; lovenox  PT/OT/SLP -> acute rehab    Stable for transfer to floor for ongoing management. No clear ICU needs at this time.         Psychiatric  Cocaine use  Stroke risk factor. UDS + on admission    - on cessation  -Suspect cocaine w/d may be contributing to some of patient's fatigue/lethargy     Cardiac/Vascular  Essential hypertension  Stroke risk factor  -Gradual reduction of SBP goal given she is approximately 5 days post-stroke -> normotensive overnight off medications    Renal/  Hypokalemia  Replete per protocol     Other  LUCINDA (obstructive sleep apnea)  Noted to desat when sleeping  Suspect undiagnosed LUCINDA    - Will need outpt sleep study      Tobacco dependency  Stroke risk factor.  -encourage cessation            The patient is being Prophylaxed for:  Venous Thromboembolism with: Mechanical or Chemical  Stress Ulcer with: None  Ventilator Pneumonia with: not applicable    Activity Orders            Diet Full Liquid: Full Liquid starting at 06/25 1611    Turn patient starting at 06/25 1600    Elevate HOB starting at 06/25 1434          Full Code    Level III visit    Ruma Carrero MD  Neurocritical Care  [facility] - Neuro Critical Care

## 2025-06-26 NOTE — ASSESSMENT & PLAN NOTE
Cadence Vasques is a 49 y.o. female w/ PMH of HTN, GERD, anemia, fibroids, tobacco use, and cocaine use who presented to OSH for L sided weakness and dysarthria, initial onset of symptoms on 6/19 w/ possible worsening on 6/23-6/24. Transferred to [facility] for higher level of care.     Exam c/w R MCA syndrome. Patient OOW for any intervention. ESUS stroke.     Admit to NCC  NSGY consulted given patient's age and size of infarct; no acute intervention at this time per their recommendations  Stroke burden <1/2 of territory, does not appear sufficient in size to be concerned for malignant MCA syndrome   No worsening of symptoms with normotension and working w/ therapies, does not appear perfusion dependent  TTE w/ thickened mitral valve -> pt denies IVDU, however given ESUS stroke may benefit from KAREN, defer to vascular neurology  DAPT/statin for secondary stroke prevention   CBC, CMP, mag, and phos daily  SBP goal < 220  PRN labetalol and hydralazine  Full liquid diet per SLP recs  Vascular Neurology consulted   on tobacco and cocaine cessation  SCD; lovenox  PT/OT/SLP -> acute rehab    Stable for transfer to floor for ongoing management. No clear ICU needs at this time.

## 2025-06-26 NOTE — PROGRESS NOTES
"[facility] - Neuro Critical Care  Neurosurgery  Progress Note    Subjective:     History of Present Illness: Cadence Vasques is a 49 y.o. female transferred to [facility] for R M1 occlusion, presenting with L sided plegia, L FD, Right gaze preference and dysarthria. Family at bedside reports symptoms began Thursday of last week. They noticed the patient was slurring her words and stumbling. Patient is currently on aspirin/plavix. Urine tox positive for cocaine.     Post-Op Info:  * No surgery found *       : CTH no shift, low stroke burden. Neuro stable    Prescriptions Prior to Admission[1]    Review of patient's allergies indicates:   Allergen Reactions    Opioids - morphine analogues Hives and Itching     Reaction noted after re-assessment of getting Morphine.        Past Medical History:   Diagnosis Date    Anemia     Fibroid (bleeding) (uterine)     GERD (gastroesophageal reflux disease)     Hypertension      Past Surgical History:   Procedure Laterality Date     SECTION       Family History    None       Tobacco Use    Smoking status: Every Day     Current packs/day: 0.50     Types: Cigarettes    Smokeless tobacco: Not on file   Vaping Use    Vaping status: Every Day   Substance and Sexual Activity    Alcohol use: Yes     Comment: drinks beer once per week    Drug use: Yes     Types: "Crack" cocaine    Sexual activity: Not on file     Review of Systems   Unable to perform ROS: Acuity of condition     Objective:     Weight: 60.5 kg (133 lb 6.1 oz)  Body mass index is 22.89 kg/m².  Vital Signs (Most Recent):  Temp: 98.8 °F (37.1 °C) (25 1501)  Pulse: 85 (25 1709)  Resp: 19 (25 1709)  BP: 118/67 (25 1701)  SpO2: 98 % (25 1709) Vital Signs (24h Range):  Temp:  [98.4 °F (36.9 °C)-98.8 °F (37.1 °C)] 98.8 °F (37.1 °C)  Pulse:  [60-86] 85  Resp:  [12-34] 19  SpO2:  [93 %-100 %] 98 %  BP: (118-202)/() 118/67     Date 25 0700 - 25 0659   Shift 0357-3738 " 3717-0059 2613-9568 24 Hour Total   INTAKE   P.O. 400   400   IV Piggyback 206.9   206.9   Shift Total(mL/kg) 606.9(10)   606.9(10)   OUTPUT   Urine(mL/kg/hr) 550(1.1)   550   Shift Total(mL/kg) 550(9.1)   550(9.1)   Weight (kg) 60.5 60.5 60.5 60.5           Neurosurgery Physical Exam  General: Well developed, well nourished.  Head: Normocephalic.  GCS: Motor: 6/Verbal: 5/Eyes: 4 GCS Total: 15  Eyes: Pupils equal, round, reactive to light with accomodation, EOMI. Right gaze preference.  CN: Left sided facial droop.  Motor Strength: Moves right side full strength and to command. Left sided plegia.   Language/speech: Dysarthria.  Pulses: 2+ and symmetric radial and dorsalis pedis.  Skin: Skin is warm, dry and intact.    Significant Labs:  Recent Labs   Lab 06/24/25 1855 06/26/25  0139    86    139   K 3.5 3.2*    104   CO2 26 23   BUN 10 10   CREATININE 0.4* 0.6   CALCIUM 9.2 9.3   MG  --  1.9     Recent Labs   Lab 06/24/25 1857 06/26/25  0139   WBC 8.95 7.62   HGB 14.4 14.3   HCT 42.2 43.0    292     Recent Labs   Lab 06/24/25 1855 06/25/25  0544   INR 1.1 1.1   APTT  --  26.1     Microbiology Results (last 7 days)       ** No results found for the last 168 hours. **          All pertinent labs from the last 24 hours have been reviewed.    Significant Diagnostics:  I have reviewed and interpreted all pertinent imaging results/findings within the past 24 hours.      Assessment/Plan:     * Cerebrovascular accident (CVA) due to occlusion of right middle cerebral artery  Cadence Vasques is a 49 y.o. female transferred to [facility] for R M1 occlusion, presenting with L sided plegia, L FD, Right gaze preference and dysarthria. Family at bedside reports symptoms began Thursday of last week. They noticed the patient was slurring her words and stumbling. Patient is currently on aspirin/plavix. Urine tox positive for cocaine.     CTH 6/24: No midline shift. There is mild effacement of the  right lateral ventricle frontal horn.  CTA 6/24: No LVO.  MRI Brain 6/25: R MCA infarct.  CTH 6/26 no shift , minimal stroke burden    - Admitted to [facility].  - q1h neurochecks.  - No acute neurosurgical intervention.   - Do not anticipate need for any neurosurgical intervention  - Medical management per [facility].    D/w Dr. Haywood.            Juancarlos Abdullahi MD  Neurosurgery  [facility] - Neuro Critical Care       [1]   Medications Prior to Admission   Medication Sig Dispense Refill Last Dose/Taking    aspirin (ECOTRIN) 81 MG EC tablet Take 1 tablet (81 mg total) by mouth once daily. 360 tablet 0     atorvastatin (LIPITOR) 40 MG tablet Take 1 tablet (40 mg total) by mouth once daily. 90 tablet 3     clopidogreL (PLAVIX) 75 mg tablet Take 1 tablet (75 mg total) by mouth once daily. for 21 days 21 tablet 0     FERROUS FUMARATE (IRON ORAL) Take by mouth.       omeprazole (PRILOSEC) 20 MG capsule Take 20 mg by mouth once daily.

## 2025-06-26 NOTE — PROGRESS NOTES
[facility] - Neuro Critical Care  Vascular Neurology  Comprehensive Stroke Center  Progress Note    Assessment/Plan:     * Cerebrovascular accident (CVA) due to occlusion of right middle cerebral artery  Cadence Vasques is a 49 y.o. female with PMH of HTN, tobacco use (0.5 PPD), substance abuse that presents as a transfer from [facility] for higher level of care of acute R MCA stroke.  She initially presented to [facility] ED with LSW, LFD, and dysarthria. LKW >24hrs prior, as family reported seeing her 3-4 days prior with facial droop and dysarthria. Telestroke NIHSS 12. CTH with hypoattenuation c/w recent infarcts, CTA revealed R M1 occlusion. Determined not a candidate for lytics or IR due to LKW being >24hrs prior. She was transferred to [facility] for further eval and stroke workup. MRI brain shows multifocal areas of moderate sized acute infarct involving the R MCA territory with localized mass effect but no MLS. She was evaluated by gen neuro at [facility], given risk for development of edema that would require neurosurgical intervention decision made to transfer patient to [facility] for admission to [facility] for higher level of care and close neuro monitoring.   On admit to [facility] neuro exam significant for LUE/LLE hemiplegia, severe LFD, mod-severe dysarthria, R gaze preference but is able to cross midline/track to L. NIHSS 13. NSGY consulted for hemicrani watch.    06/26/2025  JACOB. Neuro exam stable. Continue to work with PT/OT/SLP.       Antithrombotics for secondary stroke prevention: Antiplatelets: Aspirin: 81 mg daily  Clopidogrel: 75 mg daily    Statins for secondary stroke prevention and hyperlipidemia, if present: Statins: Atorvastatin- 40 mg daily    Aggressive risk factor modification: Smoking, Diet, Exercise     Rehab efforts: The patient has been evaluated by a stroke team provider and the therapy needs have been fully considered based off the presenting complaints and exam findings. The following  therapy evaluations are needed: PT evaluate and treat, OT evaluate and treat, SLP evaluate and treat    Diagnostics ordered/pending: None     VTE prophylaxis: Heparin 5000 units SQ every 8 hours  Mechanical prophylaxis: Place SCDs    BP parameters: Infarct: No intervention, SBP <220      Cocaine use  -Stroke risk factor  -UDS + cocaine  - on cessation    Tobacco dependency  -Stroke risk factor  - on cessation  -Consider nicotine patch PRN  -Referral for smoking cessation at discharge if patient agreeable         06/26/2025  NAEON. CTH with similar distribution of recent right MCA distribution infarcts without evidence of hemorrhagic conversion or midline shift; No new major vascular distribution infarct. Neuro exam stable. Diet advanced to soft/bite sized. Continue to work with PT/OT.    STROKE DOCUMENTATION        NIH Scale:  1a. Level of Consciousness: 1-->Not alert, but arousable by minor stimulation to obey, answer, or respond  1b. LOC Questions: 0-->Answers both questions correctly  1c. LOC Commands: 0-->Performs both tasks correctly  2. Best Gaze: 1-->Partial gaze palsy, gaze is abnormal in one or both eyes, but forced deviation or total gaze paresis is not present  3. Visual: 0-->No visual loss  4. Facial Palsy: 2-->Partial paralysis (total or near-total paralysis of lower face)  5a. Motor Arm, Left: 3-->No effort against gravity, limb falls  5b. Motor Arm, Right: 0-->No drift, limb holds 90 (or 45) degrees for full 10 secs  6a. Motor Leg, Left: 3-->No effort against gravity, leg falls to bed immediately  6b. Motor Leg, Right: 0-->No drift, leg holds 30 degree position for full 5 secs  7. Limb Ataxia: 0-->Absent  8. Sensory: 0-->Normal, no sensory loss  9. Best Language: 0-->No aphasia, normal  10. Dysarthria: 1-->Mild-to-moderate dysarthria, patient slurs at least some words and, at worst, can be understood with some difficulty  11. Extinction and Inattention (formerly Neglect): 0-->No  abnormality  Total (NIH Stroke Scale): 11       Modified Paulding Score: 0  [place] Coma Scale:    ABCD2 Score:    [id number] Score:   HAS -BLED Score:   ICH Score:   Hunt & Jose Classification:      Hemorrhagic change of an Ischemic Stroke: Does this patient have an ischemic stroke with hemorrhagic changes? No     Neurologic Chief Complaint: R MCA stroke    Subjective:     Interval History: Patient is seen for follow-up neurological assessment and treatment recommendations:     HPI, Past Medical, Family, and Social History remains the same as documented in the initial encounter.     Review of Systems   Constitutional:  Negative for chills and fever.   Respiratory:  Negative for shortness of breath.    Cardiovascular:  Negative for chest pain.   Neurological:  Positive for facial asymmetry and weakness.   Psychiatric/Behavioral:  Negative for confusion.      Scheduled Meds:   aspirin  81 mg Oral Daily    atorvastatin  40 mg Oral Daily    clopidogreL  75 mg Oral Daily    enoxparin  40 mg Subcutaneous Daily    LIDOcaine  1 patch Transdermal Q24H    methocarbamoL  500 mg Oral Q6H    senna-docusate  1 tablet Oral BID     Continuous Infusions:  PRN Meds:  Current Facility-Administered Medications:     acetaminophen, 650 mg, Oral, Q6H PRN    bisacodyL, 10 mg, Rectal, Daily PRN    calcium gluconate IVPB, 1 g, Intravenous, PRN    calcium gluconate IVPB, 2 g, Intravenous, PRN    calcium gluconate IVPB, 3 g, Intravenous, PRN    labetalol, 10 mg, Intravenous, Q6H PRN    magnesium sulfate 2 g IVPB, 2 g, Intravenous, PRN    magnesium sulfate 2 g IVPB, 2 g, Intravenous, PRN    ondansetron, 4 mg, Intravenous, Q8H PRN    potassium chloride, 40 mEq, Intravenous, PRN **AND** potassium chloride, 60 mEq, Intravenous, PRN **AND** potassium chloride, 80 mEq, Intravenous, PRN    sodium chloride 0.9%, 10 mL, Intravenous, PRN    sodium phosphate 15 mmol in D5W 250 mL IVPB, 15 mmol, Intravenous, PRN    sodium phosphate 20.1 mmol in D5W 250  mL IVPB, 20.1 mmol, Intravenous, PRN    sodium phosphate 30 mmol in D5W 250 mL IVPB, 30 mmol, Intravenous, PRN    Objective:     Vital Signs (Most Recent):  Temp: 98.6 °F (37 °C) (06/26/25 0701)  Pulse: 65 (06/26/25 0915)  Resp: (!) 31 (06/26/25 0901)  BP: (!) 143/85 (06/26/25 0901)  SpO2: 97 % (06/26/25 0901)  BP Location: Left arm    Vital Signs Range (Last 24H):  Temp:  [98.4 °F (36.9 °C)-98.8 °F (37.1 °C)]   Pulse:  [56-86]   Resp:  [12-32]   BP: (120-202)/()   SpO2:  [93 %-100 %]   BP Location: Left arm       Physical Exam  Cardiovascular:      Rate and Rhythm: Normal rate.      Pulses: Normal pulses.   Pulmonary:      Breath sounds: Normal breath sounds.   Neurological:      Mental Status: She is alert and oriented to person, place, and time.      Motor: Weakness present.              Neurological Exam:   LOC: alert  Language: No aphasia  Articulation: Dysarthria  EOM (CN III, IV, VI): Full/intact  Facial Sensation (CN V): Normal  Facial Movement (CN VII): Lower facial weakness on the Left  Motor: Arm left  Paresis: 1/5  Leg left  Paresis: 1/5  Arm right  Normal 5/5  Leg right Normal 5/5  Sensation: Intact to light touch, temperature and vibration    Laboratory:  CMP:   Recent Labs   Lab 06/26/25  0139   CALCIUM 9.3   ALBUMIN 3.8   PROT 7.7      K 3.2*   CO2 23      BUN 10   CREATININE 0.6   ALKPHOS 75   ALT 8*   AST 13   BILITOT 0.6     CBC:   Recent Labs   Lab 06/26/25  0139   WBC 7.62   RBC 4.69   HGB 14.3   HCT 43.0      MCV 92   MCH 30.5   MCHC 33.3       Diagnostic Results   CT HEAD W/O CONTRAST 6/26/25  Impression:     Similar distribution of recent right MCA distribution infarcts without evidence of hemorrhagic conversion or midline shift.     No new major vascular distribution infarct.     8 mm partially calcified lesion with adjacent cyst formation in the region of the right choroid fissure/medial temporal lobe.  This is of unclear etiology and although other lesions are not  excluded, a cavernous malformation to be considered.  MR imaging with contrast when the patient is stable would be helpful for further characterization.    Brain Imaging   MRI BRAIN W/O CONTRAST 6/25/25  Impression:     1. Motion compromised, prematurely terminated study.  2. Corresponding to findings on prior CT/CTA imaging of 06/24/2025, multifocal areas of expansile restricted diffusion T2 weighted signal abnormality are noted throughout the right MCA territory in keeping with acute to early subacute infarct.  No definite macroscopic hemorrhage or significant mass effect at the present time.  3. Diminutive flow related signal of right MCA branches within the sylvian fissure, in keeping with distal M1 occlusion demonstrated on prior CTA performed 06/24/2025.  Serpiginous FLAIR hyperintense signal in this region in keeping with slow and/or disorganized flow due to the proximal occlusion.    Vessel Imaging   CTA HEAD AND NECK 6/24/24  Impression:     1. No acute intracranial abnormality.  2. No large vessel occlusion.  3.  Partially imaged 3.8 cm circumscribed soft tissue density/ mass extending from the superior mediastinum to the right suprahilar region of unclear etiology.     % stenosis derived by comparing the narrowest segment with the distal luminal diameter as related to the reported measure of arterial narrowing.       Cardiac Imaging   ECHO 6/25/25    Left Ventricle: The left ventricle is normal in size. Normal wall thickness. There is normal systolic function. Quantitated ejection fraction is 62%. There is normal diastolic function.    Right Ventricle: The right ventricle is normal in size measuring 3.4 cm. Wall thickness is normal. Systolic function is normal.    Aortic Valve: There is mild aortic regurgitation.    Mitral Valve: Mildly thickened anterior leaflet of undetermined significance.  Consider KAREN for better evaluation of the mitral valve in the setting of stroke    Pulmonary Artery: The  estimated pulmonary artery systolic pressure is 17 mmHg.    IVC/SVC: Normal venous pressure at 3 mmHg.    Study is negative for shunt.       Cata Linder MD  [facility]  Department of Vascular Neurology   [facility] - Neuro Critical Care

## 2025-06-26 NOTE — SUBJECTIVE & OBJECTIVE
": [facility] no shift, low stroke burden. Neuro stable    Prescriptions Prior to Admission[1]    Review of patient's allergies indicates:   Allergen Reactions    Opioids - morphine analogues Hives and Itching     Reaction noted after re-assessment of getting Morphine.        Past Medical History:   Diagnosis Date    Anemia     Fibroid (bleeding) (uterine)     GERD (gastroesophageal reflux disease)     Hypertension      Past Surgical History:   Procedure Laterality Date     SECTION       Family History    None       Tobacco Use    Smoking status: Every Day     Current packs/day: 0.50     Types: Cigarettes    Smokeless tobacco: Not on file   Vaping Use    Vaping status: Every Day   Substance and Sexual Activity    Alcohol use: Yes     Comment: drinks beer once per week    Drug use: Yes     Types: "Crack" cocaine    Sexual activity: Not on file     Review of Systems   Unable to perform ROS: Acuity of condition     Objective:     Weight: 60.5 kg (133 lb 6.1 oz)  Body mass index is 22.89 kg/m².  Vital Signs (Most Recent):  Temp: 98.8 °F (37.1 °C) (25 1501)  Pulse: 85 (25 1709)  Resp: 19 (25 1709)  BP: 118/67 (25 1701)  SpO2: 98 % (25 1709) Vital Signs (24h Range):  Temp:  [98.4 °F (36.9 °C)-98.8 °F (37.1 °C)] 98.8 °F (37.1 °C)  Pulse:  [60-86] 85  Resp:  [12-34] 19  SpO2:  [93 %-100 %] 98 %  BP: (118-202)/() 118/67     Date 25 0700 - 25 0659   Shift 7434-8310 1350-6183 2411-8309 24 Hour Total   INTAKE   P.O. 400   400   IV Piggyback 206.9   206.9   Shift Total(mL/kg) 606.9(10)   606.9(10)   OUTPUT   Urine(mL/kg/hr) 550(1.1)   550   Shift Total(mL/kg) 550(9.1)   550(9.1)   Weight (kg) 60.5 60.5 60.5 60.5           Neurosurgery Physical Exam  General: Well developed, well nourished.  Head: Normocephalic.  GCS: Motor: 6/Verbal: 5/Eyes: 4 GCS Total: 15  Eyes: Pupils equal, round, reactive to light with accomodation, EOMI. Right gaze preference.  CN: Left sided facial " droop.  Motor Strength: Moves right side full strength and to command. Left sided plegia.   Language/speech: Dysarthria.  Pulses: 2+ and symmetric radial and dorsalis pedis.  Skin: Skin is warm, dry and intact.    Significant Labs:  Recent Labs   Lab 06/24/25 1855 06/26/25  0139    86    139   K 3.5 3.2*    104   CO2 26 23   BUN 10 10   CREATININE 0.4* 0.6   CALCIUM 9.2 9.3   MG  --  1.9     Recent Labs   Lab 06/24/25 1857 06/26/25  0139   WBC 8.95 7.62   HGB 14.4 14.3   HCT 42.2 43.0    292     Recent Labs   Lab 06/24/25 1855 06/25/25  0544   INR 1.1 1.1   APTT  --  26.1     Microbiology Results (last 7 days)       ** No results found for the last 168 hours. **          All pertinent labs from the last 24 hours have been reviewed.    Significant Diagnostics:  I have reviewed and interpreted all pertinent imaging results/findings within the past 24 hours.           [1]   Medications Prior to Admission   Medication Sig Dispense Refill Last Dose/Taking    aspirin (ECOTRIN) 81 MG EC tablet Take 1 tablet (81 mg total) by mouth once daily. 360 tablet 0     atorvastatin (LIPITOR) 40 MG tablet Take 1 tablet (40 mg total) by mouth once daily. 90 tablet 3     clopidogreL (PLAVIX) 75 mg tablet Take 1 tablet (75 mg total) by mouth once daily. for 21 days 21 tablet 0     FERROUS FUMARATE (IRON ORAL) Take by mouth.       omeprazole (PRILOSEC) 20 MG capsule Take 20 mg by mouth once daily.

## 2025-06-26 NOTE — ASSESSMENT & PLAN NOTE
Stroke risk factor  -Gradual reduction of SBP goal given she is approximately 5 days post-stroke -> normotensive overnight off medications

## 2025-06-26 NOTE — NURSING
Pt arrived back to room following CT        Pt was escorted by RN on cardiac monitoring and ambu bag.        Patient placed back on bedside monitor with alarms audible, bed in low position with bed alarm on, call light within reach. SOCORRO.

## 2025-06-26 NOTE — PT/OT/SLP EVAL
"Occupational Therapy   Evaluation    Name: Cadence Vasques  MRN: [id number]  Admitting Diagnosis: Cerebrovascular accident (CVA) due to occlusion of right middle cerebral artery  Recent Surgery: * No surgery found *      Recommendations:     Discharge Recommendations: High Intensity Therapy  Discharge Equipment Recommendations:  bath bench, shower chair  Barriers to discharge:  None    Assessment:     Cadence Vasques is a 49 y.o. female with a medical diagnosis of Cerebrovascular accident (CVA) due to occlusion of right middle cerebral artery.  She presents with performance deficits affecting function: impaired self care skills, impaired functional mobility, impaired endurance, impaired balance, decreased upper extremity function, decreased lower extremity function, impaired coordination, impaired fine motor.      Rehab Prognosis: Good; patient would benefit from acute skilled OT services to address these deficits and reach maximum level of function.       Plan:     Patient to be seen 4 x/week to address the above listed problems via neuromuscular re-education, therapeutic exercises, therapeutic activities, self-care/home management, cognitive retraining.  Will provide treatment separately from PT to provide increased opportunity for out of bed activity, increase endurance and overall strength.  Plan of Care Expires: 07/24/25  Plan of Care Reviewed with: patient    Subjective     Patient:  "I had a stroke; my cousin told me."  "I can't move my left side."    Occupational Profile:  Patient resides in [place] with her cousin in one story home with 2 steps to enter, no rail.  Patient is right handed.  PTA patient independent with ADLs, not driving.  Works:  cleaning houses.  Hobbies: cooking, cleaning.  Currently owns no DME.    Pain/Comfort:  Pain Rating 1: 6/10  Location 1: back  Pain Addressed 1: Reposition, Nurse notified  Pain Rating Post-Intervention 1: 3/10    Patients cultural, spiritual, Gnosticism " conflicts given the current situation: no    Objective:     Communicated with: Nurse prior to session.  Patient found supine with blood pressure cuff, telemetry, pulse ox (continuous) upon OT entry to room.    General Precautions: Standard, aspiration, fall  Orthopedic Precautions: N/A  Braces: N/A  Respiratory Status: Room air    Occupational Performance:    Bed Mobility:    Patient completed Rolling/Turning to Left with  stand by assistance  Patient completed Rolling/Turning to Right with moderate assistance  Patient completed Supine to Sit with maximal assistance  Patient completed Sit to Supine with maximal assistance    Functional Mobility/Transfers:  Patient completed Sit <> Stand Transfer with maximal assistance  with  no assistive device   Max assist with side step along EOB    Activities of Daily Living:  Grooming: max assistance while seated EOB with left UE in weight bearing  Upper Body Dressing: maximal assistance while seated EOB  Lower Body Dressing: total assistance while seated EOB    Cognitive/Visual Perceptual:  Cognitive/Psychosocial Skills:     -       Oriented to: Person, Place, Time, and Situation   -       Follows Commands/attention:Follows one-step commands  -       Communication: dysarthria    Physical Exam:  Postural examination/scapula alignment:    -       Rounded shoulders  Upper Extremity Range of Motion:     -       Right Upper Extremity: WNL  -       Left Upper Extremity: PROM WNL  Upper Extremity Strength: -       Right Upper Extremity: WNL  -       Left Upper Extremity: 0/5    AMPAC 6 Click ADL:  AMPAC Total Score: 10    Treatment & Education:  Patient education provided for stroke warning signs, prevention guidelines and personal risk factors.  Patient education provided on role of OT and need for continued OT upon discharge.  Patient education provided on hemiplegic dressing technique, left UE weight bearing / positioning, postural control, and transfers.    Patient alert and  oriented x 3; able to follow 4/4 one step commands.  Patient attentive and interactive throughout the session.  Patient able to identify 5/5 body parts.  Able to name 5/5 objects.  Able to sequence 7/7 days of the week and 12/12 months of the year.  Addressed oral motor exercises while seated EOB with SBA, with left UE in weight bearing.  Drooling noted left side.  PROM performed bilateral left UE/LEs one set x 10 rep in all planes of motion with stretches provided at end range; sustained stretch provided for shoulder flexion, external rotation, hip/knee flexion and ankle dorsiflexion; increased flexor tone noted.  Assistance and facilitation provided for upward rotation of the scapula during shoulder flexion and abduction to promote orientation of glenoid fossa of scapula to humeral head for prevention of post-stroke hemiplegic pain.  Provided multi-sensory stimulation to prevent sensory deprivation and improve clinical outcomes.  Positioning provided for midline orientation with bilateral UEs elevated and heels lifted off mattress; positioning provided to prevent flexor synergy pattern in UE (internal rotation and adduction) to decrease risk of post-stroke hemiplegic pain.   Gentle cervical rotation provided.   Family not present during the session.  Continued education, patient/ family training recommended.   White board updated in patient's room.  OT asked if there were any other questions; patient had no further questions.  Session was conducted separately from PT session to give more opportunities to mobilize throughout the day.     Patient left supine with all lines intact, call button in reach, and bed alarm on    GOALS:   Multidisciplinary Problems       Occupational Therapy Goals          Problem: Occupational Therapy    Goal Priority Disciplines Outcome Interventions   Occupational Therapy Goal     OT, PT/OT Progressing    Description: Goals set 6/26 with an expiration date, 7/24:  Patient will increase  functional independence with ADLs by performing:    Patient will demonstrate rolling to the right with min assist.  Not met   Patient will demonstrate rolling to the left with modified independence.   Not met  Patient will demonstrate supine -sit with min assist.   Not met  Patient will demonstrate stand pivot transfers with min assist.   Not met  Patient will demonstrate grooming while standing with min assist.   Not met  Patient will demonstrate upper body dressing with min assist while seated EOB.   Not met  Patient will demonstrate lower body dressing with mod assist while seated EOB.   Not met  Patient will demonstrate toileting with mod assist.   Not met  Patient will demonstrate bathing while seated EOB with mod assist.   Not met  Patient's family / caregiver will demonstrate independence and safety with assisting patient with self-care skills and functional mobility.     Not met  Patient's family / caregiver will demonstrate independence with providing ROM and changes in bed positioning.   Not met  Patient and/or patient's family will verbalize understanding of stroke prevention guidelines, personal risk factors and stroke warning signs via teachback method.  Not met                              History:     Past Medical History:   Diagnosis Date    Anemia     Fibroid (bleeding) (uterine)     GERD (gastroesophageal reflux disease)     Hypertension          Past Surgical History:   Procedure Laterality Date     SECTION         Time Tracking:     OT Date of Treatment: 25  OT Start Time: 405  OT Stop Time: 448  OT Total Time (min): 43 min    Billable Minutes:Evaluation 10  Therapeutic Activity 10  Neuromuscular Re-education 23  2025

## 2025-06-26 NOTE — PLAN OF CARE
[facility] - Neuro Critical Care  Initial Discharge Assessment       Primary Care Provider: Joyce, Primary Doctor    Admission Diagnosis: Ischemic stroke [I63.9]  Acute ischemic stroke [I63.9]    Admission Date: 6/25/2025  Expected Discharge Date:     Transition of Care Barriers: Substance Abuse    Payor: DEA DIAZ [facility] [employer] / Plan: DEA NICHOLAS [employer] / Product Type: Commercial /     Extended Emergency Contact Information  Primary Emergency Contact: Dorothea Hernandez   [facility]  Home Phone: [phone]  Relation: Sister  Secondary Emergency Contact: eliza franks  Mobile Phone: [phone]  Relation: Brother  Preferred language: English   needed? No    Discharge Plan A: Other  Discharge Plan B: Home with family    No Pharmacies Listed    Initial Assessment (most recent)       Adult Discharge Assessment - 06/26/25 1037          Discharge Assessment    Assessment Type Discharge Planning Assessment     Confirmed/corrected address, phone number and insurance Yes   address is incorrect , sister does not know current address    Source of Information family     Communicated LETICIA with patient/caregiver Yes     People in Home sibling(s)     Name(s) of People in Home Dorothea Hernandez  [phone]     Do you expect to return to your current living situation? Yes     Do you have help at home or someone to help you manage your care at home? No     Prior to hospitilization cognitive status: Unable to Assess     Current cognitive status: Unable to Assess     Walking or Climbing Stairs Difficulty no     Dressing/Bathing Difficulty no     Home Layout Able to live on 1st floor     Equipment Currently Used at Home none     Readmission within 30 days? Yes     Patient currently being followed by outpatient case management? No     Do you currently have service(s) that help you manage your care at home? No     Do you take prescription medications? No     Do you have prescription coverage?  Yes     Do you have any problems affording any of your prescribed medications? --   unsure    Is the patient taking medications as prescribed? no     If no, which medications is patient not taking? sister reports that she does not take meds but knows that she has high blood pressure     Who is going to help you get home at discharge? Dorothea Hernandez      How do you get to doctors appointments? public transportation     Are you on dialysis? No     Do you take coumadin? No     Discharge Plan A Other     Discharge Plan B Home with family     DME Needed Upon Discharge  none     Discharge Plan discussed with: Sibling     Name(s) and Number(s) Dorothea Hernandez      Transition of Care Barriers Substance Abuse        Physical Activity    On average, how many days per week do you engage in moderate to strenuous exercise (like a brisk walk)? 0 days     On average, how many minutes do you engage in exercise at this level? 0 min        Financial Resource Strain    How hard is it for you to pay for the very basics like food, housing, medical care, and heating? Hard        Housing Stability    In the last 12 months, was there a time when you were not able to pay the mortgage or rent on time? Patient unable to answer     At any time in the past 12 months, were you homeless or living in a shelter (including now)? No        Transportation Needs    In the past 12 months, has lack of transportation kept you from medical appointments or from getting medications? Patient unable to answer     In the past 12 months, has lack of transportation kept you from meetings, work, or from getting things needed for daily living? Patient unable to answer        Food Insecurity    Within the past 12 months, you worried that your food would run out before you got the money to buy more. Patient unable to answer     Within the past 12 months, the food you bought just didn't last and you didn't have money to get more. Patient unable to  answer        Stress    Do you feel stress - tense, restless, nervous, or anxious, or unable to sleep at night because your mind is troubled all the time - these days? Patient unable to answer        Social Isolation    How often do you feel lonely or isolated from those around you?  Patient unable to answer        Alcohol Use    Q1: How often do you have a drink containing alcohol? Monthly or less     Q2: How many drinks containing alcohol do you have on a typical day when you are drinking? 1 or 2     Q3: How often do you have six or more drinks on one occasion? Never        Utilities    In the past 12 months has the electric, gas, oil, or water company threatened to shut off services in your home? Patient unable to answer        Health Literacy    How often do you need to have someone help you when you read instructions, pamphlets, or other written material from your doctor or pharmacy? Patient unable to respond                   The SW  spoke with pt sister Dorothea Hernandez   via phone.  Pt sister was unable to provide all information for pt due to pt history of substance abuse. The SW placed name and contact information on the blackboard in the patient's room. Use preferred pharmacy / bedside delivery for any necessary medications at the time of discharge. The patient is independent with all ADLs. The patient is not on Dialysis or Coumadin. The Patient does not use DME or home oxygen, The patient's sister will provide assistance to the patient upon discharge. The patient's sister  will provide transportation upon discharge. SW will continue to follow for course of hospitalization.  A discharge planning booklet was left at bedside.        Discharge Plan A and Plan B have been determined by review of patient's clinical status, future medical and therapeutic needs, and coverage/benefits for post-acute care in coordination with multidisciplinary team members.    Wendie Rg MSW, LCSW [facility]  Case Management Dept.

## 2025-06-26 NOTE — PLAN OF CARE
Problem: SLP  Goal: SLP Goal  Description: Speech Language Pathology Goals  Goals expected to be met by 7/10  1. Pt will participate in ongoing assessment of swallow.   2. Pt will implement simple speech strategies at sentence level with min cues.  3. Pt will complete higher level reasoning/deduction tasks with 90% accy.   4. Pt will complete simple L attention tasks with 90% accy with max cues.   5. Pt will complete assessment of reading, writing, visual spatial skills to determine need for tx.     Outcome: Progressing     Evaluation completed. Rec level 6 soft and bite sized diet with thin liquids with strict aspiration precautions.

## 2025-06-26 NOTE — HOSPITAL COURSE
06/26/2025 Confirmed initial onset of symptoms on 6/19, worsened overnight on 6/23-6/24. Imaging reviewed, <1/2 of MCA territory, low risk for malignant MCA syndrome. No worsening of symptoms w/ mobilization w/ therapies. Denies IVDU. Stable for transfer to floor.   06/27/2025 NAEON, more aware and alert this AM. Transfer to floor pending bed availability.

## 2025-06-26 NOTE — HOSPITAL COURSE
06/26/2025  NAEON. CTH with similar distribution of recent right MCA distribution infarcts without evidence of hemorrhagic conversion or midline shift; No new major vascular distribution infarct. Neuro exam stable. Diet advanced to soft/bite sized. Continue to work with PT/OT.  06/27/2025 NAEON. Drowsy. Awakens to voice. LUE plegic. LLE strength improving. PT/OT recs HIT. Stepped down to NPU. Awaiting transfer.   06/28/2025 NAEON. Improved alertness. K 3.4. Replacement ordered. Dispo planning ongoing. PT/OT recs HIT.   06/29/2025 NAEON. Awakens to voice. Neuro exam stable. Dispo planning ongoing.  06/30/2025 NAEON. Neuro exam stable. Patient drowsy, reports she did not sleep last night secondary to a noisy neighbor. Case management to begin authorization process for rehab.   07/30/2025 NAEON. Pt approved for rehab. Plan to transfer today.

## 2025-06-26 NOTE — PLAN OF CARE
[facility] Care Plan  POC reviewed with Cadence Marlyn Layo and family at 1400. Patient and Family verbalized understanding. Questions and concerns addressed. No acute events today. Pt progressing toward goals. Will continue to monitor. See below and flowsheets for full assessment and VS info.     -diet advanced to soft and bite sized from full liquid  -transfer orders placed          Is this a stroke patient? yes- Stroke booklet reviewed with family and is at bedside.   Care Plan Individualization:     Neuro:  [place] Coma Scale  Best Eye Response: 4-->(E4) spontaneous  Best Motor Response: 6-->(M6) obeys commands  Best Verbal Response: 5-->(V5) oriented  [place] Coma Scale Score: 15  Assessment Qualifiers: Patient not sedated/intubated, No eye obstruction present  Pupil PERRLA: yes     24 hr Temp:  [98.4 °F (36.9 °C)-98.8 °F (37.1 °C)]     CV:   Rhythm: normal sinus rhythm  BP goals:   SBP < 220  MAP > 65    Resp:           Plan: N/A    GI/:     Diet/Nutrition Received: full liquid  Last Bowel Movement: 06/25/25  Voiding Characteristics: external catheter    Intake/Output Summary (Last 24 hours) at 6/26/2025 1727  Last data filed at 6/26/2025 1448  Gross per 24 hour   Intake 956.92 ml   Output 1125 ml   Net -168.08 ml     Unmeasured Output  Unmeasured Urine Occurrence: 1    Labs/Accuchecks:  Recent Labs   Lab 06/26/25  0139   WBC 7.62   RBC 4.69   HGB 14.3   HCT 43.0         Recent Labs   Lab 06/26/25  0139      K 3.2*   CO2 23      BUN 10   CREATININE 0.6   ALKPHOS 75   ALT 8*   AST 13   BILITOT 0.6      Recent Labs   Lab 06/25/25  0544   PROTIME 12.0   INR 1.1   APTT 26.1      Recent Labs   Lab 06/25/25  0544   TROPONINI 0.006       Electrolytes: Electrolytes replaced  Accuchecks: Q6H    Gtts:      LDA/Wounds:    Emmanuel Risk Assessment  Sensory Perception: 2-->very limited  Moisture: 3-->occasionally moist  Activity: 1-->bedfast  Mobility: 2-->very limited  Nutrition: 2-->probably  inadequate  Friction and Shear: 2-->potential problem  Emmanuel Score: 12    Is your emmanuel score 12 or less? no            Restraints:        [facility]

## 2025-06-26 NOTE — PLAN OF CARE
OT evaluation comleted  Problem: Occupational Therapy  Goal: Occupational Therapy Goal  Description: Goals set 6/26 with an expiration date, 7/24:  Patient will increase functional independence with ADLs by performing:    Patient will demonstrate rolling to the right with min assist.  Not met   Patient will demonstrate rolling to the left with modified independence.   Not met  Patient will demonstrate supine -sit with min assist.   Not met  Patient will demonstrate stand pivot transfers with min assist.   Not met  Patient will demonstrate grooming while standing with min assist.   Not met  Patient will demonstrate upper body dressing with min assist while seated EOB.   Not met  Patient will demonstrate lower body dressing with mod assist while seated EOB.   Not met  Patient will demonstrate toileting with mod assist.   Not met  Patient will demonstrate bathing while seated EOB with mod assist.   Not met  Patient's family / caregiver will demonstrate independence and safety with assisting patient with self-care skills and functional mobility.     Not met  Patient's family / caregiver will demonstrate independence with providing ROM and changes in bed positioning.   Not met  Patient and/or patient's family will verbalize understanding of stroke prevention guidelines, personal risk factors and stroke warning signs via teachback method.  Not met           6/26/2025 0647 by Martha Staples LOTR  Outcome: Progressing  6/26/2025 0646 by Martha Staples LOTR  Outcome: Progressing

## 2025-06-26 NOTE — PLAN OF CARE
PT Eval complete, appropriate goals created    Problem: Physical Therapy  Goal: Physical Therapy Goal  Description: Goals to be completed by: 7/4/25    Pt will perform sup<>sit transfers w/ minimum assistance  Pt will be able to stand up from EOB w/ minimum assistance using LRAD  Pt will ambulate 10 feet w/ moderate assistance using LRAD  Transfer from bed to chair w/ modA using LRAD  Propel w/c 50' w/ CGA   Pt will be independent w/ HEP therex on BLE w/ good form and ROM   Outcome: Progressing

## 2025-06-26 NOTE — ASSESSMENT & PLAN NOTE
Cadence Vasques is a 49 y.o. female with PMH of HTN, tobacco use (0.5 PPD), substance abuse that presents as a transfer from [facility] for higher level of care of acute R MCA stroke.  She initially presented to [facility] ED with LSW, LFD, and dysarthria. LKW >24hrs prior, as family reported seeing her 3-4 days prior with facial droop and dysarthria. Telestroke NIHSS 12. CTH with hypoattenuation c/w recent infarcts, CTA revealed R M1 occlusion. Determined not a candidate for lytics or IR due to LKW being >24hrs prior. She was transferred to [facility] for further eval and stroke workup. MRI brain shows multifocal areas of moderate sized acute infarct involving the R MCA territory with localized mass effect but no MLS. She was evaluated by gen neuro at [facility], given risk for development of edema that would require neurosurgical intervention decision made to transfer patient to [facility] for admission to [facility] for higher level of care and close neuro monitoring.   On admit to [facility] neuro exam significant for LUE/LLE hemiplegia, severe LFD, mod-severe dysarthria, R gaze preference but is able to cross midline/track to L. NIHSS 13. NSGY consulted for hemicrani watch.    06/26/2025  JACOB. Neuro exam stable. Continue to work with PT/OT/SLP.       Antithrombotics for secondary stroke prevention: Antiplatelets: Aspirin: 81 mg daily  Clopidogrel: 75 mg daily    Statins for secondary stroke prevention and hyperlipidemia, if present: Statins: Atorvastatin- 40 mg daily    Aggressive risk factor modification: Smoking, Diet, Exercise     Rehab efforts: The patient has been evaluated by a stroke team provider and the therapy needs have been fully considered based off the presenting complaints and exam findings. The following therapy evaluations are needed: PT evaluate and treat, OT evaluate and treat, SLP evaluate and treat    Diagnostics ordered/pending: None     VTE prophylaxis: Heparin 5000 units SQ every 8  hours  Mechanical prophylaxis: Place SCDs    BP parameters: Infarct: No intervention, SBP <220

## 2025-06-26 NOTE — EICU
Virtual ICU Quality Rounds    Admit Date: 6/25/2025  Hospital Day: 1    ICU Day: 18h    24H Vital Sign Range:  Temp:  [98.4 °F (36.9 °C)-98.8 °F (37.1 °C)]   Pulse:  [56-86]   Resp:  [12-32]   BP: (120-164)/()   SpO2:  [93 %-100 %]     VICU Surveillance Screening  LDA reconciliation : Yes  Nursing orders placed : IP DEMETRIA Peripheral IV Access

## 2025-06-26 NOTE — HOSPITAL COURSE
6/26: CTH no shift, low stroke burden. Neuro stable  6/27: NAEON. Neuro stable  6/28: neuro stable, CTH stable

## 2025-06-26 NOTE — PT/OT/SLP EVAL
Physical Therapy Evaluation and Treatment    Patient Name: Cadence Vasques   MRN: [id number]  Recent Surgery: * No surgery found *      Recommendations:     Discharge Recommendations: High Intensity Therapy    Discharge Equipment Recommendations: bedside commode, wheelchair, hospital bed   Barriers to discharge: Increased level of assist  Nursing Mobilization: Patient not clear to transfer with RN/PCT, medi-chair transfer via drawsheet only.    Assessment:     Cadence Vasques is a 49 y.o. female admitted with a medical diagnosis of Cerebrovascular accident (CVA) due to occlusion of right middle cerebral artery. She presents with the following impairments/functional limitations: weakness, impaired sensation, impaired self care skills, impaired functional mobility, gait instability, impaired balance, impaired cognition, decreased coordination, decreased upper extremity function, decreased lower extremity function, decreased safety awareness, pain, decreased ROM, impaired coordination, impaired fine motor. Pt able to compensate for LUE/LE strength deficits when sitting EOB through use of RUE support to maintain balance, but w/ worsened posture control and midline orientation once standing 2/2 LLE buckling. Patient presents with good participation and motivation to return to prior level of function with high intensity therapy.  The patient demonstrates appropriate endurance and strength to participate in up to 3 hours or 15hrs of combined therapy post acute.     Rehab Prognosis: Good; patient would benefit from acute skilled PT services to address these deficits and reach maximum level of function.  Recent Surgery: * No surgery found *      Plan:     During this hospitalization, patient to be seen 4 x/week to address the identified rehab impairments via gait training, therapeutic activities, therapeutic exercises, neuromuscular re-education, wheelchair management/training and progress toward the following  "goals:    Plan of Care Expires: 07/04/25    Subjective     Chief Complaint: back pain and hunger (wants "real food" not liquid diet)  Patient/Family Comments/Goals: pt wanted to walk to bathroom at start of session; once standing recognized her weakness and inability to do so  Pain/Comfort:  Pain Rating Post-Intervention 1: 6/10  Location - Side 2: Bilateral  Location - Orientation 2: generalized  Location 2: back  Pain Addressed 2: Reposition, Distraction  Pain Rating Post-Intervention 2: other (see comments) (unrated, reports was improved when sitting EOB)    Patients cultural, spiritual, Scientology conflicts given the current situation: no    Social History:  Living Environment: Patient lives w/ cousin in a single story home, number of outside stairs: 2 no HR.  Prior Level of Function: Prior to admission, patient was independent with ADLs.  Equipment Used at Home: none    DME owned (not currently used): none  Assistance Upon Discharge: unknown    Objective:     Communicated with RN prior to session. Patient found HOB elevated with blood pressure cuff, pulse ox (continuous), telemetry, PureWick upon PT entry to room.    General Precautions: Standard, aspiration, fall   Orthopedic Precautions:N/A   Braces: N/A  Respiratory Status: Room air    Exams:  Cognitive Exam: Patient is oriented to Person, Place, Time, Situation, follows commands 75% of the time w/ inc cueing 2/2 frequently distracted  RLE ROM: WFL  RLE Strength: WFL  LLE ROM: WFL  LLE Strength: 0/5 throughout except hip flexion, extension, and rotation 2-/5  Sensation: -       Impaired  light/touch able to feel LLE but inconsistently reports dec sensation to light touch  Coordination: Heel to shin abnormal LLE    Functional Mobility:  Bed Mobility:  Scooting: moderate assistance  Supine to Sit: moderate assistance for LE management and trunk management  Sit to Supine: moderate assistance for L side management as pt attempted to lie down w/ entire L side of " body hanging out of bed  Transfers:  Sit to Stand: moderate assistance with hand-held assist with cues for hand placement and foot placement  Gait:   Patient ambulated 2' side steps to L along EOB with hand-held assist and maximal assistance. Patient demonstrates unsteady gait, decreased weight shift, decreased flako, decreased arm swing, and inability to advance LLE or maintain LLE stability requiring blocking of knee and therapist to advance leg while facilitating weight shift to R. Dec weight shift to L side 2/2 weakness of and inattention to L side. All lines remained intact throughout ambulation trial, gait belt utilized.  Balance:   Static Sitting: contact guard assistance at EOB w/ RUE support; Christiano w/out UE support  Dynamic Sitting: min-modA at EOB w/ 1 LOB to L requiring max A to recover  Static Standing: moderate assistance with hand-held assist  Dynamic Standing: maximal assistance with hand-held assist    Therapeutic Activities and Exercises:  Patient educated on role of acute care PT and PT POC, safety while in hospital including calling nurse for mobility, and call light usage  Patient educated about importance of OOB mobility  Educated on hooking RLE behind LLE to self-assist w/ leg management  Motor imagery, increasing attention to L side, and positioning of LUE/LE in bed  Rolling L/R in bed to reposition pt w/ wedges and pillows to offload pressure, avoid muscle contractures, and prevent skin breakdown. Pt and family educated on importance of pt being rotated every 2hrs for these reasons.     AM-PAC 6 CLICK MOBILITY  Turning over in bed (including adjusting bedclothes, sheets and blankets)?: 2  Sitting down on and standing up from a chair with arms (e.g., wheelchair, bedside commode, etc.): 2  Moving from lying on back to sitting on the side of the bed?: 2  Moving to and from a bed to a chair (including a wheelchair)?: 2  Need to walk in hospital room?: 1  Climbing 3-5 steps with a railing?:  1  Basic Mobility Total Score: 10     Patient left HOB elevated with all lines intact, call button in reach, RN notified, and family and MD present.    GOALS:   Multidisciplinary Problems       Physical Therapy Goals          Problem: Physical Therapy    Goal Priority Disciplines Outcome Interventions   Physical Therapy Goal     PT, PT/OT Progressing    Description: Goals to be completed by: 25    Pt will perform sup<>sit transfers w/ minimum assistance  Pt will be able to stand up from EOB w/ minimum assistance using LRAD  Pt will ambulate 10 feet w/ moderate assistance using LRAD  Transfer from bed to chair w/ modA using LRAD  Propel w/c 50' w/ CGA   Pt will be independent w/ HEP therex on BLE w/ good form and ROM                        History:     Past Medical History:   Diagnosis Date    Anemia     Fibroid (bleeding) (uterine)     GERD (gastroesophageal reflux disease)     Hypertension        Past Surgical History:   Procedure Laterality Date     SECTION         Time Tracking:     PT Received On: 25  PT Start Time: 1324  PT Stop Time: 1355  PT Total Time (min): 31 min     Billable Minutes: Evaluation 7, Therapeutic Activity 15, and Neuromuscular Re-education 9    2025

## 2025-06-26 NOTE — EICU
BESSIE Night Rounds Checklist  24H Vital Sign Range:  Temp:  [98.4 °F (36.9 °C)-98.8 °F (37.1 °C)]   Pulse:  [56-82]   Resp:  [12-32]   BP: (109-159)/()   SpO2:  [93 %-100 %]     Video rounds

## 2025-06-26 NOTE — PLAN OF CARE
06/26/25 1037   Rounds   Attendance Provider;   Discharge Plan A Other   Why the patient remains in the hospital Requires continued medical care   Transition of Care Barriers Substance Abuse     Wendie Rg MSW, LCSW  [facility]  Case Management Dept.

## 2025-06-26 NOTE — NURSING
K+ replacement initiated PO--pt needed 40 mEq to fully replace--chose 35mEq order as that was the closest option to what pt needed--pt tolerated PO much better than IV

## 2025-06-26 NOTE — PLAN OF CARE
[facility] Care Plan    POC reviewed with Cadence Vasques at 0300. Patient verbalized understanding. Questions and concerns addressed. No acute events today. Pt progressing toward goals. Will continue to monitor. See below and flowsheets for full assessment and VS info.     - CT completed  - Bath given, linens changed  - PRN tylenol given x 1 for back pain  - K replacing  - Lidocaine patch placed on R lower back  - PRN Zofran given x 1 for nausea      Is this a stroke patient? yes- Stroke booklet reviewed with patient and is at bedside.   Care Plan Individualization:     Neuro:  [place] Coma Scale  Best Eye Response: 4-->(E4) spontaneous  Best Motor Response: 6-->(M6) obeys commands  Best Verbal Response: 5-->(V5) oriented  Julia Coma Scale Score: 15  Assessment Qualifiers: Patient not sedated/intubated  Pupil PERRLA: yes     24 hr Temp:  [98.4 °F (36.9 °C)-98.8 °F (37.1 °C)]     CV:   Rhythm: normal sinus rhythm  BP goals:   SBP < 220  MAP > 65    Resp:           Plan: N/A    GI/:     Diet/Nutrition Received: full liquid  Last Bowel Movement: 06/25/25  Voiding Characteristics: external catheter    Intake/Output Summary (Last 24 hours) at 6/26/2025 0502  Last data filed at 6/26/2025 0302  Gross per 24 hour   Intake 350 ml   Output 175 ml   Net 175 ml          Labs/Accuchecks:  Recent Labs   Lab 06/26/25  0139   WBC 7.62   RBC 4.69   HGB 14.3   HCT 43.0         Recent Labs   Lab 06/26/25  0139      K 3.2*   CO2 23      BUN 10   CREATININE 0.6   ALKPHOS 75   ALT 8*   AST 13   BILITOT 0.6      Recent Labs   Lab 06/25/25  0544   PROTIME 12.0   INR 1.1   APTT 26.1      Recent Labs   Lab 06/25/25  0544   TROPONINI 0.006       Electrolytes: Electrolytes replaced  Accuchecks: Q6H    Gtts:      LDA/Wounds:    Emmanuel Risk Assessment  Sensory Perception: 2-->very limited  Moisture: 3-->occasionally moist  Activity: 1-->bedfast  Mobility: 2-->very limited  Nutrition: 3-->adequate  Friction and Shear:  2-->potential problem  Emmanuel Score: 13  Is your emmanuel score 12 or less? no          Restraints:        [facility]

## 2025-06-26 NOTE — ASSESSMENT & PLAN NOTE
Stroke risk factor. UDS + on admission    - on cessation  -Suspect cocaine w/d may be contributing to some of patient's fatigue/lethargy

## 2025-06-26 NOTE — PT/OT/SLP EVAL
"Speech Language Pathology Evaluation  Cognitive/Bedside Swallow    Patient Name:  Cadence Vasques   MRN:  [id number]  Admitting Diagnosis: Cerebrovascular accident (CVA) due to occlusion of right middle cerebral artery    Recommendations:                  General Recommendations:  Dysphagia therapy, Speech/language therapy, and Cognitive-linguistic therapy  Diet recommendations:  Soft & Bite Sized Diet - IDDSI Level 6, Thin liquids - IDDSI Level 0   Aspiration Precautions: 1 bite/sip at a time, Alternating bites/sips, Check for pocketing/oral residue, Eliminate distractions, Feed only when awake/alert, HOB to 90 degrees, Meds whole 1 at a time, Small bites/sips, and Strict aspiration precautions   General Precautions: Standard, aspiration, fall  Communication strategies:  provide increased time to answer and go to room if call light pushed  Discharge recommendations:  High Intensity Therapy   Barriers to Discharge:  Level of Skilled Assistance Needed      Assessment:     Cadence Vasques is a 49 y.o. female with an SLP diagnosis of Dysphagia, Dysarthria, Cognitive-Linguistic Impairment, and Visio-Spatial Impairment.     History:     Past Medical History:   Diagnosis Date    Anemia     Fibroid (bleeding) (uterine)     GERD (gastroesophageal reflux disease)     Hypertension        Past Surgical History:   Procedure Laterality Date     SECTION         Social History: Patient reports she lives with her cousin, IND pta including working cleaning houses.  She reports she does not drive, reg diet/thin liquids at baseline.    Prior Intubation HX:  none this admission     Modified Barium Swallow: none reported    Chest X-Rays: no new      Subjective     "My back hurts."     Nurse cleared for session, assisted SLP in repositioning pt upright for safety with trials. Pt alert though distracted t/o assessment.     Pain/Comfort:  Pain Rating 1: 6/10  Location 1: back  Pain Addressed 1: Distraction, Reposition, " Nurse notified  Pain Rating Post-Intervention 1: 6/10    Respiratory Status: Room air    Objective:     Cognitive Status:    Orientation Oriented x4  Memory Immediate Recall 100% and Delayed 2/3 unassociated words recalled after delay, 3/3 with min cues   Problem Solving Categories x1/2, Sequencing x1/1 given repetition, Solutions x3/3, and Compare/contrast x2/2      Receptive Language:   Comprehension:      WFL      Expressive Language:  Verbal:    fluent        Motor Speech:  Dysarthria moderate, imprecise articulation, strained vocal quality    Voice:   Quality Hoarse and Strained- pt reports not at baseline    Visual-Spatial:  Left Neglect     Reading:   tba     Written Expression:   tba    Oral Musculature Evaluation  Oral Musculature: left weakness  Dentition: scattered dentition  Secretion Management: adequate  Mucosal Quality: adequate  Mandibular Strength and Mobility: impaired (mild)  Oral Labial Strength and Mobility: impaired retraction, impaired pursing, impaired seal, impaired coordination  Lingual Strength and Mobility: impaired strength, impaired protrusion  Volitional Cough: decreased strength  Volitional Swallow: delayed  Voice Prior to PO Intake: strained, hoarse    Bedside Swallow Eval:   Consistencies Assessed:  Thin liquids 10 oz   Puree    Solids       Oral Phase:   Anterior loss  Prolonged mastication  Impaired rotational chew  Pocketing Left mild    Pharyngeal Phase:   no overt clinical signs/symptoms of aspiration    Compensatory Strategies  None    Treatment: Education provided re: role of SLP, diet recs, swallow precs, s/s aspiration, and POC.  Pt would benefit from reinforcement.       Goals:   Multidisciplinary Problems       SLP Goals          Problem: SLP    Goal Priority Disciplines Outcome   SLP Goal     SLP Progressing   Description: Speech Language Pathology Goals  Goals expected to be met by 7/10  1. Pt will participate in ongoing assessment of swallow.   2. Pt will implement  simple speech strategies at sentence level with min cues.  3. Pt will complete higher level reasoning/deduction tasks with 90% accy.   4. Pt will complete simple L attention tasks with 90% accy with max cues.   5. Pt will complete assessment of reading, writing, visual spatial skills to determine need for tx.                                Plan:     Patient to be seen:  4 x/week   Plan of Care expires:  07/26/25  Plan of Care reviewed with:  patient   SLP Follow-Up:  Yes       Time Tracking:     SLP Treatment Date:   06/26/25  Speech Start Time:  0646  Speech Stop Time:  0707     Speech Total Time (min):  21 min    Billable Minutes: Eval 11  and Eval Swallow and Oral Function 10    06/26/2025

## 2025-06-27 LAB
ABSOLUTE EOSINOPHIL (OHS): 0.05 K/UL
ABSOLUTE MONOCYTE (OHS): 0.53 K/UL (ref 0.3–1)
ABSOLUTE NEUTROPHIL COUNT (OHS): 3.7 K/UL (ref 1.8–7.7)
ALBUMIN SERPL BCP-MCNC: 3.9 G/DL (ref 3.5–5.2)
ALP SERPL-CCNC: 81 UNIT/L (ref 40–150)
ALT SERPL W/O P-5'-P-CCNC: 8 UNIT/L (ref 10–44)
ANION GAP (OHS): 10 MMOL/L (ref 8–16)
AST SERPL-CCNC: 14 UNIT/L (ref 11–45)
BASOPHILS # BLD AUTO: 0.07 K/UL
BASOPHILS NFR BLD AUTO: 0.8 %
BILIRUB SERPL-MCNC: 0.6 MG/DL (ref 0.1–1)
BUN SERPL-MCNC: 9 MG/DL (ref 6–20)
CALCIUM SERPL-MCNC: 9.3 MG/DL (ref 8.7–10.5)
CHLORIDE SERPL-SCNC: 102 MMOL/L (ref 95–110)
CO2 SERPL-SCNC: 24 MMOL/L (ref 23–29)
CREAT SERPL-MCNC: 0.6 MG/DL (ref 0.5–1.4)
ERYTHROCYTE [DISTWIDTH] IN BLOOD BY AUTOMATED COUNT: 13.3 % (ref 11.5–14.5)
GFR SERPLBLD CREATININE-BSD FMLA CKD-EPI: >60 ML/MIN/1.73/M2
GLUCOSE SERPL-MCNC: 87 MG/DL (ref 70–110)
HCT VFR BLD AUTO: 44.4 % (ref 37–48.5)
HGB BLD-MCNC: 14.9 GM/DL (ref 12–16)
IMM GRANULOCYTES # BLD AUTO: 0.02 K/UL (ref 0–0.04)
IMM GRANULOCYTES NFR BLD AUTO: 0.2 % (ref 0–0.5)
LYMPHOCYTES # BLD AUTO: 4.22 K/UL (ref 1–4.8)
MAGNESIUM SERPL-MCNC: 1.9 MG/DL (ref 1.6–2.6)
MCH RBC QN AUTO: 31.4 PG (ref 27–31)
MCHC RBC AUTO-ENTMCNC: 33.6 G/DL (ref 32–36)
MCV RBC AUTO: 94 FL (ref 82–98)
NUCLEATED RBC (/100WBC) (OHS): 0 /100 WBC
PHOSPHATE SERPL-MCNC: 4.5 MG/DL (ref 2.7–4.5)
PLATELET # BLD AUTO: 308 K/UL (ref 150–450)
PMV BLD AUTO: 10.2 FL (ref 9.2–12.9)
POCT GLUCOSE: 112 MG/DL (ref 70–110)
POCT GLUCOSE: 92 MG/DL (ref 70–110)
POTASSIUM SERPL-SCNC: 4.3 MMOL/L (ref 3.5–5.1)
PROT SERPL-MCNC: 8 GM/DL (ref 6–8.4)
RBC # BLD AUTO: 4.75 M/UL (ref 4–5.4)
RELATIVE EOSINOPHIL (OHS): 0.6 %
RELATIVE LYMPHOCYTE (OHS): 49.1 % (ref 18–48)
RELATIVE MONOCYTE (OHS): 6.2 % (ref 4–15)
RELATIVE NEUTROPHIL (OHS): 43.1 % (ref 38–73)
SODIUM SERPL-SCNC: 136 MMOL/L (ref 136–145)
WBC # BLD AUTO: 8.59 K/UL (ref 3.9–12.7)

## 2025-06-27 PROCEDURE — 92507 TX SP LANG VOICE COMM INDIV: CPT

## 2025-06-27 PROCEDURE — 11000001 HC ACUTE MED/SURG PRIVATE ROOM

## 2025-06-27 PROCEDURE — 84100 ASSAY OF PHOSPHORUS: CPT | Performed by: PHYSICIAN ASSISTANT

## 2025-06-27 PROCEDURE — 97110 THERAPEUTIC EXERCISES: CPT

## 2025-06-27 PROCEDURE — 83735 ASSAY OF MAGNESIUM: CPT | Performed by: PHYSICIAN ASSISTANT

## 2025-06-27 PROCEDURE — 99233 SBSQ HOSP IP/OBS HIGH 50: CPT | Mod: ,,, | Performed by: PSYCHIATRY & NEUROLOGY

## 2025-06-27 PROCEDURE — 99232 SBSQ HOSP IP/OBS MODERATE 35: CPT | Mod: ,,, | Performed by: STUDENT IN AN ORGANIZED HEALTH CARE EDUCATION/TRAINING PROGRAM

## 2025-06-27 PROCEDURE — 97112 NEUROMUSCULAR REEDUCATION: CPT

## 2025-06-27 PROCEDURE — 97530 THERAPEUTIC ACTIVITIES: CPT

## 2025-06-27 PROCEDURE — 25000003 PHARM REV CODE 250: Performed by: PHYSICIAN ASSISTANT

## 2025-06-27 PROCEDURE — 92526 ORAL FUNCTION THERAPY: CPT

## 2025-06-27 PROCEDURE — 80053 COMPREHEN METABOLIC PANEL: CPT | Performed by: PHYSICIAN ASSISTANT

## 2025-06-27 PROCEDURE — 85025 COMPLETE CBC W/AUTO DIFF WBC: CPT | Performed by: PHYSICIAN ASSISTANT

## 2025-06-27 PROCEDURE — 94761 N-INVAS EAR/PLS OXIMETRY MLT: CPT

## 2025-06-27 PROCEDURE — 63600175 PHARM REV CODE 636 W HCPCS: Performed by: PHYSICIAN ASSISTANT

## 2025-06-27 RX ADMIN — ACETAMINOPHEN 650 MG: 325 TABLET ORAL at 08:06

## 2025-06-27 RX ADMIN — SENNOSIDES AND DOCUSATE SODIUM 1 TABLET: 50; 8.6 TABLET ORAL at 08:06

## 2025-06-27 RX ADMIN — ENOXAPARIN SODIUM 40 MG: 40 INJECTION SUBCUTANEOUS at 06:06

## 2025-06-27 RX ADMIN — METHOCARBAMOL 500 MG: 500 TABLET ORAL at 11:06

## 2025-06-27 RX ADMIN — ASPIRIN 81 MG: 81 TABLET, COATED ORAL at 08:06

## 2025-06-27 RX ADMIN — ATORVASTATIN CALCIUM 40 MG: 40 TABLET, FILM COATED ORAL at 08:06

## 2025-06-27 RX ADMIN — METHOCARBAMOL 500 MG: 500 TABLET ORAL at 05:06

## 2025-06-27 RX ADMIN — CLOPIDOGREL 75 MG: 75 TABLET ORAL at 08:06

## 2025-06-27 RX ADMIN — METHOCARBAMOL 500 MG: 500 TABLET ORAL at 06:06

## 2025-06-27 RX ADMIN — LIDOCAINE 1 PATCH: 50 PATCH CUTANEOUS at 05:06

## 2025-06-27 NOTE — PT/OT/SLP PROGRESS
Physical Therapy Treatment    Patient Name:  Cadence Vasques   MRN:  [id number]    Recommendations:     Discharge Recommendations: High Intensity Therapy  Discharge Equipment Recommendations: bedside commode, wheelchair  Barriers to discharge: inc level of assist required  RN mobility: 2 person assist for stand pivot t/f to R side    Assessment:     Cadence Vasques is a 49 y.o. female admitted with a medical diagnosis of Cerebrovascular accident (CVA) due to occlusion of right middle cerebral artery.  She presents with the following impairments/functional limitations: weakness, impaired sensation, impaired self care skills, impaired functional mobility, gait instability, visual deficits, impaired balance, impaired cognition, decreased coordination, decreased upper extremity function, decreased lower extremity function, decreased safety awareness, pain, impaired coordination, impaired fine motor Pt A + O x 4 and consented to PT. Pt highly motivated to return to PLOF with improvement in attention to task, ability to self assist L extremities during mobility, and safety awareness. Continues to require frequent reminders about impairments due to limited insight on impact of her deficits. Patient has demonstrated sufficient progression to warrant high intensity therapy evidenced by objectives noted below.    Rehab Prognosis: Good; patient would benefit from acute skilled PT services to address these deficits and reach maximum level of function.    Recent Surgery: * No surgery found *      Plan:     During this hospitalization, patient to be seen 5 x/week to address the identified rehab impairments via gait training, therapeutic activities, therapeutic exercises, neuromuscular re-education, wheelchair management/training and progress toward the following goals:    Plan of Care Expires:  07/04/25    Subjective     Chief Complaint: hip pain and dizziness  Patient/Family Comments/goals: to return to PLOF; short  term goal to walk to bathroom  Pain/Comfort:  Location 1:  (intermittently reporting L hip pain 6/10)  Pain Addressed 1: Reposition, Distraction, Nurse notified, Cessation of Activity      Objective:     Communicated with RN prior to session.  Patient found supine with telemetry, PureWick, bed alarm, pressure relief boots, SCD sleeves on but not connected to the box upon PT entry to room.     General Precautions: Standard, aspiration, fall  Orthopedic Precautions: N/A  Braces: N/A  Respiratory Status: Room air     Functional Mobility:  Bed Mobility:     Rolling Right: stand by assistance with cuing for L side management  Scooting: contact guard assistance  Supine to Sit: minimum assistance with cuing to hook RLE behind LLE to self-assist w/ leg management; use of bed rail to pull up on w/ RUE x2 reps; performed towards R side  Sit to Supine: moderate assistance at L LE and trunk  x2 reps    Sit to Stand:    3 reps   moderate assistance on L side w/ LLE blocked and LUE supported; additional second person on R side for handheld assistance  Dizziness reported upon standing, which decreased in sitting. Dizziness did not increase with second sit to stand transfer  Assistance required to pre-position LLE underneath her, significant weight shift towards R side when performing transfer  Gait:   One side step to R then one side step to L with HHA, using Max A x 2  Required knee blocking and manual facilitation/advancement of L LE  Pt needed cuing to look up and move R LE; shuffles R foot along floor 2/2 pt not wanting to weight shift onto LLE 2/2 weakness  Balance:   Sitting balance  Static: CGA with R UE support   Dynamic: Min A with R UE support  Standing balance  Static: Max A x 1 on L side w/ LLE blocked    AM-PAC 6 CLICK MOBILITY  Turning over in bed (including adjusting bedclothes, sheets and blankets)?: 3  Sitting down on and standing up from a chair with arms (e.g., wheelchair, bedside commode, etc.): 2  Moving from  lying on back to sitting on the side of the bed?: 3  Moving to and from a bed to a chair (including a wheelchair)?: 2  Need to walk in hospital room?: 1  Climbing 3-5 steps with a railing?: 1  Basic Mobility Total Score: 12       Treatment & Education:  Pt educated on importance of sitting with HOB elevated and hydration to prevent orthostatic hypotension  Pt educated on safe mobility, use of call bell, and management of and increasing attention to LUE/LE  Rolling L/R in bed to reposition pt w/ wedges and pillows to offload pressure, avoid muscle contractures, and prevent skin breakdown. Pt educated on importance of pt being rotated every 2hrs for these reasons.    Patient left right sidelying and HOB elevated with all lines intact, call button in reach, bed alarm on, and RN notified. RN also notified pt w/ SCD box not present.    GOALS:   Multidisciplinary Problems       Physical Therapy Goals          Problem: Physical Therapy    Goal Priority Disciplines Outcome Interventions   Physical Therapy Goal     PT, PT/OT Progressing    Description: Goals to be completed by: 7/4/25    Pt will perform sup<>sit transfers w/ minimum assistance  Pt will be able to stand up from EOB w/ minimum assistance using LRAD  Pt will ambulate 10 feet w/ moderate assistance using LRAD  Transfer from bed to chair w/ modA using LRAD  Propel w/c 50' w/ CGA   Pt will be independent w/ HEP therex on BLE w/ good form and ROM                        DME Justifications:   Cadence requires a commode for home use because she is confined to a single room.  Cadence Vasques has a mobility limitation that significantly impairs her ability to participate in one or more mobility related activities of daily living (MRADLs) such as toileting, feeding, dressing, grooming, and bathing in customary locations in the home.  The mobility limitation cannot be sufficiently resolved by the use of a cane or walker.   The use of a manual wheelchair will  significantly improve the patients ability to participate in MRADLS and the patient will use it on regular basis in the home.  Cadence Vasques has expressed her willingness to use a manual wheelchair in the home. Patients upper body strength is sufficient for propulsion.  She also has a caregiver who is available, willing, and able to provide assistance with the wheelchair when needed.      Time Tracking:     PT Received On: 06/27/25  PT Start Time: 1411     PT Stop Time: 1442  PT Total Time (min): 31 min     Billable Minutes: Therapeutic Exercise 15 and Neuromuscular Re-education 16    Treatment Type: Treatment  PT/PTA: PT     Number of PTA visits since last PT visit: 0     06/27/2025

## 2025-06-27 NOTE — NURSING
Patient Transferred to NPU Room 908 @1300      Upon arrival to the floor, patient greeted and oriented to room. Complete head to toe assessment completed per protocol. VSS, see flowsheet for details. Neuro assessment completed. Cardiac monitoring orders reviewed. Patient added to tele monitor in nursing station, rate and rhythm verified. Primary team notified of patient's transfer to floor. All current and transfer orders reviewed/reconciled per protocol. All emergency equipment set up in patient's room. Fall/seizure precautions initiated per providers orders. 4 Eyes skin assessment performed, see flowsheets for details. Reviewed assessment and rounding frequency with patient and family. Questions were encouraged and addressed. Repositioned patient for comfort with bed locked in lowest position, side rails up x 3, bed alarm set, and call light within reach. Instructed patient to call staff for mobility/assistance, verbalized understanding. No acute signs of distress noted at this time.       NIHSS assessment completed on floor arrival. Patient's NIHSS score is 12 at this time. SCDs applied to patient per provider's orders. Galvan bedside swallow screen completed per stroke protocol. Patient has passed galvan bedside swallow screen, team notified of results. Stroke book individualized to patient and given to patient upon transfer. Reviewed stroke book with the patient at the bedside, see education flowsheets for details.

## 2025-06-27 NOTE — PROGRESS NOTES
Adrian Verma - Neurosurgery ([facility])  Vascular Neurology  Comprehensive Stroke Center  Progress Note    Assessment/Plan:     * Cerebrovascular accident (CVA) due to occlusion of right middle cerebral artery  Cadence Vasques is a 49 y.o. female with PMH of HTN, tobacco use (0.5 PPD), substance abuse that presents as a transfer from [facility] for higher level of care of acute R MCA stroke.  She initially presented to [facility] ED with LSW, LFD, and dysarthria. LKW >24hrs prior, as family reported seeing her 3-4 days prior with facial droop and dysarthria. Telestroke NIHSS 12. CTH with hypoattenuation c/w recent infarcts, CTA revealed R M1 occlusion. Determined not a candidate for lytics or IR due to LKW being >24hrs prior. She was transferred to [facility] for further eval and stroke workup. MRI brain shows multifocal areas of moderate sized acute infarct involving the R MCA territory with localized mass effect but no MLS. She was evaluated by gen neuro at [facility], given risk for development of edema that would require neurosurgical intervention decision made to transfer patient to [facility] for admission to [facility] for higher level of care and close neuro monitoring.   On admit to [facility] neuro exam significant for LUE/LLE hemiplegia, severe LFD, mod-severe dysarthria, R gaze preference but is able to cross midline/track to L. NIHSS 13. NSGY consulted for hemicrani watch.    06/27/2025  NAEON. Drowsy. Awakens to voice. LUE plegic. LLE strength improving. PT/OT recs HIT. Stepped down to NPU. Awaiting transfer.       Antithrombotics for secondary stroke prevention: Antiplatelets: Aspirin: 81 mg daily  Clopidogrel: 75 mg daily    Statins for secondary stroke prevention and hyperlipidemia, if present: Statins: Atorvastatin- 40 mg daily    Aggressive risk factor modification: Smoking, Diet, Exercise     Rehab efforts: The patient has been evaluated by a stroke team provider and the therapy needs have been fully  considered based off the presenting complaints and exam findings. The following therapy evaluations are needed: PT evaluate and treat, OT evaluate and treat, SLP evaluate and treat HIT    Diagnostics ordered/pending: None     VTE prophylaxis: Heparin 5000 units SQ every 8 hours  Mechanical prophylaxis: Place SCDs    BP parameters: Infarct: No intervention, SBP <220      Hypokalemia  -Resolved  -K 4.2, 6/27    Essential hypertension  -Stroke risk factor  -SBP goal <160, maintain MAP >65  -BP range in the last 24 hrs: BP  Min: 113/69  Max: 180/95  -Current antihypertensive regimen:   --PRN labetalol/hydralazine      Cocaine use  -Stroke risk factor  -UDS + cocaine  - on cessation    Tobacco dependency  -Stroke risk factor  - on cessation  -Consider nicotine patch PRN  -Referral for smoking cessation at discharge if patient agreeable         06/26/2025  NAEON. CTH with similar distribution of recent right MCA distribution infarcts without evidence of hemorrhagic conversion or midline shift; No new major vascular distribution infarct. Neuro exam stable. Diet advanced to soft/bite sized. Continue to work with PT/OT.  06/27/2025 NAEON. Drowsy. Awakens to voice. LUE plegic. LLE strength improving. PT/OT recs HIT. Stepped down to NPU. Awaiting transfer.     STROKE DOCUMENTATION        NIH Scale:  1a. Level of Consciousness: 1-->Not alert, but arousable by minor stimulation to obey, answer, or respond  1b. LOC Questions: 0-->Answers both questions correctly  1c. LOC Commands: 0-->Performs both tasks correctly  2. Best Gaze: 0-->Normal  3. Visual: 0-->No visual loss  4. Facial Palsy: 2-->Partial paralysis (total or near-total paralysis of lower face)  5a. Motor Arm, Left: 4-->No movement  5b. Motor Arm, Right: 0-->No drift, limb holds 90 (or 45) degrees for full 10 secs  6a. Motor Leg, Left: 3-->No effort against gravity, leg falls to bed immediately  6b. Motor Leg, Right: 0-->No drift, leg holds 30 degree  position for full 5 secs  7. Limb Ataxia: 0-->Absent  8. Sensory: 1-->Mild-to-moderate sensory loss, patient feels pinprick is less sharp or is dull on the affected side, or there is a loss of superficial pain with pinprick, but patient is aware of being touched  9. Best Language: 0-->No aphasia, normal  10. Dysarthria: 2-->Severe dysarthria, patients speech is so slurred as to be unintelligible in the absence of or out of proportion to any dysphasia, or is mute/anarthric  11. Extinction and Inattention (formerly Neglect): 0-->No abnormality  Total (NIH Stroke Scale): 13       Modified Stutsman Score: 0  Julia Coma Scale:14   ABCD2 Score:    [id number] Score:   HAS -BLED Score:   ICH Score:   Hunt & Jose Classification:      Hemorrhagic change of an Ischemic Stroke: Does this patient have an ischemic stroke with hemorrhagic changes? No     Neurologic Chief Complaint: Cerebrovascular Accident due to occlusion of Right MCA    Subjective:     Interval History: Patient is seen for follow-up neurological assessment and treatment recommendations: See Hospital Course    HPI, Past Medical, Family, and Social History remains the same as documented in the initial encounter.     Review of Systems   Respiratory: Negative.     Cardiovascular: Negative.    Neurological:  Positive for weakness.     Scheduled Meds:   aspirin  81 mg Oral Daily    atorvastatin  40 mg Oral Daily    clopidogreL  75 mg Oral Daily    enoxparin  40 mg Subcutaneous Daily    LIDOcaine  1 patch Transdermal Q24H    methocarbamoL  500 mg Oral Q6H    senna-docusate  1 tablet Oral BID     Continuous Infusions:  PRN Meds:  Current Facility-Administered Medications:     acetaminophen, 650 mg, Oral, Q6H PRN    bisacodyL, 10 mg, Rectal, Daily PRN    labetalol, 10 mg, Intravenous, Q6H PRN    ondansetron, 4 mg, Intravenous, Q8H PRN    sodium chloride 0.9%, 10 mL, Intravenous, PRN    Objective:     Vital Signs (Most Recent):  Temp: 98.1 °F (36.7 °C) (06/27/25  "1300)  Pulse: 79 (06/27/25 1300)  Resp: 20 (06/27/25 1300)  BP: 123/83 (06/27/25 1300)  SpO2: 98 % (06/27/25 1300)  BP Location: Left arm    Vital Signs Range (Last 24H):  Temp:  [97.9 °F (36.6 °C)-98.8 °F (37.1 °C)]   Pulse:  []   Resp:  [12-43]   BP: (113-180)/()   SpO2:  [95 %-100 %]   BP Location: Left arm       Physical Exam  Vitals and nursing note reviewed.   HENT:      Mouth/Throat:      Mouth: Mucous membranes are moist.   Eyes:      Pupils: Pupils are equal, round, and reactive to light.   Cardiovascular:      Rate and Rhythm: Normal rate.   Pulmonary:      Effort: No respiratory distress.   Skin:     General: Skin is warm and dry.   Neurological:      Mental Status: She is oriented to person, place, and time and easily aroused.              Neurological Exam:   LOC: drowsy  Attention Span: poor  Language: No aphasia  Articulation: Dysarthria  Orientation: Person, Place, Time   Facial Movement (CN VII): Lower facial weakness on the Left  Motor: Arm left  Plegia 0/5  Leg left  Paresis: 1/5  Arm right  Normal 5/5  Leg right Normal 5/5    Laboratory:  CMP:   Recent Labs   Lab 06/27/25  0153   CALCIUM 9.3   ALBUMIN 3.9   PROT 8.0      K 4.3   CO2 24      BUN 9   CREATININE 0.6   ALKPHOS 81   ALT 8*   AST 14   BILITOT 0.6     BMP:   Recent Labs   Lab 06/27/25  0153      K 4.3      CO2 24   BUN 9   CREATININE 0.6   CALCIUM 9.3     CBC:   Recent Labs   Lab 06/27/25  0153   WBC 8.59   RBC 4.75   HGB 14.9   HCT 44.4      MCV 94   MCH 31.4*   MCHC 33.6     Lipid Panel:   Recent Labs   Lab 06/24/25  1855   CHOL 236*   LDLCALC 152.4   HDL 74   TRIG 48     Coagulation:   Recent Labs   Lab 06/25/25  0544   INR 1.1   APTT 26.1     Platelet Aggregation Study: No results for input(s): "PLTAGG", "PLTAGINTERP", "PLTAGREGLACO", "ADPPLTAGGREG" in the last 168 hours.  Hgb A1C:   Recent Labs   Lab 06/25/25  0544   HGBA1C 5.5     TSH:   Recent Labs   Lab 06/24/25  1855   TSH 0.347* "       Diagnostic Results   CT HEAD W/O CONTRAST 6/26/25  Impression:     Similar distribution of recent right MCA distribution infarcts without evidence of hemorrhagic conversion or midline shift.     No new major vascular distribution infarct.     8 mm partially calcified lesion with adjacent cyst formation in the region of the right choroid fissure/medial temporal lobe.  This is of unclear etiology and although other lesions are not excluded, a cavernous malformation to be considered.  MR imaging with contrast when the patient is stable would be helpful for further characterization.     Brain Imaging   MRI BRAIN W/O CONTRAST 6/25/25  Impression:     1. Motion compromised, prematurely terminated study.  2. Corresponding to findings on prior CT/CTA imaging of 06/24/2025, multifocal areas of expansile restricted diffusion T2 weighted signal abnormality are noted throughout the right MCA territory in keeping with acute to early subacute infarct.  No definite macroscopic hemorrhage or significant mass effect at the present time.  3. Diminutive flow related signal of right MCA branches within the sylvian fissure, in keeping with distal M1 occlusion demonstrated on prior CTA performed 06/24/2025.  Serpiginous FLAIR hyperintense signal in this region in keeping with slow and/or disorganized flow due to the proximal occlusion.     Vessel Imaging   CTA HEAD AND NECK 6/24/24  Impression:     1. No acute intracranial abnormality.  2. No large vessel occlusion.  3.  Partially imaged 3.8 cm circumscribed soft tissue density/ mass extending from the superior mediastinum to the right suprahilar region of unclear etiology.     % stenosis derived by comparing the narrowest segment with the distal luminal diameter as related to the reported measure of arterial narrowing.        Cardiac Imaging   ECHO 6/25/25    Left Ventricle: The left ventricle is normal in size. Normal wall thickness. There is normal systolic function. Quantitated  ejection fraction is 62%. There is normal diastolic function.    Right Ventricle: The right ventricle is normal in size measuring 3.4 cm. Wall thickness is normal. Systolic function is normal.    Aortic Valve: There is mild aortic regurgitation.    Mitral Valve: Mildly thickened anterior leaflet of undetermined significance.  Consider KAREN for better evaluation of the mitral valve in the setting of stroke    Pulmonary Artery: The estimated pulmonary artery systolic pressure is 17 mmHg.    IVC/SVC: Normal venous pressure at 3 mmHg.    Study is negative for shunt.       Carole Hernandez NP  [facility]  Department of Vascular Neurology   [facility] Neurosurgery [facility])

## 2025-06-27 NOTE — PROGRESS NOTES
Adrian Verma - Neurosurgery ([facility])  Neurocritical Care  Progress Note    Admit Date: 6/25/2025  Service Date: 06/27/2025  Length of Stay: 2    Subjective:     Chief Complaint: Cerebrovascular accident (CVA) due to occlusion of right middle cerebral artery    History of Present Illness: Cadence Vasques is a 49 y.o. female w/ PMH of HTN, GERD, anemia, fibroids, tobacco use, and cocaine use who presented to [facility] ED for LSW, LS numbness, LFD, and dysarthria that began at least 4-5 days prior to her presentation. Patient initially transferred to another outside hospital and was then transferred to [facility] due to concern for worsening exam findings. NIH was reported as 12 on her initial presentation. CTA showed R distal M1 occlusion. MRI showed multifocal areas of diffusion restriction throughout the R MCA distribution. Patient transferred to [facility] NCC for HLOC.    Hospital Course: 06/26/2025 Confirmed initial onset of symptoms on 6/19, worsened overnight on 6/23-6/24. Imaging reviewed, <1/2 of MCA territory, low risk for malignant MCA syndrome. No worsening of symptoms w/ mobilization w/ therapies. Denies IVDU. Stable for transfer to floor.   06/27/2025 NAEON, more aware and alert this AM. Transfer to floor pending bed availability.     Interval History:  Please see hospital course above for full details     Review of Systems   Constitutional:  Positive for activity change and fatigue.   Respiratory: Negative.     Cardiovascular: Negative.    Gastrointestinal: Negative.    Neurological:  Positive for facial asymmetry, speech difficulty, weakness and numbness. Negative for seizures.       Objective:     Vitals:  Temp: 97.9 °F (36.6 °C)  Pulse: 85  Rhythm: normal sinus rhythm  BP: 132/80  MAP (mmHg): 104  Resp: (!) 21  SpO2: 98 %    Temp  Min: 97.9 °F (36.6 °C)  Max: 98.8 °F (37.1 °C)  Pulse  Min: 59  Max: 100  BP  Min: 113/69  Max: 180/95  MAP (mmHg)  Min: 84  Max: 127  Resp  Min: 12  Max: 43  SpO2  Min: 95 %   Max: 100 %    06/26 0701 - 06/27 0700  In: 756.9 [P.O.:550]  Out: 1050 [Urine:1050]   Unmeasured Output  Unmeasured Urine Occurrence: 1        Physical Exam  General Appearance: Middle aged woman resting in bed, appears older than stated age. Not in acute hemodynamic distress  Mental Status Exam: awake and alert, much improved from prior. Oriented x4, able to follow commands on R.    Cranial Nerves: R gaze preference, able to cross midline, unable to bury to L. + BTT b/l. PERRL. Mild dysarthria w/ L facial droop.  Motor: no drift in the RUE/RLE. LUE 1/5 distally, 0/5 proximally; LLE 2/5 proximally, 0/5 distally   Sensory: Decreased sensation to noxious over L hemibody, ? L hemivisual neglect  Coordination: Unable to assess  Vascular: S1/S2 of normal intensity, no S3/S4 appreciated, no murmurs appreciated  Lungs: CTA bilaterally without wheezing  Abdomen: Soft, non-distended, non-tender, BS +       Medications:  Continuous Scheduledaspirin, 81 mg, Daily  atorvastatin, 40 mg, Daily  clopidogreL, 75 mg, Daily  enoxparin, 40 mg, Daily  LIDOcaine, 1 patch, Q24H  methocarbamoL, 500 mg, Q6H  senna-docusate, 1 tablet, BID    PRNacetaminophen, 650 mg, Q6H PRN  bisacodyL, 10 mg, Daily PRN  labetalol, 10 mg, Q6H PRN  ondansetron, 4 mg, Q8H PRN  sodium chloride 0.9%, 10 mL, PRN      Today I personally reviewed pertinent laboratory results, notably: No new imaging to review. CBC and CMP unremarkable.     Diet  Diet Soft & Bite Sized (IDDSI Level 6)  Diet Soft & Bite Sized (IDDSI Level 6)      Assessment/Plan:     Neuro  * Cerebrovascular accident (CVA) due to occlusion of right middle cerebral artery  Cadence Vasques is a 49 y.o. female w/ PMH of HTN, GERD, anemia, fibroids, tobacco use, and cocaine use who presented to OSH for L sided weakness and dysarthria, initial onset of symptoms on 6/19 w/ possible worsening on 6/23-6/24. Transferred to [facility] for higher level of care.     Exam c/w R MCA syndrome. Patient OOW for any  intervention. ESUS stroke.     Admit to NCC  NSGY consulted given patient's age and size of infarct; no acute intervention at this time per their recommendations -> plan for repeat head CT on 6/28   Stroke burden <1/2 of territory, does not appear sufficient in size to be concerned for malignant MCA syndrome   No worsening of symptoms with normotension and working w/ therapies, does not appear perfusion dependent  TTE w/ thickened mitral valve -> pt denies IVDU, however given ESUS stroke may benefit from KAREN, defer to vascular neurology  DAPT/statin for secondary stroke prevention   CBC, CMP, mag, and phos daily  SBP goal < 220  PRN labetalol and hydralazine  Vascular Neurology consulted   on tobacco and cocaine cessation  SCD; lovenox  PT/OT/SLP -> acute rehab    Stable for transfer to floor for ongoing management. No clear ICU needs at this time.         Psychiatric  Cocaine use  Stroke risk factor. UDS + on admission    - on cessation  -Suspect cocaine w/d may be contributing to some of patient's fatigue/lethargy -> improved this AM    Cardiac/Vascular  Essential hypertension  Stroke risk factor    - Normotensive since admission off medications    Other  LUCINDA (obstructive sleep apnea)  Noted to desat when sleeping  Suspect undiagnosed LUCINDA    - Will need outpt sleep study     Tobacco dependency  Stroke risk factor.  -encourage cessation            The patient is being Prophylaxed for:  Venous Thromboembolism with: Mechanical or Chemical  Stress Ulcer with: None  Ventilator Pneumonia with: not applicable    Activity Orders            Diet Soft & Bite Sized (IDDSI Level 6): Soft & Bite Sized starting at 06/26 1615    Turn patient starting at 06/25 1600    Elevate HOB starting at 06/25 1434          Full Code    Stable for transfer to floor    Level III visit    Ruma Carrero MD  Neurocritical Care  [facility] - Neurocritical Care Unit

## 2025-06-27 NOTE — SUBJECTIVE & OBJECTIVE
": JACOB. Neuro stable    Prescriptions Prior to Admission[1]    Review of patient's allergies indicates:   Allergen Reactions    Opioids - morphine analogues Hives and Itching     Reaction noted after re-assessment of getting Morphine.        Past Medical History:   Diagnosis Date    Anemia     Fibroid (bleeding) (uterine)     GERD (gastroesophageal reflux disease)     Hypertension      Past Surgical History:   Procedure Laterality Date     SECTION       Family History    None       Tobacco Use    Smoking status: Every Day     Current packs/day: 0.50     Types: Cigarettes    Smokeless tobacco: Not on file   Vaping Use    Vaping status: Every Day   Substance and Sexual Activity    Alcohol use: Yes     Comment: drinks beer once per week    Drug use: Yes     Types: "Crack" cocaine    Sexual activity: Not on file     Review of Systems   Unable to perform ROS: Acuity of condition     Objective:     Weight: 60.5 kg (133 lb 6.1 oz)  Body mass index is 22.89 kg/m².  Vital Signs (Most Recent):  Temp: 98.4 °F (36.9 °C) (25 0302)  Pulse: 92 (25 06)  Resp: (!) 34 (25)  BP: 133/83 (25)  SpO2: 100 % (25) Vital Signs (24h Range):  Temp:  [98.2 °F (36.8 °C)-98.8 °F (37.1 °C)] 98.4 °F (36.9 °C)  Pulse:  [] 92  Resp:  [12-43] 34  SpO2:  [95 %-100 %] 100 %  BP: (118-202)/() 133/83               Neurosurgery Physical Exam  General: Well developed, well nourished.  Head: Normocephalic.  GCS: Motor: 6/Verbal: 5/Eyes: 4 GCS Total: 15  Eyes: Pupils equal, round, reactive to light with accomodation, EOMI. Right gaze preference.  CN: Left sided facial droop.  Motor Strength: Moves right side full strength and to command. Left sided plegia.   Language/speech: Dysarthria.  Pulses: 2+ and symmetric radial and dorsalis pedis.  Skin: Skin is warm, dry and intact.    Significant Labs:  Recent Labs   Lab 25  0139 25  0153   GLU 86 87    136   K 3.2* 4.3   CL " "104 102   CO2 23 24   BUN 10 9   CREATININE 0.6 0.6   CALCIUM 9.3 9.3   MG 1.9 1.9     Recent Labs   Lab 06/26/25  0139 06/27/25  0153   WBC 7.62 8.59   HGB 14.3 14.9   HCT 43.0 44.4    308     No results for input(s): "LABPT", "INR", "APTT" in the last 48 hours.    Microbiology Results (last 7 days)       ** No results found for the last 168 hours. **          All pertinent labs from the last 24 hours have been reviewed.    Significant Diagnostics:  I have reviewed and interpreted all pertinent imaging results/findings within the past 24 hours.               [1]   Medications Prior to Admission   Medication Sig Dispense Refill Last Dose/Taking    aspirin (ECOTRIN) 81 MG EC tablet Take 1 tablet (81 mg total) by mouth once daily. 360 tablet 0     atorvastatin (LIPITOR) 40 MG tablet Take 1 tablet (40 mg total) by mouth once daily. 90 tablet 3     clopidogreL (PLAVIX) 75 mg tablet Take 1 tablet (75 mg total) by mouth once daily. for 21 days 21 tablet 0     FERROUS FUMARATE (IRON ORAL) Take by mouth.       omeprazole (PRILOSEC) 20 MG capsule Take 20 mg by mouth once daily.        "

## 2025-06-27 NOTE — ASSESSMENT & PLAN NOTE
Cadence Vasques is a 49 y.o. female with PMH of HTN, tobacco use (0.5 PPD), substance abuse that presents as a transfer from [facility] for higher level of care of acute R MCA stroke.  She initially presented to [facility] ED with LSW, LFD, and dysarthria. LKW >24hrs prior, as family reported seeing her 3-4 days prior with facial droop and dysarthria. Telestroke NIHSS 12. CTH with hypoattenuation c/w recent infarcts, CTA revealed R M1 occlusion. Determined not a candidate for lytics or IR due to LKW being >24hrs prior. She was transferred to [facility] for further eval and stroke workup. MRI brain shows multifocal areas of moderate sized acute infarct involving the R MCA territory with localized mass effect but no MLS. She was evaluated by gen neuro at [facility], given risk for development of edema that would require neurosurgical intervention decision made to transfer patient to [facility] for admission to [facility] for higher level of care and close neuro monitoring.   On admit to [facility] neuro exam significant for LUE/LLE hemiplegia, severe LFD, mod-severe dysarthria, R gaze preference but is able to cross midline/track to L. NIHSS 13. NSGY consulted for hemicrani watch.    06/27/2025  NAEON. Drowsy. Awakens to voice. LUE plegic. LLE strength improving. PT/OT recs HIT. Stepped down to NPU. Awaiting transfer.       Antithrombotics for secondary stroke prevention: Antiplatelets: Aspirin: 81 mg daily  Clopidogrel: 75 mg daily    Statins for secondary stroke prevention and hyperlipidemia, if present: Statins: Atorvastatin- 40 mg daily    Aggressive risk factor modification: Smoking, Diet, Exercise     Rehab efforts: The patient has been evaluated by a stroke team provider and the therapy needs have been fully considered based off the presenting complaints and exam findings. The following therapy evaluations are needed: PT evaluate and treat, OT evaluate and treat, SLP evaluate and treat HIT    Diagnostics  ordered/pending: None     VTE prophylaxis: Heparin 5000 units SQ every 8 hours  Mechanical prophylaxis: Place SCDs    BP parameters: Infarct: No intervention, SBP <220

## 2025-06-27 NOTE — EICU
Virtual ICU Quality Rounds    Admit Date: 6/25/2025  Hospital Day: 2    ICU Day: 1d 18h    24H Vital Sign Range:  Temp:  [98.2 °F (36.8 °C)-98.8 °F (37.1 °C)]   Pulse:  []   Resp:  [12-43]   BP: (118-180)/()   SpO2:  [95 %-100 %]     VICU Surveillance Screening  LDA reconciliation : Yes

## 2025-06-27 NOTE — ASSESSMENT & PLAN NOTE
Stroke risk factor. UDS + on admission    - on cessation  -Suspect cocaine w/d may be contributing to some of patient's fatigue/lethargy -> improved this AM

## 2025-06-27 NOTE — PLAN OF CARE
[facility] Care Plan    POC reviewed with Cadence Vasques at 0100. Patient verbalized understanding. Questions and concerns addressed. No acute events today. Pt progressing toward goals. Will continue to monitor. See below and flowsheets for full assessment and VS info.     - NAEON  - Pending stepdown  - PRN tylenol given x 1 for pain      Is this a stroke patient? yes- Stroke booklet reviewed with patient and is at bedside.   Care Plan Individualization:     Neuro:  [place] Coma Scale  Best Eye Response: 3-->(E3) to speech  Best Motor Response: 6-->(M6) obeys commands  Best Verbal Response: 5-->(V5) oriented  [place] Coma Scale Score: 14  Assessment Qualifiers: Patient not sedated/intubated  Pupil PERRLA: yes     24 hr Temp:  [98.2 °F (36.8 °C)-98.8 °F (37.1 °C)]     CV:   Rhythm: normal sinus rhythm  BP goals:   SBP < 220  MAP > 65    Resp:           Plan: N/A    GI/:     Diet/Nutrition Received: mechanical/dental soft  Last Bowel Movement: 06/25/25  Voiding Characteristics: external catheter    Intake/Output Summary (Last 24 hours) at 6/27/2025 0329  Last data filed at 6/26/2025 2302  Gross per 24 hour   Intake 756.92 ml   Output 1250 ml   Net -493.08 ml     Unmeasured Output  Unmeasured Urine Occurrence: 1    Labs/Accuchecks:  Recent Labs   Lab 06/27/25  0153   WBC 8.59   RBC 4.75   HGB 14.9   HCT 44.4         Recent Labs   Lab 06/27/25  0153      K 4.3   CO2 24      BUN 9   CREATININE 0.6   ALKPHOS 81   ALT 8*   AST 14   BILITOT 0.6      Recent Labs   Lab 06/25/25  0544   PROTIME 12.0   INR 1.1   APTT 26.1      Recent Labs   Lab 06/25/25  0544   TROPONINI 0.006       Electrolytes: N/A - electrolytes WDL  Accuchecks: Q6H    Gtts:      LDA/Wounds:    Emmanuel Risk Assessment  Sensory Perception: 2-->very limited  Moisture: 3-->occasionally moist  Activity: 1-->bedfast  Mobility: 2-->very limited  Nutrition: 3-->adequate  Friction and Shear: 2-->potential problem  Emmanuel Score: 13  Is your  vanessa score 12 or less? no          Restraints:        TM

## 2025-06-27 NOTE — PROGRESS NOTES
"[facility] - Neuro Critical Care  Neurosurgery  Progress Note    Subjective:     History of Present Illness: Cadence Vasques is a 49 y.o. female transferred to [facility] for R M1 occlusion, presenting with L sided plegia, L FD, Right gaze preference and dysarthria. Family at bedside reports symptoms began Thursday of last week. They noticed the patient was slurring her words and stumbling. Patient is currently on aspirin/plavix. Urine tox positive for cocaine.     Post-Op Info:  * No surgery found *       : JACOB. Neuro stable    Prescriptions Prior to Admission[1]    Review of patient's allergies indicates:   Allergen Reactions    Opioids - morphine analogues Hives and Itching     Reaction noted after re-assessment of getting Morphine.        Past Medical History:   Diagnosis Date    Anemia     Fibroid (bleeding) (uterine)     GERD (gastroesophageal reflux disease)     Hypertension      Past Surgical History:   Procedure Laterality Date     SECTION       Family History    None       Tobacco Use    Smoking status: Every Day     Current packs/day: 0.50     Types: Cigarettes    Smokeless tobacco: Not on file   Vaping Use    Vaping status: Every Day   Substance and Sexual Activity    Alcohol use: Yes     Comment: drinks beer once per week    Drug use: Yes     Types: "Crack" cocaine    Sexual activity: Not on file     Review of Systems   Unable to perform ROS: Acuity of condition     Objective:     Weight: 60.5 kg (133 lb 6.1 oz)  Body mass index is 22.89 kg/m².  Vital Signs (Most Recent):  Temp: 98.4 °F (36.9 °C) (25 0302)  Pulse: 92 (25 0602)  Resp: (!) 34 (25 06)  BP: 133/83 (25 0602)  SpO2: 100 % (25 06) Vital Signs (24h Range):  Temp:  [98.2 °F (36.8 °C)-98.8 °F (37.1 °C)] 98.4 °F (36.9 °C)  Pulse:  [] 92  Resp:  [12-43] 34  SpO2:  [95 %-100 %] 100 %  BP: (118-202)/() 133/83               Neurosurgery Physical Exam  General: Well developed, well " "nourished.  Head: Normocephalic.  GCS: Motor: 6/Verbal: 5/Eyes: 4 GCS Total: 15  Eyes: Pupils equal, round, reactive to light with accomodation, EOMI. Right gaze preference.  CN: Left sided facial droop.  Motor Strength: Moves right side full strength and to command. Left sided plegia.   Language/speech: Dysarthria.  Pulses: 2+ and symmetric radial and dorsalis pedis.  Skin: Skin is warm, dry and intact.    Significant Labs:  Recent Labs   Lab 06/26/25  0139 06/27/25  0153   GLU 86 87    136   K 3.2* 4.3    102   CO2 23 24   BUN 10 9   CREATININE 0.6 0.6   CALCIUM 9.3 9.3   MG 1.9 1.9     Recent Labs   Lab 06/26/25 0139 06/27/25  0153   WBC 7.62 8.59   HGB 14.3 14.9   HCT 43.0 44.4    308     No results for input(s): "LABPT", "INR", "APTT" in the last 48 hours.    Microbiology Results (last 7 days)       ** No results found for the last 168 hours. **          All pertinent labs from the last 24 hours have been reviewed.    Significant Diagnostics:  I have reviewed and interpreted all pertinent imaging results/findings within the past 24 hours.          Assessment/Plan:     * Cerebrovascular accident (CVA) due to occlusion of right middle cerebral artery  Cadence Vasques is a 49 y.o. female transferred to [facility] for R M1 occlusion, presenting with L sided plegia, L FD, Right gaze preference and dysarthria. Family at bedside reports symptoms began Thursday of last week. They noticed the patient was slurring her words and stumbling. Patient is currently on aspirin/plavix. Urine tox positive for cocaine.     CTH 6/24: No midline shift. There is mild effacement of the right lateral ventricle frontal horn.  CTA 6/24: No LVO.  MRI Brain 6/25: R MCA infarct.  CTH 6/26 no shift , minimal stroke burden    - Admitted to [facility].  - q1h neurochecks.  - No acute neurosurgical intervention.   - Do not anticipate need for any neurosurgical intervention  - Medical management per [facility].    D/w  " Chastity.            Juanacrlos Abdullahi MD  Neurosurgery  [facility] - Neuro Critical Care       [1]   Medications Prior to Admission   Medication Sig Dispense Refill Last Dose/Taking    aspirin (ECOTRIN) 81 MG EC tablet Take 1 tablet (81 mg total) by mouth once daily. 360 tablet 0     atorvastatin (LIPITOR) 40 MG tablet Take 1 tablet (40 mg total) by mouth once daily. 90 tablet 3     clopidogreL (PLAVIX) 75 mg tablet Take 1 tablet (75 mg total) by mouth once daily. for 21 days 21 tablet 0     FERROUS FUMARATE (IRON ORAL) Take by mouth.       omeprazole (PRILOSEC) 20 MG capsule Take 20 mg by mouth once daily.

## 2025-06-27 NOTE — PLAN OF CARE
Problem: Adult Inpatient Plan of Care  Goal: Plan of Care Review  Outcome: Progressing  Goal: Optimal Comfort and Wellbeing  Outcome: Progressing  Goal: Readiness for Transition of Care  Outcome: Progressing     Problem: Skin Injury Risk Increased  Goal: Skin Health and Integrity  Outcome: Progressing     Problem: Stroke, Ischemic (Includes Transient Ischemic Attack)  Goal: Optimal Cognitive Function  Outcome: Progressing  Goal: Improved Communication Skills  Outcome: Progressing  Goal: Optimal Functional Ability  Outcome: Progressing  Goal: Safe and Effective Swallow  Outcome: Progressing  Goal: Effective Urinary Elimination  Outcome: Progressing    Stroke booklet @ bedside, rw w/pt. Verbalized understanding

## 2025-06-27 NOTE — ASSESSMENT & PLAN NOTE
Cadence Vasques is a 49 y.o. female transferred to [facility] for R M1 occlusion, presenting with L sided plegia, L FD, Right gaze preference and dysarthria. Family at bedside reports symptoms began Thursday of last week. They noticed the patient was slurring her words and stumbling. Patient is currently on aspirin/plavix. Urine tox positive for cocaine.     CTH 6/24: No midline shift. There is mild effacement of the right lateral ventricle frontal horn.  CTA 6/24: No LVO.  MRI Brain 6/25: R MCA infarct.  CTH 6/26 no shift , minimal stroke burden    - Admitted to [facility].  - q1h neurochecks.  - No acute neurosurgical intervention.   - Do not anticipate need for any neurosurgical intervention  - Medical management per [facility].    D/w Dr. Haywood.

## 2025-06-27 NOTE — ASSESSMENT & PLAN NOTE
-Stroke risk factor  - on cessation  -Consider nicotine patch PRN  -Referral for smoking cessation at discharge if patient agreeable

## 2025-06-27 NOTE — ASSESSMENT & PLAN NOTE
-Stroke risk factor  -SBP goal <160, maintain MAP >65  -BP range in the last 24 hrs: BP  Min: 113/69  Max: 180/95  -Current antihypertensive regimen:   --PRN labetalol/hydralazine

## 2025-06-27 NOTE — PT/OT/SLP PROGRESS
"Speech Language Pathology Treatment    Patient Name:  Cadence Vasques   MRN:  [id number]  Admitting Diagnosis: Cerebrovascular accident (CVA) due to occlusion of right middle cerebral artery    Recommendations:                 General Recommendations:  Dysphagia therapy, Speech/language therapy, and Cognitive-linguistic therapy  Diet recommendations:  Soft & Bite Sized Diet - IDDSI Level 6, Thin liquids - IDDSI Level 0   Aspiration Precautions: 1 bite/sip at a time, Alternating bites/sips, Check for pocketing/oral residue, Eliminate distractions, Feed only when awake/alert, HOB to 90 degrees, Meds whole 1 at a time, Small bites/sips, and Strict aspiration precautions   General Precautions: Standard, aspiration, fall  Communication strategies:  provide increased time to answer and go to room if call light pushed  Discharge recommendations:  High Intensity Therapy   Barriers to Discharge:  Level of Skilled Assistance Needed      Assessment:     aCdence Vasques is a 49 y.o. female with an SLP diagnosis of Dysphagia, Dysarthria, Cognitive-Linguistic Impairment, and Visio-Spatial Impairment.     Subjective     "It went down good yesterday" re: PO    Pain/Comfort:  Pain Rating 1: 6/10  Location 1: back  Pain Addressed 1: Distraction, Nurse notified, Reposition  Pain Rating Post-Intervention 1: 6/10    Respiratory Status: Room air    Objective:     Has the patient been evaluated by SLP for swallowing?   Yes  Keep patient NPO? No     Pt seen bedside, alert and cooperative. Speech intelligibility in conversation was fair-good given careful listening and repetitions.  Speech strategies reviewed and demonstrated. Pt able to implement at sentence level given min cues.  Cues required t/o session to implement strategies in conversation.  She was able to visually track SLP to L given increased time and min cues.  Difficulty telling time on clock on L side of room.  L attention strategies also reviewed. Soft and bite " sized diet at bedside. SLP assisted with set-up and pt self feeding approx 50% of tray.  Min cues provided for pt to decrease sip size and rate of feeding. Some short term carryover noted though reinforcement required.  Mild oral stasis clears with second swallow/liquid wash.  No overt s/s aspiration. Education provided re: ongoing diet recs, swallow precs, s/s aspiration, negative outcomes associated with aspiration, silent aspiration risk, speech strategies, L attention strategies,and POC.  Pt verbalized understanding though would benefit from reinforcement.     Goals:   Multidisciplinary Problems       SLP Goals          Problem: SLP    Goal Priority Disciplines Outcome   SLP Goal     SLP Progressing   Description: Speech Language Pathology Goals  Goals expected to be met by 7/10  1. Pt will participate in ongoing assessment of swallow.   2. Pt will implement simple speech strategies at sentence level with min cues.  3. Pt will complete higher level reasoning/deduction tasks with 90% accy.   4. Pt will complete simple L attention tasks with 90% accy with max cues.   5. Pt will complete assessment of reading, writing, visual spatial skills to determine need for tx.                                Plan:     Patient to be seen:  4 x/week   Plan of Care expires:  07/26/25  Plan of Care reviewed with:  patient   SLP Follow-Up:  Yes       Time Tracking:     SLP Treatment Date:   06/27/25  Speech Start Time:  0741  Speech Stop Time:  0800     Speech Total Time (min):  19 min    Billable Minutes: Speech Therapy Individual 9 and Treatment Swallowing Dysfunction 10    06/27/2025

## 2025-06-27 NOTE — SUBJECTIVE & OBJECTIVE
Interval History:  Please see hospital course above for full details     Review of Systems   Constitutional:  Positive for activity change and fatigue.   Respiratory: Negative.     Cardiovascular: Negative.    Gastrointestinal: Negative.    Neurological:  Positive for facial asymmetry, speech difficulty, weakness and numbness. Negative for seizures.       Objective:     Vitals:  Temp: 97.9 °F (36.6 °C)  Pulse: 85  Rhythm: normal sinus rhythm  BP: 132/80  MAP (mmHg): 104  Resp: (!) 21  SpO2: 98 %    Temp  Min: 97.9 °F (36.6 °C)  Max: 98.8 °F (37.1 °C)  Pulse  Min: 59  Max: 100  BP  Min: 113/69  Max: 180/95  MAP (mmHg)  Min: 84  Max: 127  Resp  Min: 12  Max: 43  SpO2  Min: 95 %  Max: 100 %    06/26 0701 - 06/27 0700  In: 756.9 [P.O.:550]  Out: 1050 [Urine:1050]   Unmeasured Output  Unmeasured Urine Occurrence: 1        Physical Exam  General Appearance: Middle aged woman resting in bed, appears older than stated age. Not in acute hemodynamic distress  Mental Status Exam: awake and alert, much improved from prior. Oriented x4, able to follow commands on R.    Cranial Nerves: R gaze preference, able to cross midline, unable to bury to L. + BTT b/l. PERRL. Mild dysarthria w/ L facial droop.  Motor: no drift in the RUE/RLE. LUE 1/5 distally, 0/5 proximally; LLE 2/5 proximally, 0/5 distally   Sensory: Decreased sensation to noxious over L hemibody, ? L hemivisual neglect  Coordination: Unable to assess  Vascular: S1/S2 of normal intensity, no S3/S4 appreciated, no murmurs appreciated  Lungs: CTA bilaterally without wheezing  Abdomen: Soft, non-distended, non-tender, BS +       Medications:  Continuous Scheduledaspirin, 81 mg, Daily  atorvastatin, 40 mg, Daily  clopidogreL, 75 mg, Daily  enoxparin, 40 mg, Daily  LIDOcaine, 1 patch, Q24H  methocarbamoL, 500 mg, Q6H  senna-docusate, 1 tablet, BID    PRNacetaminophen, 650 mg, Q6H PRN  bisacodyL, 10 mg, Daily PRN  labetalol, 10 mg, Q6H PRN  ondansetron, 4 mg, Q8H PRN  sodium  chloride 0.9%, 10 mL, PRN      Today I personally reviewed pertinent laboratory results, notably: No new imaging to review. CBC and CMP unremarkable.     Diet  Diet Soft & Bite Sized (IDDSI Level 6)  Diet Soft & Bite Sized (IDDSI Level 6)

## 2025-06-27 NOTE — SUBJECTIVE & OBJECTIVE
Neurologic Chief Complaint: Cerebrovascular Accident due to occlusion of Right MCA    Subjective:     Interval History: Patient is seen for follow-up neurological assessment and treatment recommendations: See Hospital Course    HPI, Past Medical, Family, and Social History remains the same as documented in the initial encounter.     Review of Systems   Respiratory: Negative.     Cardiovascular: Negative.    Neurological:  Positive for weakness.     Scheduled Meds:   aspirin  81 mg Oral Daily    atorvastatin  40 mg Oral Daily    clopidogreL  75 mg Oral Daily    enoxparin  40 mg Subcutaneous Daily    LIDOcaine  1 patch Transdermal Q24H    methocarbamoL  500 mg Oral Q6H    senna-docusate  1 tablet Oral BID     Continuous Infusions:  PRN Meds:  Current Facility-Administered Medications:     acetaminophen, 650 mg, Oral, Q6H PRN    bisacodyL, 10 mg, Rectal, Daily PRN    labetalol, 10 mg, Intravenous, Q6H PRN    ondansetron, 4 mg, Intravenous, Q8H PRN    sodium chloride 0.9%, 10 mL, Intravenous, PRN    Objective:     Vital Signs (Most Recent):  Temp: 98.1 °F (36.7 °C) (06/27/25 1300)  Pulse: 79 (06/27/25 1300)  Resp: 20 (06/27/25 1300)  BP: 123/83 (06/27/25 1300)  SpO2: 98 % (06/27/25 1300)  BP Location: Left arm    Vital Signs Range (Last 24H):  Temp:  [97.9 °F (36.6 °C)-98.8 °F (37.1 °C)]   Pulse:  []   Resp:  [12-43]   BP: (113-180)/()   SpO2:  [95 %-100 %]   BP Location: Left arm       Physical Exam  Vitals and nursing note reviewed.   HENT:      Mouth/Throat:      Mouth: Mucous membranes are moist.   Eyes:      Pupils: Pupils are equal, round, and reactive to light.   Cardiovascular:      Rate and Rhythm: Normal rate.   Pulmonary:      Effort: No respiratory distress.   Skin:     General: Skin is warm and dry.   Neurological:      Mental Status: She is oriented to person, place, and time and easily aroused.              Neurological Exam:   LOC: drowsy  Attention Span: poor  Language: No  "aphasia  Articulation: Dysarthria  Orientation: Person, Place, Time   Facial Movement (CN VII): Lower facial weakness on the Left  Motor: Arm left  Plegia 0/5  Leg left  Paresis: 1/5  Arm right  Normal 5/5  Leg right Normal 5/5    Laboratory:  CMP:   Recent Labs   Lab 06/27/25  0153   CALCIUM 9.3   ALBUMIN 3.9   PROT 8.0      K 4.3   CO2 24      BUN 9   CREATININE 0.6   ALKPHOS 81   ALT 8*   AST 14   BILITOT 0.6     BMP:   Recent Labs   Lab 06/27/25  0153      K 4.3      CO2 24   BUN 9   CREATININE 0.6   CALCIUM 9.3     CBC:   Recent Labs   Lab 06/27/25  0153   WBC 8.59   RBC 4.75   HGB 14.9   HCT 44.4      MCV 94   MCH 31.4*   MCHC 33.6     Lipid Panel:   Recent Labs   Lab 06/24/25  1855   CHOL 236*   LDLCALC 152.4   HDL 74   TRIG 48     Coagulation:   Recent Labs   Lab 06/25/25  0544   INR 1.1   APTT 26.1     Platelet Aggregation Study: No results for input(s): "PLTAGG", "PLTAGINTERP", "PLTAGREGLACO", "ADPPLTAGGREG" in the last 168 hours.  Hgb A1C:   Recent Labs   Lab 06/25/25  0544   HGBA1C 5.5     TSH:   Recent Labs   Lab 06/24/25  1855   TSH 0.347*       Diagnostic Results   CT HEAD W/O CONTRAST 6/26/25  Impression:     Similar distribution of recent right MCA distribution infarcts without evidence of hemorrhagic conversion or midline shift.     No new major vascular distribution infarct.     8 mm partially calcified lesion with adjacent cyst formation in the region of the right choroid fissure/medial temporal lobe.  This is of unclear etiology and although other lesions are not excluded, a cavernous malformation to be considered.  MR imaging with contrast when the patient is stable would be helpful for further characterization.     Brain Imaging   MRI BRAIN W/O CONTRAST 6/25/25  Impression:     1. Motion compromised, prematurely terminated study.  2. Corresponding to findings on prior CT/CTA imaging of 06/24/2025, multifocal areas of expansile restricted diffusion T2 weighted " signal abnormality are noted throughout the right MCA territory in keeping with acute to early subacute infarct.  No definite macroscopic hemorrhage or significant mass effect at the present time.  3. Diminutive flow related signal of right MCA branches within the sylvian fissure, in keeping with distal M1 occlusion demonstrated on prior CTA performed 06/24/2025.  Serpiginous FLAIR hyperintense signal in this region in keeping with slow and/or disorganized flow due to the proximal occlusion.     Vessel Imaging   CTA HEAD AND NECK 6/24/24  Impression:     1. No acute intracranial abnormality.  2. No large vessel occlusion.  3.  Partially imaged 3.8 cm circumscribed soft tissue density/ mass extending from the superior mediastinum to the right suprahilar region of unclear etiology.     % stenosis derived by comparing the narrowest segment with the distal luminal diameter as related to the reported measure of arterial narrowing.        Cardiac Imaging   ECHO 6/25/25    Left Ventricle: The left ventricle is normal in size. Normal wall thickness. There is normal systolic function. Quantitated ejection fraction is 62%. There is normal diastolic function.    Right Ventricle: The right ventricle is normal in size measuring 3.4 cm. Wall thickness is normal. Systolic function is normal.    Aortic Valve: There is mild aortic regurgitation.    Mitral Valve: Mildly thickened anterior leaflet of undetermined significance.  Consider KAREN for better evaluation of the mitral valve in the setting of stroke    Pulmonary Artery: The estimated pulmonary artery systolic pressure is 17 mmHg.    IVC/SVC: Normal venous pressure at 3 mmHg.    Study is negative for shunt.

## 2025-06-27 NOTE — NURSING
1201: RN Called report to NISH Alonso for 908 NPU    1235:          Nursing Transfer Note       Transfer To     Transfer via bed    Transfer with cardiac monitoring    Transported by RN    Medicines sent: N/A    Chart sent with patient: Yes    Belongings sent with patient: (specify belongings)    Notified: sister at bedside    Bedside Neuro assessment performed: Yes    Does the patient have altered skin integrity? no       Bedside Handoff given to: NISH Graf    Upon arrival to floor: cardiac monitor applied, patient oriented to room, call bell in reach, and bed in lowest position

## 2025-06-27 NOTE — ASSESSMENT & PLAN NOTE
Cadence aVsques is a 49 y.o. female w/ PMH of HTN, GERD, anemia, fibroids, tobacco use, and cocaine use who presented to OSH for L sided weakness and dysarthria, initial onset of symptoms on 6/19 w/ possible worsening on 6/23-6/24. Transferred to [facility] for higher level of care.     Exam c/w R MCA syndrome. Patient OOW for any intervention. ESUS stroke.     Admit to NCC  NSGY consulted given patient's age and size of infarct; no acute intervention at this time per their recommendations -> plan for repeat head CT on 6/28   Stroke burden <1/2 of territory, does not appear sufficient in size to be concerned for malignant MCA syndrome   No worsening of symptoms with normotension and working w/ therapies, does not appear perfusion dependent  TTE w/ thickened mitral valve -> pt denies IVDU, however given ESUS stroke may benefit from KAREN, defer to vascular neurology  DAPT/statin for secondary stroke prevention   CBC, CMP, mag, and phos daily  SBP goal < 220  PRN labetalol and hydralazine  Vascular Neurology consulted   on tobacco and cocaine cessation  SCD; lovenox  PT/OT/SLP -> acute rehab    Stable for transfer to floor for ongoing management. No clear ICU needs at this time.

## 2025-06-28 LAB
ABSOLUTE EOSINOPHIL (OHS): 0.04 K/UL
ABSOLUTE MONOCYTE (OHS): 0.66 K/UL (ref 0.3–1)
ABSOLUTE NEUTROPHIL COUNT (OHS): 3.91 K/UL (ref 1.8–7.7)
ALBUMIN SERPL BCP-MCNC: 3.5 G/DL (ref 3.5–5.2)
ALP SERPL-CCNC: 70 UNIT/L (ref 40–150)
ALT SERPL W/O P-5'-P-CCNC: 6 UNIT/L (ref 10–44)
ANION GAP (OHS): 10 MMOL/L (ref 8–16)
AST SERPL-CCNC: 12 UNIT/L (ref 11–45)
BASOPHILS # BLD AUTO: 0.05 K/UL
BASOPHILS NFR BLD AUTO: 0.7 %
BILIRUB SERPL-MCNC: 0.4 MG/DL (ref 0.1–1)
BUN SERPL-MCNC: 11 MG/DL (ref 6–20)
CALCIUM SERPL-MCNC: 9.1 MG/DL (ref 8.7–10.5)
CHLORIDE SERPL-SCNC: 103 MMOL/L (ref 95–110)
CO2 SERPL-SCNC: 25 MMOL/L (ref 23–29)
CREAT SERPL-MCNC: 0.6 MG/DL (ref 0.5–1.4)
ERYTHROCYTE [DISTWIDTH] IN BLOOD BY AUTOMATED COUNT: 13.2 % (ref 11.5–14.5)
GFR SERPLBLD CREATININE-BSD FMLA CKD-EPI: >60 ML/MIN/1.73/M2
GLUCOSE SERPL-MCNC: 88 MG/DL (ref 70–110)
HCT VFR BLD AUTO: 40.2 % (ref 37–48.5)
HGB BLD-MCNC: 13.5 GM/DL (ref 12–16)
IMM GRANULOCYTES # BLD AUTO: 0.02 K/UL (ref 0–0.04)
IMM GRANULOCYTES NFR BLD AUTO: 0.3 % (ref 0–0.5)
LYMPHOCYTES # BLD AUTO: 2.95 K/UL (ref 1–4.8)
MAGNESIUM SERPL-MCNC: 1.8 MG/DL (ref 1.6–2.6)
MCH RBC QN AUTO: 31 PG (ref 27–31)
MCHC RBC AUTO-ENTMCNC: 33.6 G/DL (ref 32–36)
MCV RBC AUTO: 92 FL (ref 82–98)
NUCLEATED RBC (/100WBC) (OHS): 0 /100 WBC
PHOSPHATE SERPL-MCNC: 4.4 MG/DL (ref 2.7–4.5)
PLATELET # BLD AUTO: 302 K/UL (ref 150–450)
PMV BLD AUTO: 10.8 FL (ref 9.2–12.9)
POTASSIUM SERPL-SCNC: 3.4 MMOL/L (ref 3.5–5.1)
PROT SERPL-MCNC: 7.3 GM/DL (ref 6–8.4)
RBC # BLD AUTO: 4.36 M/UL (ref 4–5.4)
RELATIVE EOSINOPHIL (OHS): 0.5 %
RELATIVE LYMPHOCYTE (OHS): 38.7 % (ref 18–48)
RELATIVE MONOCYTE (OHS): 8.7 % (ref 4–15)
RELATIVE NEUTROPHIL (OHS): 51.1 % (ref 38–73)
SODIUM SERPL-SCNC: 138 MMOL/L (ref 136–145)
WBC # BLD AUTO: 7.63 K/UL (ref 3.9–12.7)

## 2025-06-28 PROCEDURE — 83735 ASSAY OF MAGNESIUM: CPT | Performed by: PHYSICIAN ASSISTANT

## 2025-06-28 PROCEDURE — 63600175 PHARM REV CODE 636 W HCPCS: Performed by: PHYSICIAN ASSISTANT

## 2025-06-28 PROCEDURE — 11000001 HC ACUTE MED/SURG PRIVATE ROOM

## 2025-06-28 PROCEDURE — 25000003 PHARM REV CODE 250: Performed by: PHYSICIAN ASSISTANT

## 2025-06-28 PROCEDURE — 25000003 PHARM REV CODE 250

## 2025-06-28 PROCEDURE — 94761 N-INVAS EAR/PLS OXIMETRY MLT: CPT

## 2025-06-28 PROCEDURE — 36415 COLL VENOUS BLD VENIPUNCTURE: CPT | Performed by: PHYSICIAN ASSISTANT

## 2025-06-28 PROCEDURE — 85025 COMPLETE CBC W/AUTO DIFF WBC: CPT | Performed by: PHYSICIAN ASSISTANT

## 2025-06-28 PROCEDURE — 84460 ALANINE AMINO (ALT) (SGPT): CPT | Performed by: PHYSICIAN ASSISTANT

## 2025-06-28 PROCEDURE — 99233 SBSQ HOSP IP/OBS HIGH 50: CPT | Mod: ,,, | Performed by: PSYCHIATRY & NEUROLOGY

## 2025-06-28 PROCEDURE — 84100 ASSAY OF PHOSPHORUS: CPT | Performed by: PHYSICIAN ASSISTANT

## 2025-06-28 RX ORDER — POTASSIUM CHLORIDE 20 MEQ/1
40 TABLET, EXTENDED RELEASE ORAL ONCE
Status: COMPLETED | OUTPATIENT
Start: 2025-06-28 | End: 2025-06-28

## 2025-06-28 RX ADMIN — SENNOSIDES AND DOCUSATE SODIUM 1 TABLET: 50; 8.6 TABLET ORAL at 09:06

## 2025-06-28 RX ADMIN — CLOPIDOGREL 75 MG: 75 TABLET ORAL at 09:06

## 2025-06-28 RX ADMIN — ACETAMINOPHEN 650 MG: 325 TABLET ORAL at 11:06

## 2025-06-28 RX ADMIN — METHOCARBAMOL 500 MG: 500 TABLET ORAL at 05:06

## 2025-06-28 RX ADMIN — LIDOCAINE 1 PATCH: 50 PATCH CUTANEOUS at 05:06

## 2025-06-28 RX ADMIN — POTASSIUM CHLORIDE 40 MEQ: 1500 TABLET, EXTENDED RELEASE ORAL at 04:06

## 2025-06-28 RX ADMIN — ATORVASTATIN CALCIUM 40 MG: 40 TABLET, FILM COATED ORAL at 09:06

## 2025-06-28 RX ADMIN — ASPIRIN 81 MG: 81 TABLET, COATED ORAL at 09:06

## 2025-06-28 RX ADMIN — METHOCARBAMOL 500 MG: 500 TABLET ORAL at 11:06

## 2025-06-28 RX ADMIN — ENOXAPARIN SODIUM 40 MG: 40 INJECTION SUBCUTANEOUS at 04:06

## 2025-06-28 NOTE — PROGRESS NOTES
"Adrian Verma - Neurosurgery ([facility])  Neurosurgery  Progress Note    Subjective:     History of Present Illness: Cadence Vasques is a 49 y.o. female transferred to [facility] for R M1 occlusion, presenting with L sided plegia, L FD, Right gaze preference and dysarthria. Family at bedside reports symptoms began Thursday of last week. They noticed the patient was slurring her words and stumbling. Patient is currently on aspirin/plavix. Urine tox positive for cocaine.     Post-Op Info:  * No surgery found *       Interval history: 6/28: neuro stable, CTH stable    Objective:     Weight: 60.5 kg (133 lb 6.1 oz)  Body mass index is 22.89 kg/m².  Vital Signs (Most Recent):  Temp: 97.8 °F (36.6 °C) (06/28/25 0439)  Pulse: 83 (06/28/25 0439)  Resp: 18 (06/28/25 0439)  BP: (!) 141/87 (06/28/25 0439)  SpO2: 98 % (06/28/25 0439) Vital Signs (24h Range):  Temp:  [97.8 °F (36.6 °C)-99 °F (37.2 °C)] 97.8 °F (36.6 °C)  Pulse:  [68-95] 83  Resp:  [16-28] 18  SpO2:  [95 %-100 %] 98 %  BP: (113-143)/(69-91) 141/87               Neurosurgery Physical Exam  General: Well developed, well nourished.  Head: Normocephalic.  GCS: Motor: 6/Verbal: 5/Eyes: 4 GCS Total: 15  Eyes: Pupils equal, round, reactive to light with accomodation, EOMI. Right gaze preference.  CN: Left sided facial droop.  Motor Strength: Moves right side full strength and to command. Left sided plegia.   Language/speech: Dysarthria.  Pulses: 2+ and symmetric radial and dorsalis pedis.  Skin: Skin is warm, dry and intact.    Significant Labs:  Recent Labs   Lab 06/27/25  0153 06/28/25  0242   GLU 87 88    138   K 4.3 3.4*    103   CO2 24 25   BUN 9 11   CREATININE 0.6 0.6   CALCIUM 9.3 9.1   MG 1.9 1.8     Recent Labs   Lab 06/27/25  0153 06/28/25  0242   WBC 8.59 7.63   HGB 14.9 13.5   HCT 44.4 40.2    302     No results for input(s): "LABPT", "INR", "APTT" in the last 48 hours.    Microbiology Results (last 7 days)       ** No results found for the " last 168 hours. **          All pertinent labs from the last 24 hours have been reviewed.    Significant Diagnostics:  I have reviewed and interpreted all pertinent imaging results/findings within the past 24 hours.              Assessment/Plan:     * Cerebrovascular accident (CVA) due to occlusion of right middle cerebral artery  Cadence Vasques is a 49 y.o. female transferred to [facility] for R M1 occlusion, presenting with L sided plegia, L FD, Right gaze preference and dysarthria. Family at bedside reports symptoms began Thursday of last week. They noticed the patient was slurring her words and stumbling. Patient is currently on aspirin/plavix. Urine tox positive for cocaine.     CTH 6/24: No midline shift. There is mild effacement of the right lateral ventricle frontal horn.  CTA 6/24: No LVO.  MRI Brain 6/25: R MCA infarct.  CTH 6/26 no shift , minimal stroke burden    - floor status  - q4h neurochecks.  - No acute neurosurgical intervention.   - no further imaging from [facility]  - NSGY will sign off     D/w Dr. Haywood.            Bernice Chau MD  Neurosurgery  Adrian Verma - Neurosurgery ([facility])

## 2025-06-28 NOTE — ASSESSMENT & PLAN NOTE
Cadence Vasques is a 49 y.o. female with PMH of HTN, tobacco use (0.5 PPD), substance abuse that presents as a transfer from [facility] for higher level of care of acute R MCA stroke.  She initially presented to [facility] ED with LSW, LFD, and dysarthria. LKW >24hrs prior, as family reported seeing her 3-4 days prior with facial droop and dysarthria. Telestroke NIHSS 12. CTH with hypoattenuation c/w recent infarcts, CTA revealed R M1 occlusion. Determined not a candidate for lytics or IR due to LKW being >24hrs prior. She was transferred to [facility] for further eval and stroke workup. MRI brain shows multifocal areas of moderate sized acute infarct involving the R MCA territory with localized mass effect but no MLS. She was evaluated by gen neuro at [facility], given risk for development of edema that would require neurosurgical intervention decision made to transfer patient to [facility] for admission to [facility] for higher level of care and close neuro monitoring.   On admit to [facility] neuro exam significant for LUE/LLE hemiplegia, severe LFD, mod-severe dysarthria, R gaze preference but is able to cross midline/track to L. NIHSS 13. NSGY consulted for hemicrani watch.    06/28/2025  NAEON. Improved alertness. K 3.4. Replacement ordered. Dispo planning ongoing. PT/OT recs HIT.       Antithrombotics for secondary stroke prevention: Antiplatelets: Aspirin: 81 mg daily  Clopidogrel: 75 mg daily    Statins for secondary stroke prevention and hyperlipidemia, if present: Statins: Atorvastatin- 40 mg daily    Aggressive risk factor modification: Smoking, Diet, Exercise     Rehab efforts: The patient has been evaluated by a stroke team provider and the therapy needs have been fully considered based off the presenting complaints and exam findings. The following therapy evaluations are needed: PT evaluate and treat, OT evaluate and treat, SLP evaluate and treat HIT    Diagnostics ordered/pending: None     VTE prophylaxis:  Heparin 5000 units SQ every 8 hours  Mechanical prophylaxis: Place SCDs    BP parameters: SBP <160

## 2025-06-28 NOTE — SUBJECTIVE & OBJECTIVE
"Interval history: 6/28: neuro stable, CTH stable    Objective:     Weight: 60.5 kg (133 lb 6.1 oz)  Body mass index is 22.89 kg/m².  Vital Signs (Most Recent):  Temp: 97.8 °F (36.6 °C) (06/28/25 0439)  Pulse: 83 (06/28/25 0439)  Resp: 18 (06/28/25 0439)  BP: (!) 141/87 (06/28/25 0439)  SpO2: 98 % (06/28/25 0439) Vital Signs (24h Range):  Temp:  [97.8 °F (36.6 °C)-99 °F (37.2 °C)] 97.8 °F (36.6 °C)  Pulse:  [68-95] 83  Resp:  [16-28] 18  SpO2:  [95 %-100 %] 98 %  BP: (113-143)/(69-91) 141/87               Neurosurgery Physical Exam  General: Well developed, well nourished.  Head: Normocephalic.  GCS: Motor: 6/Verbal: 5/Eyes: 4 GCS Total: 15  Eyes: Pupils equal, round, reactive to light with accomodation, EOMI. Right gaze preference.  CN: Left sided facial droop.  Motor Strength: Moves right side full strength and to command. Left sided plegia.   Language/speech: Dysarthria.  Pulses: 2+ and symmetric radial and dorsalis pedis.  Skin: Skin is warm, dry and intact.    Significant Labs:  Recent Labs   Lab 06/27/25  0153 06/28/25  0242   GLU 87 88    138   K 4.3 3.4*    103   CO2 24 25   BUN 9 11   CREATININE 0.6 0.6   CALCIUM 9.3 9.1   MG 1.9 1.8     Recent Labs   Lab 06/27/25  0153 06/28/25  0242   WBC 8.59 7.63   HGB 14.9 13.5   HCT 44.4 40.2    302     No results for input(s): "LABPT", "INR", "APTT" in the last 48 hours.    Microbiology Results (last 7 days)       ** No results found for the last 168 hours. **          All pertinent labs from the last 24 hours have been reviewed.    Significant Diagnostics:  I have reviewed and interpreted all pertinent imaging results/findings within the past 24 hours.              "

## 2025-06-28 NOTE — ASSESSMENT & PLAN NOTE
Cadence Vasques is a 49 y.o. female transferred to [facility] for R M1 occlusion, presenting with L sided plegia, L FD, Right gaze preference and dysarthria. Family at bedside reports symptoms began Thursday of last week. They noticed the patient was slurring her words and stumbling. Patient is currently on aspirin/plavix. Urine tox positive for cocaine.     CTH 6/24: No midline shift. There is mild effacement of the right lateral ventricle frontal horn.  CTA 6/24: No LVO.  MRI Brain 6/25: R MCA infarct.  CTH 6/26 no shift , minimal stroke burden    - floor status  - q4h neurochecks.  - No acute neurosurgical intervention.   - no further imaging from [facility]  - NSGY will sign off     D/w Dr. Haywood.

## 2025-06-28 NOTE — PLAN OF CARE
Problem: Adult Inpatient Plan of Care  Goal: Plan of Care Review  Outcome: Progressing  Goal: Patient-Specific Goal (Individualized)  Outcome: Progressing  Goal: Absence of Hospital-Acquired Illness or Injury  Outcome: Progressing  Goal: Optimal Comfort and Wellbeing  Outcome: Progressing  Goal: Readiness for Transition of Care  Outcome: Progressing     Problem: Skin Injury Risk Increased  Goal: Skin Health and Integrity  Outcome: Progressing     Problem: Stroke, Ischemic (Includes Transient Ischemic Attack)  Goal: Optimal Coping  Outcome: Progressing  Goal: Effective Bowel Elimination  Outcome: Progressing  Goal: Optimal Cerebral Tissue Perfusion  Outcome: Progressing  Goal: Optimal Cognitive Function  Outcome: Progressing  Goal: Improved Communication Skills  Outcome: Progressing  Goal: Optimal Functional Ability  Outcome: Progressing  Goal: Optimal Nutrition Intake  Outcome: Progressing  Goal: Effective Oxygenation and Ventilation  Outcome: Progressing  Goal: Improved Sensorimotor Function  Outcome: Progressing  Goal: Safe and Effective Swallow  Outcome: Progressing  Goal: Effective Urinary Elimination  Outcome: Progressing     Problem: Wound  Goal: Optimal Coping  Outcome: Progressing  Goal: Optimal Functional Ability  Outcome: Progressing  Goal: Absence of Infection Signs and Symptoms  Outcome: Progressing  Goal: Improved Oral Intake  Outcome: Progressing  Goal: Optimal Pain Control and Function  Outcome: Progressing  Goal: Skin Health and Integrity  Outcome: Progressing  Goal: Optimal Wound Healing  Outcome: Progressing     POC reviewed and updated with the patient at the bedside. Questions regarding POC were encouraged and addressed. VSS, see flowsheets. Tele maintained per provider's order.  Pt had CT head.  Patient is AOx 4 at this time. Fall and safety precautions maintained, no signs of injury noted during shift.  Upon exiting room, patient's bed locked in low position, side rails up x 3, bed alarm on,  with call light within reach. Instructed patient to call staff for mobility, verbalized understanding.  No acute signs of distress noted at this time.

## 2025-06-28 NOTE — ASSESSMENT & PLAN NOTE
-Stroke risk factor  -SBP goal <160, maintain MAP >65  -BP range in the last 24 hrs: BP  Min: 115/72  Max: 155/89  -Current antihypertensive regimen:   --PRN labetalol/hydralazine

## 2025-06-28 NOTE — PLAN OF CARE
Problem: Adult Inpatient Plan of Care  Goal: Plan of Care Review  Outcome: Progressing  Goal: Optimal Comfort and Wellbeing  Outcome: Progressing  Goal: Readiness for Transition of Care  Outcome: Progressing     Problem: Skin Injury Risk Increased  Goal: Skin Health and Integrity  Outcome: Progressing     Problem: Stroke, Ischemic (Includes Transient Ischemic Attack)  Goal: Optimal Coping  Outcome: Progressing  Goal: Improved Communication Skills  Outcome: Progressing  Goal: Optimal Functional Ability  Outcome: Progressing  Goal: Optimal Nutrition Intake  Outcome: Progressing  Goal: Safe and Effective Swallow  Outcome: Progressing  Goal: Effective Urinary Elimination  Outcome: Progressing    Stroke booklet @ bedside, rw w/pt.verbalized understanding

## 2025-06-28 NOTE — PROGRESS NOTES
Adrian Verma - Neurosurgery ([facility])  Vascular Neurology  Comprehensive Stroke Center  Progress Note    Assessment/Plan:     * Cerebrovascular accident (CVA) due to occlusion of right middle cerebral artery  Cadence Vasques is a 49 y.o. female with PMH of HTN, tobacco use (0.5 PPD), substance abuse that presents as a transfer from [facility] for higher level of care of acute R MCA stroke.  She initially presented to [facility] ED with LSW, LFD, and dysarthria. LKW >24hrs prior, as family reported seeing her 3-4 days prior with facial droop and dysarthria. Telestroke NIHSS 12. CTH with hypoattenuation c/w recent infarcts, CTA revealed R M1 occlusion. Determined not a candidate for lytics or IR due to LKW being >24hrs prior. She was transferred to [facility] for further eval and stroke workup. MRI brain shows multifocal areas of moderate sized acute infarct involving the R MCA territory with localized mass effect but no MLS. She was evaluated by gen neuro at [facility], given risk for development of edema that would require neurosurgical intervention decision made to transfer patient to [facility] for admission to [facility] for higher level of care and close neuro monitoring.   On admit to [facility] neuro exam significant for LUE/LLE hemiplegia, severe LFD, mod-severe dysarthria, R gaze preference but is able to cross midline/track to L. NIHSS 13. NSGY consulted for hemicrani watch.    06/28/2025  NAEON. Improved alertness. K 3.4. Replacement ordered. Dispo planning ongoing. PT/OT recs HIT.       Antithrombotics for secondary stroke prevention: Antiplatelets: Aspirin: 81 mg daily  Clopidogrel: 75 mg daily    Statins for secondary stroke prevention and hyperlipidemia, if present: Statins: Atorvastatin- 40 mg daily    Aggressive risk factor modification: Smoking, Diet, Exercise     Rehab efforts: The patient has been evaluated by a stroke team provider and the therapy needs have been fully considered based off the presenting  complaints and exam findings. The following therapy evaluations are needed: PT evaluate and treat, OT evaluate and treat, SLP evaluate and treat HIT    Diagnostics ordered/pending: None     VTE prophylaxis: Heparin 5000 units SQ every 8 hours  Mechanical prophylaxis: Place SCDs    BP parameters: SBP <160      Hypokalemia  -K 3.4, 6/28  -K clor 40 mEq ordered.     Essential hypertension  -Stroke risk factor  -SBP goal <160, maintain MAP >65  -BP range in the last 24 hrs: BP  Min: 115/72  Max: 155/89  -Current antihypertensive regimen:   --PRN labetalol/hydralazine      Cocaine use  -Stroke risk factor  -UDS + cocaine  - on cessation    Tobacco dependency  -Stroke risk factor  - on cessation  -Consider nicotine patch PRN  -Referral for smoking cessation at discharge if patient agreeable         06/26/2025  NAEON. CTH with similar distribution of recent right MCA distribution infarcts without evidence of hemorrhagic conversion or midline shift; No new major vascular distribution infarct. Neuro exam stable. Diet advanced to soft/bite sized. Continue to work with PT/OT.  06/27/2025 NAEON. Drowsy. Awakens to voice. LUE plegic. LLE strength improving. PT/OT recs HIT. Stepped down to NPU. Awaiting transfer.   06/28/2025 NAEON. Improved alertness. K 3.4. Replacement ordered. Dispo planning ongoing. PT/OT recs HIT.     STROKE DOCUMENTATION        NIH Scale:  1a. Level of Consciousness: 0-->Alert, keenly responsive  1b. LOC Questions: 0-->Answers both questions correctly  1c. LOC Commands: 0-->Performs both tasks correctly  2. Best Gaze: 0-->Normal  3. Visual: 0-->No visual loss  4. Facial Palsy: 2-->Partial paralysis (total or near-total paralysis of lower face)  5a. Motor Arm, Left: 4-->No movement  5b. Motor Arm, Right: 0-->No drift, limb holds 90 (or 45) degrees for full 10 secs  6a. Motor Leg, Left: 3-->No effort against gravity, leg falls to bed immediately  6b. Motor Leg, Right: 0-->No drift, leg holds 30  degree position for full 5 secs  7. Limb Ataxia: 0-->Absent  8. Sensory: 1-->Mild-to-moderate sensory loss, patient feels pinprick is less sharp or is dull on the affected side, or there is a loss of superficial pain with pinprick, but patient is aware of being touched  9. Best Language: 0-->No aphasia, normal  10. Dysarthria: 1-->Mild-to-moderate dysarthria, patient slurs at least some words and, at worst, can be understood with some difficulty  11. Extinction and Inattention (formerly Neglect): 0-->No abnormality  Total (NIH Stroke Scale): 11       Modified Raúl Score: 0  [place] Coma Scale:15   ABCD2 Score:    [id number] Score:   HAS -BLED Score:   ICH Score:   Hunt & Jose Classification:      Hemorrhagic change of an Ischemic Stroke: Does this patient have an ischemic stroke with hemorrhagic changes? No     Neurologic Chief Complaint: Cerebrovascular Accident due to occlusion of Right MCA    Subjective:     Interval History: Patient is seen for follow-up neurological assessment and treatment recommendations: See Hospital Course    HPI, Past Medical, Family, and Social History remains the same as documented in the initial encounter.     Review of Systems   Respiratory: Negative.     Cardiovascular: Negative.    Neurological:  Positive for weakness.     Scheduled Meds:   aspirin  81 mg Oral Daily    atorvastatin  40 mg Oral Daily    clopidogreL  75 mg Oral Daily    enoxparin  40 mg Subcutaneous Daily    LIDOcaine  1 patch Transdermal Q24H    methocarbamoL  500 mg Oral Q6H    potassium chloride  40 mEq Oral Once    senna-docusate  1 tablet Oral BID     Continuous Infusions:  PRN Meds:  Current Facility-Administered Medications:     acetaminophen, 650 mg, Oral, Q6H PRN    bisacodyL, 10 mg, Rectal, Daily PRN    labetalol, 10 mg, Intravenous, Q6H PRN    ondansetron, 4 mg, Intravenous, Q8H PRN    sodium chloride 0.9%, 10 mL, Intravenous, PRN    Objective:     Vital Signs (Most Recent):  Temp: 98.8 °F (37.1 °C)  "(06/28/25 1511)  Pulse: 72 (06/28/25 1511)  Resp: 18 (06/28/25 1511)  BP: 115/72 (06/28/25 1511)  SpO2: 96 % (06/28/25 1511)  BP Location: Left arm    Vital Signs Range (Last 24H):  Temp:  [97.8 °F (36.6 °C)-99 °F (37.2 °C)]   Pulse:  [61-88]   Resp:  [18]   BP: (115-155)/(72-89)   SpO2:  [95 %-100 %]   BP Location: Left arm       Physical Exam  Vitals and nursing note reviewed.   HENT:      Mouth/Throat:      Mouth: Mucous membranes are moist.   Eyes:      Pupils: Pupils are equal, round, and reactive to light.   Cardiovascular:      Rate and Rhythm: Normal rate.   Pulmonary:      Effort: No respiratory distress.   Skin:     General: Skin is warm and dry.   Neurological:      Mental Status: She is oriented to person, place, and time and easily aroused.              Neurological Exam:   LOC: drowsy  Attention Span: poor  Language: No aphasia  Articulation: Dysarthria  Orientation: Person, Place, Time   Facial Movement (CN VII): Lower facial weakness on the Left  Motor: Arm left  Plegia 0/5  Leg left  Paresis: 1/5  Arm right  Normal 5/5  Leg right Normal 5/5    Laboratory:  CMP:   Recent Labs   Lab 06/28/25  0242   CALCIUM 9.1   ALBUMIN 3.5   PROT 7.3      K 3.4*   CO2 25      BUN 11   CREATININE 0.6   ALKPHOS 70   ALT 6*   AST 12   BILITOT 0.4     BMP:   Recent Labs   Lab 06/28/25  0242      K 3.4*      CO2 25   BUN 11   CREATININE 0.6   CALCIUM 9.1     CBC:   Recent Labs   Lab 06/28/25  0242   WBC 7.63   RBC 4.36   HGB 13.5   HCT 40.2      MCV 92   MCH 31.0   MCHC 33.6     Lipid Panel:   Recent Labs   Lab 06/24/25  1855   CHOL 236*   LDLCALC 152.4   HDL 74   TRIG 48     Coagulation:   Recent Labs   Lab 06/25/25  0544   INR 1.1   APTT 26.1     Platelet Aggregation Study: No results for input(s): "PLTAGG", "PLTAGINTERP", "PLTAGREGLACO", "ADPPLTAGGREG" in the last 168 hours.  Hgb A1C:   Recent Labs   Lab 06/25/25  0544   HGBA1C 5.5     TSH:   Recent Labs   Lab 06/24/25  1855   TSH 0.347* "       Diagnostic Results   CT HEAD W/O CONTRAST 6/26/25  Impression:     Similar distribution of recent right MCA distribution infarcts without evidence of hemorrhagic conversion or midline shift.     No new major vascular distribution infarct.     8 mm partially calcified lesion with adjacent cyst formation in the region of the right choroid fissure/medial temporal lobe.  This is of unclear etiology and although other lesions are not excluded, a cavernous malformation to be considered.  MR imaging with contrast when the patient is stable would be helpful for further characterization.     Brain Imaging   MRI BRAIN W/O CONTRAST 6/25/25  Impression:     1. Motion compromised, prematurely terminated study.  2. Corresponding to findings on prior CT/CTA imaging of 06/24/2025, multifocal areas of expansile restricted diffusion T2 weighted signal abnormality are noted throughout the right MCA territory in keeping with acute to early subacute infarct.  No definite macroscopic hemorrhage or significant mass effect at the present time.  3. Diminutive flow related signal of right MCA branches within the sylvian fissure, in keeping with distal M1 occlusion demonstrated on prior CTA performed 06/24/2025.  Serpiginous FLAIR hyperintense signal in this region in keeping with slow and/or disorganized flow due to the proximal occlusion.     Vessel Imaging   CTA HEAD AND NECK 6/24/24  Impression:     1. No acute intracranial abnormality.  2. No large vessel occlusion.  3.  Partially imaged 3.8 cm circumscribed soft tissue density/ mass extending from the superior mediastinum to the right suprahilar region of unclear etiology.     % stenosis derived by comparing the narrowest segment with the distal luminal diameter as related to the reported measure of arterial narrowing.        Cardiac Imaging   ECHO 6/25/25    Left Ventricle: The left ventricle is normal in size. Normal wall thickness. There is normal systolic function. Quantitated  ejection fraction is 62%. There is normal diastolic function.    Right Ventricle: The right ventricle is normal in size measuring 3.4 cm. Wall thickness is normal. Systolic function is normal.    Aortic Valve: There is mild aortic regurgitation.    Mitral Valve: Mildly thickened anterior leaflet of undetermined significance.  Consider KAREN for better evaluation of the mitral valve in the setting of stroke    Pulmonary Artery: The estimated pulmonary artery systolic pressure is 17 mmHg.    IVC/SVC: Normal venous pressure at 3 mmHg.    Study is negative for shunt.       Carole Hernandez NP  [facility] Stroke [facility]  Department of Vascular Neurology   [facility])

## 2025-06-28 NOTE — SUBJECTIVE & OBJECTIVE
Neurologic Chief Complaint: Cerebrovascular Accident due to occlusion of Right MCA    Subjective:     Interval History: Patient is seen for follow-up neurological assessment and treatment recommendations: See Hospital Course    HPI, Past Medical, Family, and Social History remains the same as documented in the initial encounter.     Review of Systems   Respiratory: Negative.     Cardiovascular: Negative.    Neurological:  Positive for weakness.     Scheduled Meds:   aspirin  81 mg Oral Daily    atorvastatin  40 mg Oral Daily    clopidogreL  75 mg Oral Daily    enoxparin  40 mg Subcutaneous Daily    LIDOcaine  1 patch Transdermal Q24H    methocarbamoL  500 mg Oral Q6H    potassium chloride  40 mEq Oral Once    senna-docusate  1 tablet Oral BID     Continuous Infusions:  PRN Meds:  Current Facility-Administered Medications:     acetaminophen, 650 mg, Oral, Q6H PRN    bisacodyL, 10 mg, Rectal, Daily PRN    labetalol, 10 mg, Intravenous, Q6H PRN    ondansetron, 4 mg, Intravenous, Q8H PRN    sodium chloride 0.9%, 10 mL, Intravenous, PRN    Objective:     Vital Signs (Most Recent):  Temp: 98.8 °F (37.1 °C) (06/28/25 1511)  Pulse: 72 (06/28/25 1511)  Resp: 18 (06/28/25 1511)  BP: 115/72 (06/28/25 1511)  SpO2: 96 % (06/28/25 1511)  BP Location: Left arm    Vital Signs Range (Last 24H):  Temp:  [97.8 °F (36.6 °C)-99 °F (37.2 °C)]   Pulse:  [61-88]   Resp:  [18]   BP: (115-155)/(72-89)   SpO2:  [95 %-100 %]   BP Location: Left arm       Physical Exam  Vitals and nursing note reviewed.   HENT:      Mouth/Throat:      Mouth: Mucous membranes are moist.   Eyes:      Pupils: Pupils are equal, round, and reactive to light.   Cardiovascular:      Rate and Rhythm: Normal rate.   Pulmonary:      Effort: No respiratory distress.   Skin:     General: Skin is warm and dry.   Neurological:      Mental Status: She is oriented to person, place, and time and easily aroused.              Neurological Exam:   LOC: drowsy  Attention Span:  "poor  Language: No aphasia  Articulation: Dysarthria  Orientation: Person, Place, Time   Facial Movement (CN VII): Lower facial weakness on the Left  Motor: Arm left  Plegia 0/5  Leg left  Paresis: 1/5  Arm right  Normal 5/5  Leg right Normal 5/5    Laboratory:  CMP:   Recent Labs   Lab 06/28/25  0242   CALCIUM 9.1   ALBUMIN 3.5   PROT 7.3      K 3.4*   CO2 25      BUN 11   CREATININE 0.6   ALKPHOS 70   ALT 6*   AST 12   BILITOT 0.4     BMP:   Recent Labs   Lab 06/28/25  0242      K 3.4*      CO2 25   BUN 11   CREATININE 0.6   CALCIUM 9.1     CBC:   Recent Labs   Lab 06/28/25  0242   WBC 7.63   RBC 4.36   HGB 13.5   HCT 40.2      MCV 92   MCH 31.0   MCHC 33.6     Lipid Panel:   Recent Labs   Lab 06/24/25  1855   CHOL 236*   LDLCALC 152.4   HDL 74   TRIG 48     Coagulation:   Recent Labs   Lab 06/25/25  0544   INR 1.1   APTT 26.1     Platelet Aggregation Study: No results for input(s): "PLTAGG", "PLTAGINTERP", "PLTAGREGLACO", "ADPPLTAGGREG" in the last 168 hours.  Hgb A1C:   Recent Labs   Lab 06/25/25  0544   HGBA1C 5.5     TSH:   Recent Labs   Lab 06/24/25  1855   TSH 0.347*       Diagnostic Results   CT HEAD W/O CONTRAST 6/26/25  Impression:     Similar distribution of recent right MCA distribution infarcts without evidence of hemorrhagic conversion or midline shift.     No new major vascular distribution infarct.     8 mm partially calcified lesion with adjacent cyst formation in the region of the right choroid fissure/medial temporal lobe.  This is of unclear etiology and although other lesions are not excluded, a cavernous malformation to be considered.  MR imaging with contrast when the patient is stable would be helpful for further characterization.     Brain Imaging   MRI BRAIN W/O CONTRAST 6/25/25  Impression:     1. Motion compromised, prematurely terminated study.  2. Corresponding to findings on prior CT/CTA imaging of 06/24/2025, multifocal areas of expansile restricted " diffusion T2 weighted signal abnormality are noted throughout the right MCA territory in keeping with acute to early subacute infarct.  No definite macroscopic hemorrhage or significant mass effect at the present time.  3. Diminutive flow related signal of right MCA branches within the sylvian fissure, in keeping with distal M1 occlusion demonstrated on prior CTA performed 06/24/2025.  Serpiginous FLAIR hyperintense signal in this region in keeping with slow and/or disorganized flow due to the proximal occlusion.     Vessel Imaging   CTA HEAD AND NECK 6/24/24  Impression:     1. No acute intracranial abnormality.  2. No large vessel occlusion.  3.  Partially imaged 3.8 cm circumscribed soft tissue density/ mass extending from the superior mediastinum to the right suprahilar region of unclear etiology.     % stenosis derived by comparing the narrowest segment with the distal luminal diameter as related to the reported measure of arterial narrowing.        Cardiac Imaging   ECHO 6/25/25    Left Ventricle: The left ventricle is normal in size. Normal wall thickness. There is normal systolic function. Quantitated ejection fraction is 62%. There is normal diastolic function.    Right Ventricle: The right ventricle is normal in size measuring 3.4 cm. Wall thickness is normal. Systolic function is normal.    Aortic Valve: There is mild aortic regurgitation.    Mitral Valve: Mildly thickened anterior leaflet of undetermined significance.  Consider KAREN for better evaluation of the mitral valve in the setting of stroke    Pulmonary Artery: The estimated pulmonary artery systolic pressure is 17 mmHg.    IVC/SVC: Normal venous pressure at 3 mmHg.    Study is negative for shunt.

## 2025-06-29 LAB
ALBUMIN SERPL BCP-MCNC: 3.6 G/DL (ref 3.5–5.2)
ALP SERPL-CCNC: 70 UNIT/L (ref 40–150)
ALT SERPL W/O P-5'-P-CCNC: 7 UNIT/L (ref 10–44)
ANION GAP (OHS): 9 MMOL/L (ref 8–16)
AST SERPL-CCNC: 12 UNIT/L (ref 11–45)
BILIRUB SERPL-MCNC: 0.3 MG/DL (ref 0.1–1)
BUN SERPL-MCNC: 14 MG/DL (ref 6–20)
CALCIUM SERPL-MCNC: 9.2 MG/DL (ref 8.7–10.5)
CHLORIDE SERPL-SCNC: 104 MMOL/L (ref 95–110)
CO2 SERPL-SCNC: 24 MMOL/L (ref 23–29)
CREAT SERPL-MCNC: 0.6 MG/DL (ref 0.5–1.4)
ERYTHROCYTE [DISTWIDTH] IN BLOOD BY AUTOMATED COUNT: 13.2 % (ref 11.5–14.5)
GFR SERPLBLD CREATININE-BSD FMLA CKD-EPI: >60 ML/MIN/1.73/M2
GLUCOSE SERPL-MCNC: 100 MG/DL (ref 70–110)
HCT VFR BLD AUTO: 40.7 % (ref 37–48.5)
HGB BLD-MCNC: 13.9 GM/DL (ref 12–16)
MCH RBC QN AUTO: 31 PG (ref 27–31)
MCHC RBC AUTO-ENTMCNC: 34.2 G/DL (ref 32–36)
MCV RBC AUTO: 91 FL (ref 82–98)
NUCLEATED RBC (/100WBC) (OHS): 0 /100 WBC
PLATELET # BLD AUTO: 318 K/UL (ref 150–450)
PMV BLD AUTO: 10.4 FL (ref 9.2–12.9)
POTASSIUM SERPL-SCNC: 4.1 MMOL/L (ref 3.5–5.1)
PROT SERPL-MCNC: 7.5 GM/DL (ref 6–8.4)
RBC # BLD AUTO: 4.49 M/UL (ref 4–5.4)
SODIUM SERPL-SCNC: 137 MMOL/L (ref 136–145)
WBC # BLD AUTO: 5.61 K/UL (ref 3.9–12.7)

## 2025-06-29 PROCEDURE — 63600175 PHARM REV CODE 636 W HCPCS: Performed by: PHYSICIAN ASSISTANT

## 2025-06-29 PROCEDURE — 99233 SBSQ HOSP IP/OBS HIGH 50: CPT | Mod: ,,, | Performed by: PSYCHIATRY & NEUROLOGY

## 2025-06-29 PROCEDURE — 97116 GAIT TRAINING THERAPY: CPT | Mod: CQ

## 2025-06-29 PROCEDURE — 85025 COMPLETE CBC W/AUTO DIFF WBC: CPT

## 2025-06-29 PROCEDURE — 80053 COMPREHEN METABOLIC PANEL: CPT

## 2025-06-29 PROCEDURE — 97530 THERAPEUTIC ACTIVITIES: CPT | Mod: CQ

## 2025-06-29 PROCEDURE — 11000001 HC ACUTE MED/SURG PRIVATE ROOM

## 2025-06-29 PROCEDURE — 25000003 PHARM REV CODE 250: Performed by: PHYSICIAN ASSISTANT

## 2025-06-29 PROCEDURE — 36415 COLL VENOUS BLD VENIPUNCTURE: CPT

## 2025-06-29 RX ADMIN — ATORVASTATIN CALCIUM 40 MG: 40 TABLET, FILM COATED ORAL at 09:06

## 2025-06-29 RX ADMIN — CLOPIDOGREL 75 MG: 75 TABLET ORAL at 09:06

## 2025-06-29 RX ADMIN — METHOCARBAMOL 500 MG: 500 TABLET ORAL at 12:06

## 2025-06-29 RX ADMIN — METHOCARBAMOL 500 MG: 500 TABLET ORAL at 11:06

## 2025-06-29 RX ADMIN — SENNOSIDES AND DOCUSATE SODIUM 1 TABLET: 50; 8.6 TABLET ORAL at 09:06

## 2025-06-29 RX ADMIN — METHOCARBAMOL 500 MG: 500 TABLET ORAL at 05:06

## 2025-06-29 RX ADMIN — LIDOCAINE 1 PATCH: 50 PATCH CUTANEOUS at 05:06

## 2025-06-29 RX ADMIN — ENOXAPARIN SODIUM 40 MG: 40 INJECTION SUBCUTANEOUS at 05:06

## 2025-06-29 RX ADMIN — ASPIRIN 81 MG: 81 TABLET, COATED ORAL at 09:06

## 2025-06-29 NOTE — PLAN OF CARE
Problem: Adult Inpatient Plan of Care  Goal: Plan of Care Review  Outcome: Progressing  Goal: Patient-Specific Goal (Individualized)  Outcome: Progressing  Goal: Absence of Hospital-Acquired Illness or Injury  Outcome: Progressing  Goal: Optimal Comfort and Wellbeing  Outcome: Progressing  Goal: Readiness for Transition of Care  Outcome: Progressing     Problem: Skin Injury Risk Increased  Goal: Skin Health and Integrity  Outcome: Progressing     Problem: Stroke, Ischemic (Includes Transient Ischemic Attack)  Goal: Optimal Coping  Outcome: Progressing  Goal: Effective Bowel Elimination  Outcome: Progressing  Goal: Optimal Cerebral Tissue Perfusion  Outcome: Progressing  Goal: Optimal Cognitive Function  Outcome: Progressing  Goal: Improved Communication Skills  Outcome: Progressing  Goal: Optimal Functional Ability  Outcome: Progressing  Goal: Optimal Nutrition Intake  Outcome: Progressing  Goal: Effective Oxygenation and Ventilation  Outcome: Progressing  Goal: Improved Sensorimotor Function  Outcome: Progressing  Goal: Safe and Effective Swallow  Outcome: Progressing  Goal: Effective Urinary Elimination  Outcome: Progressing     Problem: Wound  Goal: Optimal Coping  Outcome: Progressing  Goal: Optimal Functional Ability  Outcome: Progressing  Goal: Absence of Infection Signs and Symptoms  Outcome: Progressing  Goal: Improved Oral Intake  Outcome: Progressing  Goal: Optimal Pain Control and Function  Outcome: Progressing  Goal: Skin Health and Integrity  Outcome: Progressing  Goal: Optimal Wound Healing  Outcome: Progressing     POC reviewed and updated with the patient at the bedside. Questions regarding POC were encouraged and addressed. VSS, see flowsheets. Tele maintained per provider's order.  NAEON.  Patient is AOx 4 at this time. Fall and safety precautions maintained, no signs of injury noted during shift. Upon exiting room, patient's bed locked in low position, side rails up x 2, bed alarm on, with call  light within reach. Instructed patient to call staff for mobility, verbalized understanding.  No acute signs of distress noted at this time.

## 2025-06-29 NOTE — ASSESSMENT & PLAN NOTE
Cadence Vasques is a 49 y.o. female with PMH of HTN, tobacco use (0.5 PPD), substance abuse that presents as a transfer from [facility] for higher level of care of acute R MCA stroke.  She initially presented to [facility] ED with LSW, LFD, and dysarthria. LKW >24hrs prior, as family reported seeing her 3-4 days prior with facial droop and dysarthria. Telestroke NIHSS 12. CTH with hypoattenuation c/w recent infarcts, CTA revealed R M1 occlusion. Determined not a candidate for lytics or IR due to LKW being >24hrs prior. She was transferred to [facility] for further eval and stroke workup. MRI brain shows multifocal areas of moderate sized acute infarct involving the R MCA territory with localized mass effect but no MLS. She was evaluated by gen neuro at [facility], given risk for development of edema that would require neurosurgical intervention decision made to transfer patient to [facility] for admission to [facility] for higher level of care and close neuro monitoring.   On admit to [facility] neuro exam significant for LUE/LLE hemiplegia, severe LFD, mod-severe dysarthria, R gaze preference but is able to cross midline/track to L. NIHSS 13. NSGY consulted for hemicrani watch.    06/29/2025  NAEON. Awakens to voice. Neuro exam stable. Dispo planning ongoing.      Antithrombotics for secondary stroke prevention: Antiplatelets: Aspirin: 81 mg daily  Clopidogrel: 75 mg daily    Statins for secondary stroke prevention and hyperlipidemia, if present: Statins: Atorvastatin- 40 mg daily    Aggressive risk factor modification: Smoking, Diet, Exercise     Rehab efforts: The patient has been evaluated by a stroke team provider and the therapy needs have been fully considered based off the presenting complaints and exam findings. The following therapy evaluations are needed: PT evaluate and treat, OT evaluate and treat, SLP evaluate and treat HIT    Diagnostics ordered/pending: None     VTE prophylaxis: Heparin 5000 units SQ every 8  hours  Mechanical prophylaxis: Place SCDs    BP parameters: SBP <160

## 2025-06-29 NOTE — PT/OT/SLP PROGRESS
"Physical Therapy Treatment    Patient Name:  Cadence Vasques   MRN:  [id number]    Recommendations:     Discharge Recommendations: High Intensity Therapy  Discharge Equipment Recommendations: bedside commode, wheelchair  Barriers to discharge: Increased level of assistance required     Assessment:     Cadence Vasques is a 49 y.o. female admitted with a medical diagnosis of Cerebrovascular accident (CVA) due to occlusion of right middle cerebral artery.  She presents with the following impairments/functional limitations: weakness, impaired sensation, impaired self care skills, impaired functional mobility, gait instability, visual deficits, impaired balance, impaired cognition, decreased coordination, decreased upper extremity function, decreased lower extremity function, decreased safety awareness, pain, impaired coordination, impaired fine motor.    Pt met with HOB elevated and motivated for session. Pt tolerates session well with emphasis on bed mobility, transfers, and gait training. Pt able to take steps forward and backward this session with max A x2 persons. Assistance required with weight shifting and advancement of LLE, RLE with ataxic like movement presenting with excessively large, uncontrolled steps forward. Pt will continue to benefit from skilled therapy services.    Rehab Prognosis: Good; patient would benefit from acute skilled PT services to address these deficits and reach maximum level of function.    Recent Surgery: * No surgery found *      Plan:     During this hospitalization, patient to be seen 5 x/week to address the identified rehab impairments via gait training, therapeutic activities, therapeutic exercises, neuromuscular re-education, wheelchair management/training and progress toward the following goals:    Plan of Care Expires:  07/04/25    Subjective     Chief Complaint: none stated  Patient/Family Comments/goals: "I have to get better, my baby wants to throw me a party so I " "can do the stanky leg"   Pain/Comfort:  Pain Rating 1: 0/10  Pain Rating Post-Intervention 1: 0/10      Objective:   Communicated with RN (Chelle) prior to session.  Patient found HOB elevated with telemetry, PureWick, bed alarm, pressure relief boots, SCD upon PTA entry to room.     General Precautions: Standard, fall, aspiration  Orthopedic Precautions: N/A  Braces: N/A  Respiratory Status: Room air     Functional Mobility:  Bed Mobility:     Scooting: anteriorly to EOB contact guard assistance  Supine to Sit: minimum assistance for trunk  Verbal cueing required to utilized RLE to assist LLE out of bed.   Sit to Supine: moderate assistance for Trunk/BLEs  Verbal cueing required to utilized RLE to assist LLE back into bed  Transfers:     Sit to Stand:  x2 trials moderate assistance with hand-held assist  Gait:   Pt ambulates x3 steps forward/3 steps backward  and x3 L lateral steps with maximal assistance x2 persons, BHHA, and BLE blocked  Deviations noted: flexed posture, unsteady gait, decreased weight shift, L knee instability, L decreased step length/height, RLE ataxia  Verbal and tactile cues required for upright posture, gluteal activation, scapular retraction, assistance provided with weight shift R<>L and advancing LLE, verbal cues required for sequence of steps and weight shift  All lines remained intact and gait belt utilized.    AM-PAC 6 CLICK MOBILITY  Turning over in bed (including adjusting bedclothes, sheets and blankets)?: 3  Sitting down on and standing up from a chair with arms (e.g., wheelchair, bedside commode, etc.): 2  Moving from lying on back to sitting on the side of the bed?: 3  Moving to and from a bed to a chair (including a wheelchair)?: 2  Need to walk in hospital room?: 1  Climbing 3-5 steps with a railing?: 1  Basic Mobility Total Score: 12       Treatment & Education:  Patient provided with daily orientation and goals of this PT session.     Activities completed at EOB:  X10 AP BLE " PROM   X10 LAQ, hip flexion RLE AROM, LLE AAROM/pt utilizing RLE or hands to assist movement   PTA provided skilled instruction  for good form and technique for slow, controlled repetitions.     Pt educated to call for assistance and to transfer with hospital staff only.  Also, pt was educated on the effects of prolonged immobility and the importance of performing OOB activity and exercises to promote healing and reduce recovery time.    Patient left HOB elevated with all lines intact, call button in reach, bed alarm on, RN notified, and SCDs.    GOALS:   Multidisciplinary Problems       Physical Therapy Goals          Problem: Physical Therapy    Goal Priority Disciplines Outcome Interventions   Physical Therapy Goal     PT, PT/OT Progressing    Description: Goals to be completed by: 7/4/25    Pt will perform sup<>sit transfers w/ minimum assistance  Pt will be able to stand up from EOB w/ minimum assistance using LRAD  Pt will ambulate 10 feet w/ moderate assistance using LRAD  Transfer from bed to chair w/ modA using LRAD  Propel w/c 50' w/ CGA   Pt will be independent w/ HEP therex on BLE w/ good form and ROM                        DME Justifications:   Cadence requires a commode for home use because she is confined to a single room.  Cadence Vasques has a mobility limitation that significantly impairs her ability to participate in one or more mobility related activities of daily living (MRADLs) such as toileting, feeding, dressing, grooming, and bathing in customary locations in the home.  The mobility limitation cannot be sufficiently resolved by the use of a cane or walker.   Patients upper body strength is not sufficient to propel a standard WC. The use of a lightweight wheelchair will significantly improve the patients ability to participate in MRADLS and the patient will use it on regular basis in the home.   She has a caregiver who is available, willing, and able to provide assistance with the  wheelchair when needed.     Time Tracking:     PT Received On: 06/29/25  PT Start Time: 1142     PT Stop Time: 1205  PT Total Time (min): 23 min     Billable Minutes: Gait Training 15 and Therapeutic Activity 8    Treatment Type: Treatment  PT/PTA: PTA     Number of PTA visits since last PT visit: 1 06/29/2025

## 2025-06-29 NOTE — PROGRESS NOTES
Adrian Verma - Neurosurgery ([facility])  Vascular Neurology  Comprehensive Stroke Center  Progress Note    Assessment/Plan:     * Cerebrovascular accident (CVA) due to occlusion of right middle cerebral artery  Cadence Vasques is a 49 y.o. female with PMH of HTN, tobacco use (0.5 PPD), substance abuse that presents as a transfer from [facility] for higher level of care of acute R MCA stroke.  She initially presented to [facility] ED with LSW, LFD, and dysarthria. LKW >24hrs prior, as family reported seeing her 3-4 days prior with facial droop and dysarthria. Telestroke NIHSS 12. CTH with hypoattenuation c/w recent infarcts, CTA revealed R M1 occlusion. Determined not a candidate for lytics or IR due to LKW being >24hrs prior. She was transferred to [facility] for further eval and stroke workup. MRI brain shows multifocal areas of moderate sized acute infarct involving the R MCA territory with localized mass effect but no MLS. She was evaluated by gen neuro at [facility], given risk for development of edema that would require neurosurgical intervention decision made to transfer patient to [facility] for admission to [facility] for higher level of care and close neuro monitoring.   On admit to [facility] neuro exam significant for LUE/LLE hemiplegia, severe LFD, mod-severe dysarthria, R gaze preference but is able to cross midline/track to L. NIHSS 13. NSGY consulted for hemicrani watch.    06/29/2025  NAEON. Awakens to voice. Neuro exam stable. Dispo planning ongoing.      Antithrombotics for secondary stroke prevention: Antiplatelets: Aspirin: 81 mg daily  Clopidogrel: 75 mg daily    Statins for secondary stroke prevention and hyperlipidemia, if present: Statins: Atorvastatin- 40 mg daily    Aggressive risk factor modification: Smoking, Diet, Exercise     Rehab efforts: The patient has been evaluated by a stroke team provider and the therapy needs have been fully considered based off the presenting complaints and exam findings.  The following therapy evaluations are needed: PT evaluate and treat, OT evaluate and treat, SLP evaluate and treat HIT    Diagnostics ordered/pending: None     VTE prophylaxis: Heparin 5000 units SQ every 8 hours  Mechanical prophylaxis: Place SCDs    BP parameters: SBP <160      Hypokalemia  -K 3.4, 6/28  -labs pending collection    Essential hypertension  -Stroke risk factor  -SBP goal <160, maintain MAP >65  -BP range in the last 24 hrs: BP  Min: 111/76  Max: 137/92  -Current antihypertensive regimen:   --PRN labetalol/hydralazine      Cocaine use  -Stroke risk factor  -UDS + cocaine  - on cessation    Tobacco dependency  -Stroke risk factor  - on cessation  -Consider nicotine patch PRN  -Referral for smoking cessation at discharge if patient agreeable         06/26/2025  NAEON. CTH with similar distribution of recent right MCA distribution infarcts without evidence of hemorrhagic conversion or midline shift; No new major vascular distribution infarct. Neuro exam stable. Diet advanced to soft/bite sized. Continue to work with PT/OT.  06/27/2025 NAEON. Drowsy. Awakens to voice. LUE plegic. LLE strength improving. PT/OT recs HIT. Stepped down to NPU. Awaiting transfer.   06/28/2025 NAEON. Improved alertness. K 3.4. Replacement ordered. Dispo planning ongoing. PT/OT recs HIT.   06/29/2025 NAEON. Awakens to voice. Neuro exam stable. Dispo planning ongoing.    STROKE DOCUMENTATION        NIH Scale:  1a. Level of Consciousness: 0-->Alert, keenly responsive  1b. LOC Questions: 0-->Answers both questions correctly  1c. LOC Commands: 0-->Performs both tasks correctly  2. Best Gaze: 0-->Normal  3. Visual: 0-->No visual loss  4. Facial Palsy: 2-->Partial paralysis (total or near-total paralysis of lower face)  5a. Motor Arm, Left: 4-->No movement  5b. Motor Arm, Right: 0-->No drift, limb holds 90 (or 45) degrees for full 10 secs  6a. Motor Leg, Left: 3-->No effort against gravity, leg falls to bed  immediately  6b. Motor Leg, Right: 0-->No drift, leg holds 30 degree position for full 5 secs  7. Limb Ataxia: 0-->Absent  8. Sensory: 1-->Mild-to-moderate sensory loss, patient feels pinprick is less sharp or is dull on the affected side, or there is a loss of superficial pain with pinprick, but patient is aware of being touched  9. Best Language: 0-->No aphasia, normal  10. Dysarthria: 1-->Mild-to-moderate dysarthria, patient slurs at least some words and, at worst, can be understood with some difficulty  11. Extinction and Inattention (formerly Neglect): 0-->No abnormality  Total (NIH Stroke Scale): 11       Modified Cloud Score: 0  Houston Coma Scale:    ABCD2 Score:    [id number] Score:   HAS -BLED Score:   ICH Score:   Hunt & Jose Classification:      Hemorrhagic change of an Ischemic Stroke: Does this patient have an ischemic stroke with hemorrhagic changes? No     Neurologic Chief Complaint: R MCA stroke    Subjective:     Interval History: Patient is seen for follow-up neurological assessment and treatment recommendations: See hospital course.     HPI, Past Medical, Family, and Social History remains the same as documented in the initial encounter.     Review of Systems   Neurological:  Positive for facial asymmetry, speech difficulty and weakness.     Scheduled Meds:   aspirin  81 mg Oral Daily    atorvastatin  40 mg Oral Daily    clopidogreL  75 mg Oral Daily    enoxparin  40 mg Subcutaneous Daily    LIDOcaine  1 patch Transdermal Q24H    methocarbamoL  500 mg Oral Q6H    senna-docusate  1 tablet Oral BID     Continuous Infusions:  PRN Meds:  Current Facility-Administered Medications:     acetaminophen, 650 mg, Oral, Q6H PRN    bisacodyL, 10 mg, Rectal, Daily PRN    labetalol, 10 mg, Intravenous, Q6H PRN    ondansetron, 4 mg, Intravenous, Q8H PRN    sodium chloride 0.9%, 10 mL, Intravenous, PRN    Objective:     Vital Signs (Most Recent):  Temp: 98.6 °F (37 °C) (06/29/25 0732)  Pulse: 81 (06/29/25  "0732)  Resp: 18 (06/29/25 0732)  BP: 111/76 (06/29/25 0732)  SpO2: (!) 91 % (06/29/25 0732)  BP Location: Left arm    Vital Signs Range (Last 24H):  Temp:  [97.8 °F (36.6 °C)-99.3 °F (37.4 °C)]   Pulse:  [59-81]   Resp:  [18]   BP: (111-137)/(72-92)   SpO2:  [91 %-98 %]   BP Location: Left arm       Physical Exam  HENT:      Head: Normocephalic and atraumatic.      Mouth/Throat:      Mouth: Mucous membranes are moist.   Eyes:      Extraocular Movements: Extraocular movements intact.      Conjunctiva/sclera: Conjunctivae normal.   Cardiovascular:      Rate and Rhythm: Normal rate.   Pulmonary:      Effort: Pulmonary effort is normal.   Skin:     General: Skin is warm and dry.   Neurological:      Mental Status: She is alert and oriented to person, place, and time.      Motor: Weakness present.              Neurological Exam:   LOC: alert  Attention Span: Good   Language: No aphasia  Articulation: Dysarthria  Orientation: Person, Place, Time   EOM (CN III, IV, VI): Full/intact  Facial Movement (CN VII): Lower facial weakness on the Left  Motor: Arm left  Plegia 0/5  Leg left  Paresis: 1/5  Arm right  Normal 5/5  Leg right Normal 5/5    Laboratory:  CMP: No results for input(s): "GLUCOSE", "CALCIUM", "ALBUMIN", "PROT", "NA", "K", "CO2", "CL", "BUN", "CREATININE", "ALKPHOS", "ALT", "AST", "BILITOT" in the last 24 hours.  BMP:   Recent Labs   Lab 06/28/25  0242      K 3.4*      CO2 25   BUN 11   CREATININE 0.6   CALCIUM 9.1     CBC:   Recent Labs   Lab 06/28/25  0242   WBC 7.63   RBC 4.36   HGB 13.5   HCT 40.2      MCV 92   MCH 31.0   MCHC 33.6     Lipid Panel:   Recent Labs   Lab 06/24/25  1855   CHOL 236*   LDLCALC 152.4   HDL 74   TRIG 48     Coagulation:   Recent Labs   Lab 06/25/25  0544   INR 1.1   APTT 26.1     Platelet Aggregation Study: No results for input(s): "PLTAGG", "PLTAGINTERP", "PLTAGREGLACO", "ADPPLTAGGREG" in the last 168 hours.  Hgb A1C:   Recent Labs   Lab 06/25/25  0544   HGBA1C " 5.5     TSH:   Recent Labs   Lab 06/24/25  1855   TSH 0.347*       Diagnostic Results      Brain Imaging   MRI BRAIN W/O CONTRAST 6/25/25  Impression:     1. Motion compromised, prematurely terminated study.  2. Corresponding to findings on prior CT/CTA imaging of 06/24/2025, multifocal areas of expansile restricted diffusion T2 weighted signal abnormality are noted throughout the right MCA territory in keeping with acute to early subacute infarct.  No definite macroscopic hemorrhage or significant mass effect at the present time.  3. Diminutive flow related signal of right MCA branches within the sylvian fissure, in keeping with distal M1 occlusion demonstrated on prior CTA performed 06/24/2025.  Serpiginous FLAIR hyperintense signal in this region in keeping with slow and/or disorganized flow due to the proximal occlusion.    CT HEAD W/O CONTRAST 6/26/25  Impression:     Similar distribution of recent right MCA distribution infarcts without evidence of hemorrhagic conversion or midline shift.     No new major vascular distribution infarct.     8 mm partially calcified lesion with adjacent cyst formation in the region of the right choroid fissure/medial temporal lobe.  This is of unclear etiology and although other lesions are not excluded, a cavernous malformation to be considered.  MR imaging with contrast when the patient is stable would be helpful for further characterization.     Vessel Imaging   CTA HEAD AND NECK 6/24/24  Impression:     1. No acute intracranial abnormality.  2. No large vessel occlusion.  3.  Partially imaged 3.8 cm circumscribed soft tissue density/ mass extending from the superior mediastinum to the right suprahilar region of unclear etiology.     % stenosis derived by comparing the narrowest segment with the distal luminal diameter as related to the reported measure of arterial narrowing.        Cardiac Imaging   ECHO 6/25/25    Left Ventricle: The left ventricle is normal in size. Normal  wall thickness. There is normal systolic function. Quantitated ejection fraction is 62%. There is normal diastolic function.    Right Ventricle: The right ventricle is normal in size measuring 3.4 cm. Wall thickness is normal. Systolic function is normal.    Aortic Valve: There is mild aortic regurgitation.    Mitral Valve: Mildly thickened anterior leaflet of undetermined significance.  Consider KAREN for better evaluation of the mitral valve in the setting of stroke    Pulmonary Artery: The estimated pulmonary artery systolic pressure is 17 mmHg.    IVC/SVC: Normal venous pressure at 3 mmHg.    Study is negative for shunt.       Kimberlyn Valdes PA-C  Comprehensive Stroke Center  Department of Vascular Neurology   [facility])

## 2025-06-29 NOTE — SUBJECTIVE & OBJECTIVE
Neurologic Chief Complaint: R MCA stroke    Subjective:     Interval History: Patient is seen for follow-up neurological assessment and treatment recommendations: See hospital course.     HPI, Past Medical, Family, and Social History remains the same as documented in the initial encounter.     Review of Systems   Neurological:  Positive for facial asymmetry, speech difficulty and weakness.     Scheduled Meds:   aspirin  81 mg Oral Daily    atorvastatin  40 mg Oral Daily    clopidogreL  75 mg Oral Daily    enoxparin  40 mg Subcutaneous Daily    LIDOcaine  1 patch Transdermal Q24H    methocarbamoL  500 mg Oral Q6H    senna-docusate  1 tablet Oral BID     Continuous Infusions:  PRN Meds:  Current Facility-Administered Medications:     acetaminophen, 650 mg, Oral, Q6H PRN    bisacodyL, 10 mg, Rectal, Daily PRN    labetalol, 10 mg, Intravenous, Q6H PRN    ondansetron, 4 mg, Intravenous, Q8H PRN    sodium chloride 0.9%, 10 mL, Intravenous, PRN    Objective:     Vital Signs (Most Recent):  Temp: 98.6 °F (37 °C) (06/29/25 0732)  Pulse: 81 (06/29/25 0732)  Resp: 18 (06/29/25 0732)  BP: 111/76 (06/29/25 0732)  SpO2: (!) 91 % (06/29/25 0732)  BP Location: Left arm    Vital Signs Range (Last 24H):  Temp:  [97.8 °F (36.6 °C)-99.3 °F (37.4 °C)]   Pulse:  [59-81]   Resp:  [18]   BP: (111-137)/(72-92)   SpO2:  [91 %-98 %]   BP Location: Left arm       Physical Exam  HENT:      Head: Normocephalic and atraumatic.      Mouth/Throat:      Mouth: Mucous membranes are moist.   Eyes:      Extraocular Movements: Extraocular movements intact.      Conjunctiva/sclera: Conjunctivae normal.   Cardiovascular:      Rate and Rhythm: Normal rate.   Pulmonary:      Effort: Pulmonary effort is normal.   Skin:     General: Skin is warm and dry.   Neurological:      Mental Status: She is alert and oriented to person, place, and time.      Motor: Weakness present.              Neurological Exam:   LOC: alert  Attention Span: Good   Language: No  "aphasia  Articulation: Dysarthria  Orientation: Person, Place, Time   EOM (CN III, IV, VI): Full/intact  Facial Movement (CN VII): Lower facial weakness on the Left  Motor: Arm left  Plegia 0/5  Leg left  Paresis: 1/5  Arm right  Normal 5/5  Leg right Normal 5/5    Laboratory:  CMP: No results for input(s): "GLUCOSE", "CALCIUM", "ALBUMIN", "PROT", "NA", "K", "CO2", "CL", "BUN", "CREATININE", "ALKPHOS", "ALT", "AST", "BILITOT" in the last 24 hours.  BMP:   Recent Labs   Lab 06/28/25  0242      K 3.4*      CO2 25   BUN 11   CREATININE 0.6   CALCIUM 9.1     CBC:   Recent Labs   Lab 06/28/25  0242   WBC 7.63   RBC 4.36   HGB 13.5   HCT 40.2      MCV 92   MCH 31.0   MCHC 33.6     Lipid Panel:   Recent Labs   Lab 06/24/25  1855   CHOL 236*   LDLCALC 152.4   HDL 74   TRIG 48     Coagulation:   Recent Labs   Lab 06/25/25  0544   INR 1.1   APTT 26.1     Platelet Aggregation Study: No results for input(s): "PLTAGG", "PLTAGINTERP", "PLTAGREGLACO", "ADPPLTAGGREG" in the last 168 hours.  Hgb A1C:   Recent Labs   Lab 06/25/25  0544   HGBA1C 5.5     TSH:   Recent Labs   Lab 06/24/25  1855   TSH 0.347*       Diagnostic Results      Brain Imaging   MRI BRAIN W/O CONTRAST 6/25/25  Impression:     1. Motion compromised, prematurely terminated study.  2. Corresponding to findings on prior CT/CTA imaging of 06/24/2025, multifocal areas of expansile restricted diffusion T2 weighted signal abnormality are noted throughout the right MCA territory in keeping with acute to early subacute infarct.  No definite macroscopic hemorrhage or significant mass effect at the present time.  3. Diminutive flow related signal of right MCA branches within the sylvian fissure, in keeping with distal M1 occlusion demonstrated on prior CTA performed 06/24/2025.  Serpiginous FLAIR hyperintense signal in this region in keeping with slow and/or disorganized flow due to the proximal occlusion.    CT HEAD W/O CONTRAST 6/26/25  Impression:   "   Similar distribution of recent right MCA distribution infarcts without evidence of hemorrhagic conversion or midline shift.     No new major vascular distribution infarct.     8 mm partially calcified lesion with adjacent cyst formation in the region of the right choroid fissure/medial temporal lobe.  This is of unclear etiology and although other lesions are not excluded, a cavernous malformation to be considered.  MR imaging with contrast when the patient is stable would be helpful for further characterization.     Vessel Imaging   CTA HEAD AND NECK 6/24/24  Impression:     1. No acute intracranial abnormality.  2. No large vessel occlusion.  3.  Partially imaged 3.8 cm circumscribed soft tissue density/ mass extending from the superior mediastinum to the right suprahilar region of unclear etiology.     % stenosis derived by comparing the narrowest segment with the distal luminal diameter as related to the reported measure of arterial narrowing.        Cardiac Imaging   ECHO 6/25/25    Left Ventricle: The left ventricle is normal in size. Normal wall thickness. There is normal systolic function. Quantitated ejection fraction is 62%. There is normal diastolic function.    Right Ventricle: The right ventricle is normal in size measuring 3.4 cm. Wall thickness is normal. Systolic function is normal.    Aortic Valve: There is mild aortic regurgitation.    Mitral Valve: Mildly thickened anterior leaflet of undetermined significance.  Consider KAREN for better evaluation of the mitral valve in the setting of stroke    Pulmonary Artery: The estimated pulmonary artery systolic pressure is 17 mmHg.    IVC/SVC: Normal venous pressure at 3 mmHg.    Study is negative for shunt.

## 2025-06-30 LAB
ABSOLUTE EOSINOPHIL (OHS): 0.05 K/UL
ABSOLUTE MONOCYTE (OHS): 0.4 K/UL (ref 0.3–1)
ABSOLUTE NEUTROPHIL COUNT (OHS): 2.4 K/UL (ref 1.8–7.7)
ALBUMIN SERPL BCP-MCNC: 3.7 G/DL (ref 3.5–5.2)
ALP SERPL-CCNC: 72 UNIT/L (ref 40–150)
ALT SERPL W/O P-5'-P-CCNC: 7 UNIT/L (ref 10–44)
ANION GAP (OHS): 11 MMOL/L (ref 8–16)
AST SERPL-CCNC: 14 UNIT/L (ref 11–45)
BASOPHILS # BLD AUTO: 0.05 K/UL
BASOPHILS NFR BLD AUTO: 0.9 %
BILIRUB SERPL-MCNC: 0.4 MG/DL (ref 0.1–1)
BUN SERPL-MCNC: 14 MG/DL (ref 6–20)
CALCIUM SERPL-MCNC: 9.4 MG/DL (ref 8.7–10.5)
CHLORIDE SERPL-SCNC: 104 MMOL/L (ref 95–110)
CO2 SERPL-SCNC: 22 MMOL/L (ref 23–29)
CREAT SERPL-MCNC: 0.6 MG/DL (ref 0.5–1.4)
ERYTHROCYTE [DISTWIDTH] IN BLOOD BY AUTOMATED COUNT: 13.3 % (ref 11.5–14.5)
GFR SERPLBLD CREATININE-BSD FMLA CKD-EPI: >60 ML/MIN/1.73/M2
GLUCOSE SERPL-MCNC: 92 MG/DL (ref 70–110)
HCT VFR BLD AUTO: 42 % (ref 37–48.5)
HGB BLD-MCNC: 14.2 GM/DL (ref 12–16)
IMM GRANULOCYTES # BLD AUTO: 0.02 K/UL (ref 0–0.04)
IMM GRANULOCYTES NFR BLD AUTO: 0.4 % (ref 0–0.5)
LYMPHOCYTES # BLD AUTO: 2.37 K/UL (ref 1–4.8)
MAGNESIUM SERPL-MCNC: 1.8 MG/DL (ref 1.6–2.6)
MCH RBC QN AUTO: 31.1 PG (ref 27–31)
MCHC RBC AUTO-ENTMCNC: 33.8 G/DL (ref 32–36)
MCV RBC AUTO: 92 FL (ref 82–98)
NUCLEATED RBC (/100WBC) (OHS): 0 /100 WBC
PHOSPHATE SERPL-MCNC: 4.5 MG/DL (ref 2.7–4.5)
PLATELET # BLD AUTO: 346 K/UL (ref 150–450)
PMV BLD AUTO: 10.6 FL (ref 9.2–12.9)
POTASSIUM SERPL-SCNC: 3.9 MMOL/L (ref 3.5–5.1)
PROT SERPL-MCNC: 7.6 GM/DL (ref 6–8.4)
RBC # BLD AUTO: 4.57 M/UL (ref 4–5.4)
RELATIVE EOSINOPHIL (OHS): 0.9 %
RELATIVE LYMPHOCYTE (OHS): 44.8 % (ref 18–48)
RELATIVE MONOCYTE (OHS): 7.6 % (ref 4–15)
RELATIVE NEUTROPHIL (OHS): 45.4 % (ref 38–73)
SODIUM SERPL-SCNC: 137 MMOL/L (ref 136–145)
WBC # BLD AUTO: 5.29 K/UL (ref 3.9–12.7)

## 2025-06-30 PROCEDURE — 36415 COLL VENOUS BLD VENIPUNCTURE: CPT | Performed by: PHYSICIAN ASSISTANT

## 2025-06-30 PROCEDURE — 97112 NEUROMUSCULAR REEDUCATION: CPT

## 2025-06-30 PROCEDURE — 92507 TX SP LANG VOICE COMM INDIV: CPT

## 2025-06-30 PROCEDURE — 99233 SBSQ HOSP IP/OBS HIGH 50: CPT | Mod: ,,, | Performed by: PSYCHIATRY & NEUROLOGY

## 2025-06-30 PROCEDURE — 25000003 PHARM REV CODE 250: Performed by: PHYSICIAN ASSISTANT

## 2025-06-30 PROCEDURE — 80053 COMPREHEN METABOLIC PANEL: CPT | Performed by: PHYSICIAN ASSISTANT

## 2025-06-30 PROCEDURE — 97110 THERAPEUTIC EXERCISES: CPT

## 2025-06-30 PROCEDURE — 97535 SELF CARE MNGMENT TRAINING: CPT

## 2025-06-30 PROCEDURE — 83735 ASSAY OF MAGNESIUM: CPT | Performed by: PHYSICIAN ASSISTANT

## 2025-06-30 PROCEDURE — 99252 IP/OBS CONSLTJ NEW/EST SF 35: CPT | Mod: ,,, | Performed by: NURSE PRACTITIONER

## 2025-06-30 PROCEDURE — 63600175 PHARM REV CODE 636 W HCPCS: Performed by: PHYSICIAN ASSISTANT

## 2025-06-30 PROCEDURE — 11000001 HC ACUTE MED/SURG PRIVATE ROOM

## 2025-06-30 PROCEDURE — 84100 ASSAY OF PHOSPHORUS: CPT | Performed by: PHYSICIAN ASSISTANT

## 2025-06-30 PROCEDURE — 85025 COMPLETE CBC W/AUTO DIFF WBC: CPT | Performed by: PHYSICIAN ASSISTANT

## 2025-06-30 RX ADMIN — CLOPIDOGREL 75 MG: 75 TABLET ORAL at 09:06

## 2025-06-30 RX ADMIN — ENOXAPARIN SODIUM 40 MG: 40 INJECTION SUBCUTANEOUS at 05:06

## 2025-06-30 RX ADMIN — ASPIRIN 81 MG: 81 TABLET, COATED ORAL at 09:06

## 2025-06-30 RX ADMIN — METHOCARBAMOL 500 MG: 500 TABLET ORAL at 01:06

## 2025-06-30 RX ADMIN — METHOCARBAMOL 500 MG: 500 TABLET ORAL at 05:06

## 2025-06-30 RX ADMIN — LIDOCAINE 1 PATCH: 50 PATCH CUTANEOUS at 05:06

## 2025-06-30 RX ADMIN — ATORVASTATIN CALCIUM 40 MG: 40 TABLET, FILM COATED ORAL at 09:06

## 2025-06-30 RX ADMIN — METHOCARBAMOL 500 MG: 500 TABLET ORAL at 11:06

## 2025-06-30 NOTE — PLAN OF CARE
Message received from [facility] informing this CM that they have accepted patient and have submitted for insurance authorization. Will continue to follow.    Tresa Arrieta RN  Ext [phone]

## 2025-06-30 NOTE — PLAN OF CARE
Goals remain appropriate.  Problem: Occupational Therapy  Goal: Occupational Therapy Goal  Description: Goals set 6/26 with an expiration date, 7/24:  Patient will increase functional independence with ADLs by performing:    Patient will demonstrate rolling to the right with min assist.  Not met   Patient will demonstrate rolling to the left with modified independence.   Not met  Patient will demonstrate supine -sit with min assist.   Not met  Patient will demonstrate stand pivot transfers with min assist.   Not met  Patient will demonstrate grooming while standing with min assist.   Not met  Patient will demonstrate upper body dressing with min assist while seated EOB.   Not met  Patient will demonstrate lower body dressing with mod assist while seated EOB.   Not met  Patient will demonstrate toileting with mod assist.   Not met  Patient will demonstrate bathing while seated EOB with mod assist.   Not met  Patient's family / caregiver will demonstrate independence and safety with assisting patient with self-care skills and functional mobility.     Not met  Patient's family / caregiver will demonstrate independence with providing ROM and changes in bed positioning.   Not met  Patient and/or patient's family will verbalize understanding of stroke prevention guidelines, personal risk factors and stroke warning signs via teachback method.  Not met           Outcome: Progressing

## 2025-06-30 NOTE — PT/OT/SLP PROGRESS
"Occupational Therapy   Treatment    Name: Cadence Vasques  MRN: [id number]  Admitting Diagnosis:  Cerebrovascular accident (CVA) due to occlusion of right middle cerebral artery       Recommendations:     Discharge Recommendations: High Intensity Therapy  Discharge Equipment Recommendations:  bath bench, bedside commode  Barriers to discharge:  None    Assessment:     Cadence Vasques is a 49 y.o. female with a medical diagnosis of Cerebrovascular accident (CVA) due to occlusion of right middle cerebral artery.  She presents with performance deficits affecting function are weakness, impaired endurance, impaired self care skills, impaired functional mobility.     Rehab Prognosis:  Good; patient would benefit from acute skilled OT services to address these deficits and reach maximum level of function.       Plan:     Patient to be seen 4 x/week to address the above listed problems via cognitive retraining, neuromuscular re-education, therapeutic exercises, therapeutic activities, self-care/home management  Plan of Care Expires: 07/24/25  Plan of Care Reviewed with: patient    Subjective     Patient:  "My goal is to walk."  Pain/Comfort:  Pain Rating 1: 0/10  Pain Rating Post-Intervention 1: 0/10    Objective:     Communicated with: Nurse prior to session.  Patient found supine with telemetry, PureWick, bed alarm, pressure relief boots, SCD upon OT entry to room.    General Precautions: Standard, aspiration, fall    Orthopedic Precautions:N/A  Braces: N/A  Respiratory Status: Room air     Occupational Performance:     Bed Mobility:    Patient completed Rolling/Turning to Left with  supervision  Patient completed Supine to Sit with stand by assistance    Functional Mobility/Transfers:  Patient completed Sit <> Stand Transfer with moderate assistance  with  no assistive device   Moderate assist with stand pivot transfers    Activities of Daily Living:  Grooming: moderate assistance while standing with left UE " in weight bearing  Upper Body Dressing: maximal assistance while seated EOB  Lower Body Dressing: maximal assistance while seated EOB    AMPA 6 Click ADL: 12    Treatment & Education:  Patient alert and oriented x 3; able to follow 4/4 one step commands.  Patient attentive and interactive throughout the session.  Drooling noted throughout session.  Addressed L UE PROM one set x 10 rep in all planes of motion; followed by left UE weight bearing.  Patient able to identify 7 colors, 7 fruits and 4 vegetables each within 30 second time frame.  Able to sequence 7/7 days of the week and 12/12 months of the year.  Addressed oral motor exercises while seated EOB.      Patient left supine with all lines intact, call button in reach, and bed alarm on    GOALS:   Multidisciplinary Problems       Occupational Therapy Goals          Problem: Occupational Therapy    Goal Priority Disciplines Outcome Interventions   Occupational Therapy Goal     OT, PT/OT Progressing    Description: Goals set 6/26 with an expiration date, 7/24:  Patient will increase functional independence with ADLs by performing:    Patient will demonstrate rolling to the right with min assist.  Not met   Patient will demonstrate rolling to the left with modified independence.   Not met  Patient will demonstrate supine -sit with min assist.   Not met  Patient will demonstrate stand pivot transfers with min assist.   Not met  Patient will demonstrate grooming while standing with min assist.   Not met  Patient will demonstrate upper body dressing with min assist while seated EOB.   Not met  Patient will demonstrate lower body dressing with mod assist while seated EOB.   Not met  Patient will demonstrate toileting with mod assist.   Not met  Patient will demonstrate bathing while seated EOB with mod assist.   Not met  Patient's family / caregiver will demonstrate independence and safety with assisting patient with self-care skills and functional mobility.     Not  met  Patient's family / caregiver will demonstrate independence with providing ROM and changes in bed positioning.   Not met  Patient and/or patient's family will verbalize understanding of stroke prevention guidelines, personal risk factors and stroke warning signs via teachback method.  Not met                                Time Tracking:     OT Date of Treatment: 06/30/25  OT Start Time: 0732  OT Stop Time: 0756  OT Total Time (min): 24 min    Billable Minutes:Self Care/Home Management 12  Neuromuscular Re-education 12    OT/ADRIANA: OT     Number of ADRIANA visits since last OT visit: 0    6/30/2025

## 2025-06-30 NOTE — HPI
Cadence is a 49-year-old female with PMHx of HTN, tobacco use, substance abuse. Patient presented to [facility] [place] 6/25/25 as a transfer from [facility] for acute R MCA stroke. UDS + cocaine. CTA revealed R M1 occlusion. Determined not a candidate for lytics or IR due to LKW being >24hrs prior. MRI brain shows multifocal areas of moderate sized acute infarct involving the R MCA territory with localized mass effect but no MLS. Also, with hypokalemia and now improved.     Functional History: Patient lives with cousin in a single story home with 2 steps to enter. Prior to admission, I. DME: none.

## 2025-06-30 NOTE — PROGRESS NOTES
Adrian Verma - Neurosurgery ([facility])  Vascular Neurology  Comprehensive Stroke Center  Progress Note    Assessment/Plan:     * Cerebrovascular accident (CVA) due to occlusion of right middle cerebral artery  Cadence Vasques is a 49 y.o. female with PMH of HTN, tobacco use (0.5 PPD), substance abuse that presents as a transfer from [facility] for higher level of care of acute R MCA stroke.  She initially presented to [facility] ED with LSW, LFD, and dysarthria. LKW >24hrs prior, as family reported seeing her 3-4 days prior with facial droop and dysarthria. Telestroke NIHSS 12. CTH with hypoattenuation c/w recent infarcts, CTA revealed R M1 occlusion. Determined not a candidate for lytics or IR due to LKW being >24hrs prior. She was transferred to [facility] for further eval and stroke workup. MRI brain shows multifocal areas of moderate sized acute infarct involving the R MCA territory with localized mass effect but no MLS. She was evaluated by gen neuro at [facility], given risk for development of edema that would require neurosurgical intervention decision made to transfer patient to [facility] for admission to [facility] for higher level of care and close neuro monitoring.   On admit to [facility] neuro exam significant for LUE/LLE hemiplegia, severe LFD, mod-severe dysarthria, R gaze preference but is able to cross midline/track to L. NIHSS 13. NSGY consulted for hemicrani watch.    06/30/2025 NAEON. Neuro exam stable. Patient drowsy, reports she did not sleep last night secondary to a noisy neighbor. Case management to begin authorization process for rehab.       Antithrombotics for secondary stroke prevention: Antiplatelets: Aspirin: 81 mg daily  Clopidogrel: 75 mg daily    Statins for secondary stroke prevention and hyperlipidemia, if present: Statins: Atorvastatin- 40 mg daily    Aggressive risk factor modification: Smoking, Diet, Exercise     Rehab efforts: The patient has been evaluated by a stroke team provider and  the therapy needs have been fully considered based off the presenting complaints and exam findings. The following therapy evaluations are needed: PT evaluate and treat, OT evaluate and treat, SLP evaluate and treat HIT    Diagnostics ordered/pending: None     VTE prophylaxis: Heparin 5000 units SQ every 8 hours  Mechanical prophylaxis: Place SCDs    BP parameters: SBP <160      Hypokalemia  -K 3.4, 6/28  - K 3.9    Essential hypertension  -Stroke risk factor  -SBP goal <160, maintain MAP >65  -BP range in the last 24 hrs: BP  Min: 114/76  Max: 129/83  -Current antihypertensive regimen:   --PRN labetalol/hydralazine      Cocaine use  -Stroke risk factor  -UDS + cocaine  - on cessation    Tobacco dependency  -Stroke risk factor  - on cessation  -Consider nicotine patch PRN  -Referral for smoking cessation at discharge if patient agreeable         06/26/2025  NAEON. CTH with similar distribution of recent right MCA distribution infarcts without evidence of hemorrhagic conversion or midline shift; No new major vascular distribution infarct. Neuro exam stable. Diet advanced to soft/bite sized. Continue to work with PT/OT.  06/27/2025 NAEON. Drowsy. Awakens to voice. LUE plegic. LLE strength improving. PT/OT recs HIT. Stepped down to NPU. Awaiting transfer.   06/28/2025 NAEON. Improved alertness. K 3.4. Replacement ordered. Dispo planning ongoing. PT/OT recs HIT.   06/29/2025 NAEON. Awakens to voice. Neuro exam stable. Dispo planning ongoing.  06/30/2025 NAEON. Neuro exam stable. Patient drowsy, reports she did not sleep last night secondary to a noisy neighbor. Case management to begin authorization process for rehab.     STROKE DOCUMENTATION        NIH Scale:  1a. Level of Consciousness: 0-->Alert, keenly responsive  1b. LOC Questions: 0-->Answers both questions correctly  1c. LOC Commands: 0-->Performs both tasks correctly  2. Best Gaze: 0-->Normal  3. Visual: 0-->No visual loss  4. Facial Palsy:  2-->Partial paralysis (total or near-total paralysis of lower face)  5a. Motor Arm, Left: 4-->No movement  5b. Motor Arm, Right: 0-->No drift, limb holds 90 (or 45) degrees for full 10 secs  6a. Motor Leg, Left: 3-->No effort against gravity, leg falls to bed immediately  6b. Motor Leg, Right: 0-->No drift, leg holds 30 degree position for full 5 secs  7. Limb Ataxia: 0-->Absent  8. Sensory: 1-->Mild-to-moderate sensory loss, patient feels pinprick is less sharp or is dull on the affected side, or there is a loss of superficial pain with pinprick, but patient is aware of being touched  9. Best Language: 0-->No aphasia, normal  10. Dysarthria: 1-->Mild-to-moderate dysarthria, patient slurs at least some words and, at worst, can be understood with some difficulty  11. Extinction and Inattention (formerly Neglect): 0-->No abnormality  Total (NIH Stroke Scale): 11       Modified Raúl Score: 0  Julia Coma Scale:    ABCD2 Score:    [id number] Score:   HAS -BLED Score:   ICH Score:   Hunt & Jose Classification:      Hemorrhagic change of an Ischemic Stroke: Does this patient have an ischemic stroke with hemorrhagic changes? No     Neurologic Chief Complaint: R MCA stroke    Subjective:     Interval History: Patient is seen for follow-up neurological assessment and treatment recommendations: See hospital course.     HPI, Past Medical, Family, and Social History remains the same as documented in the initial encounter.     Review of Systems   Neurological:  Positive for facial asymmetry, speech difficulty and weakness.     Scheduled Meds:   aspirin  81 mg Oral Daily    atorvastatin  40 mg Oral Daily    clopidogreL  75 mg Oral Daily    enoxparin  40 mg Subcutaneous Daily    LIDOcaine  1 patch Transdermal Q24H    methocarbamoL  500 mg Oral Q6H    senna-docusate  1 tablet Oral BID     Continuous Infusions:  PRN Meds:  Current Facility-Administered Medications:     acetaminophen, 650 mg, Oral, Q6H PRN    bisacodyL, 10 mg,  Rectal, Daily PRN    labetalol, 10 mg, Intravenous, Q6H PRN    ondansetron, 4 mg, Intravenous, Q8H PRN    sodium chloride 0.9%, 10 mL, Intravenous, PRN    Objective:     Vital Signs (Most Recent):  Temp: 98.5 °F (36.9 °C) (06/30/25 1147)  Pulse: 69 (06/30/25 1147)  Resp: 18 (06/30/25 1147)  BP: 120/77 (06/30/25 1147)  SpO2: 98 % (06/30/25 1147)  BP Location: Right arm    Vital Signs Range (Last 24H):  Temp:  [97.6 °F (36.4 °C)-98.9 °F (37.2 °C)]   Pulse:  [63-79]   Resp:  [18]   BP: (114-129)/(73-83)   SpO2:  [97 %-98 %]   BP Location: Right arm       Physical Exam  HENT:      Head: Normocephalic and atraumatic.      Mouth/Throat:      Mouth: Mucous membranes are moist.   Eyes:      Extraocular Movements: Extraocular movements intact.      Conjunctiva/sclera: Conjunctivae normal.   Cardiovascular:      Rate and Rhythm: Normal rate.   Pulmonary:      Effort: Pulmonary effort is normal.   Skin:     General: Skin is warm and dry.   Neurological:      Mental Status: She is alert and oriented to person, place, and time.      Motor: Weakness present.              Neurological Exam:   LOC: alert  Attention Span: Good   Language: No aphasia  Articulation: Dysarthria  Orientation: Person, Place, Time   EOM (CN III, IV, VI): Full/intact  Facial Movement (CN VII): Lower facial weakness on the Left  Motor: Arm left  Plegia 0/5  Leg left  Paresis: 1/5  Arm right  Normal 5/5  Leg right Normal 5/5    Laboratory:  CMP:   Recent Labs   Lab 06/30/25  0738   CALCIUM 9.4   ALBUMIN 3.7   PROT 7.6      K 3.9   CO2 22*      BUN 14   CREATININE 0.6   ALKPHOS 72   ALT 7*   AST 14   BILITOT 0.4     BMP:   Recent Labs   Lab 06/30/25  0738      K 3.9      CO2 22*   BUN 14   CREATININE 0.6   CALCIUM 9.4     CBC:   Recent Labs   Lab 06/30/25  0738   WBC 5.29   RBC 4.57   HGB 14.2   HCT 42.0      MCV 92   MCH 31.1*   MCHC 33.8     Lipid Panel:   Recent Labs   Lab 06/24/25  1855   CHOL 236*   LDLCALC 152.4   HDL 74  "  TRIG 48     Coagulation:   Recent Labs   Lab 06/25/25  0544   INR 1.1   APTT 26.1     Platelet Aggregation Study: No results for input(s): "PLTAGG", "PLTAGINTERP", "PLTAGREGLACO", "ADPPLTAGGREG" in the last 168 hours.  Hgb A1C:   Recent Labs   Lab 06/25/25  0544   HGBA1C 5.5     TSH:   Recent Labs   Lab 06/24/25  1855   TSH 0.347*       Diagnostic Results      Brain Imaging   MRI BRAIN W/O CONTRAST 6/25/25  Impression:     1. Motion compromised, prematurely terminated study.  2. Corresponding to findings on prior CT/CTA imaging of 06/24/2025, multifocal areas of expansile restricted diffusion T2 weighted signal abnormality are noted throughout the right MCA territory in keeping with acute to early subacute infarct.  No definite macroscopic hemorrhage or significant mass effect at the present time.  3. Diminutive flow related signal of right MCA branches within the sylvian fissure, in keeping with distal M1 occlusion demonstrated on prior CTA performed 06/24/2025.  Serpiginous FLAIR hyperintense signal in this region in keeping with slow and/or disorganized flow due to the proximal occlusion.    CT HEAD W/O CONTRAST 6/26/25  Impression:     Similar distribution of recent right MCA distribution infarcts without evidence of hemorrhagic conversion or midline shift.     No new major vascular distribution infarct.     8 mm partially calcified lesion with adjacent cyst formation in the region of the right choroid fissure/medial temporal lobe.  This is of unclear etiology and although other lesions are not excluded, a cavernous malformation to be considered.  MR imaging with contrast when the patient is stable would be helpful for further characterization.     Vessel Imaging   CTA HEAD AND NECK 6/24/24  Impression:     1. No acute intracranial abnormality.  2. No large vessel occlusion.  3.  Partially imaged 3.8 cm circumscribed soft tissue density/ mass extending from the superior mediastinum to the right suprahilar " region of unclear etiology.     % stenosis derived by comparing the narrowest segment with the distal luminal diameter as related to the reported measure of arterial narrowing.        Cardiac Imaging   ECHO 6/25/25    Left Ventricle: The left ventricle is normal in size. Normal wall thickness. There is normal systolic function. Quantitated ejection fraction is 62%. There is normal diastolic function.    Right Ventricle: The right ventricle is normal in size measuring 3.4 cm. Wall thickness is normal. Systolic function is normal.    Aortic Valve: There is mild aortic regurgitation.    Mitral Valve: Mildly thickened anterior leaflet of undetermined significance.  Consider KAREN for better evaluation of the mitral valve in the setting of stroke    Pulmonary Artery: The estimated pulmonary artery systolic pressure is 17 mmHg.    IVC/SVC: Normal venous pressure at 3 mmHg.    Study is negative for shunt.       Kimberlyn Valdes PA-C  Comprehensive Stroke Center  Department of Vascular Neurology   [facility])

## 2025-06-30 NOTE — ASSESSMENT & PLAN NOTE
Cadence Vasques is a 49 y.o. female with PMH of HTN, tobacco use (0.5 PPD), substance abuse that presents as a transfer from [facility] for higher level of care of acute R MCA stroke.  She initially presented to [facility] ED with LSW, LFD, and dysarthria. LKW >24hrs prior, as family reported seeing her 3-4 days prior with facial droop and dysarthria. Telestroke NIHSS 12. CTH with hypoattenuation c/w recent infarcts, CTA revealed R M1 occlusion. Determined not a candidate for lytics or IR due to LKW being >24hrs prior. She was transferred to [facility] for further eval and stroke workup. MRI brain shows multifocal areas of moderate sized acute infarct involving the R MCA territory with localized mass effect but no MLS. She was evaluated by gen neuro at [facility], given risk for development of edema that would require neurosurgical intervention decision made to transfer patient to [facility] for admission to [facility] for higher level of care and close neuro monitoring.   On admit to [facility] neuro exam significant for LUE/LLE hemiplegia, severe LFD, mod-severe dysarthria, R gaze preference but is able to cross midline/track to L. NIHSS 13. NSGY consulted for hemicrani watch.    06/30/2025 JACOB. Neuro exam stable. Patient drowsy, reports she did not sleep last night secondary to a noisy neighbor. Case management to begin authorization process for rehab.       Antithrombotics for secondary stroke prevention: Antiplatelets: Aspirin: 81 mg daily  Clopidogrel: 75 mg daily    Statins for secondary stroke prevention and hyperlipidemia, if present: Statins: Atorvastatin- 40 mg daily    Aggressive risk factor modification: Smoking, Diet, Exercise     Rehab efforts: The patient has been evaluated by a stroke team provider and the therapy needs have been fully considered based off the presenting complaints and exam findings. The following therapy evaluations are needed: PT evaluate and treat, OT evaluate and treat, SLP evaluate  and treat HIT    Diagnostics ordered/pending: None     VTE prophylaxis: Heparin 5000 units SQ every 8 hours  Mechanical prophylaxis: Place SCDs    BP parameters: SBP <160

## 2025-06-30 NOTE — PT/OT/SLP PROGRESS
"Physical Therapy Treatment    Patient Name:  Cadence Vasques   MRN:  [id number]    Recommendations:     Discharge Recommendations: High Intensity Therapy  Discharge Equipment Recommendations: bath bench, bedside commode  Barriers to discharge: Increased level of assistance required for safe functional mobility    Assessment:     Cadence Vasques is a 49 y.o. female admitted with a medical diagnosis of Cerebrovascular accident (CVA) due to occlusion of right middle cerebral artery.  She presents with the following impairments/functional limitations: weakness, impaired endurance, impaired self care skills, impaired functional mobility, impaired coordination, decreased lower extremity function, decreased upper extremity function, decreased safety awareness, impaired balance, impaired fine motor, gait instability She was A+O x 4 at start of session, consented to PT, and was highly motivated to participate in session. She displayed carryover of compensatory techniques for L LE assistance and was receptive to new stand and squat pivot techniques. She displayed improved attention to tasks and safety awareness. Patient has demonstrated sufficient progression to warrant high intensity therapy evidenced by objectives noted below and would greatly benefit from further skilled PT.     Rehab Prognosis: Good; patient would benefit from acute skilled PT services to address these deficits and reach maximum level of function.    Recent Surgery: * No surgery found *      Plan:     During this hospitalization, patient to be seen 5 x/week to address the identified rehab impairments via gait training, therapeutic activities, therapeutic exercises, neuromuscular re-education and progress toward the following goals:    Plan of Care Expires:  07/07/25    Subjective     Chief Complaint: Tried calling nursing with bed call bell that doesn't work to address soiled lines/pericare   Patient/Family Comments/goals: "Let's do this." "   Pain/Comfort:  Pain Rating 1: 0/10  Pain Rating Post-Intervention 1: 0/10      Objective:     Communicated with RN prior to session.  Patient found HOB elevated in R sidelying with telemetry, bed alarm, pressure relief boots, SCD (purewick not in place) upon PT entry to room. Patient's Purewick suction was dislodged and suction was attached to L LE.     General Precautions: Standard, fall  Orthopedic Precautions: N/A  Braces: N/A  Respiratory Status: Room air     Functional Mobility:  Bed Mobility:     Rolling Left:  stand by assistance  Scooting: stand by assistance  Supine to Sit: contact guard assistance  Transfers:     Sit to Stand:  minimum assistance on L side to block L knee   Bed to Chair: 5 total   Stand Pivot: Mod A x 1   Squat Pivot: Max A on first attempt to teach patient transfer   Squat pivot to R side: Min A x 1  Squat pivot to L side: Mod A x 1   Balance:   Static sitting: CGA with R UE support   Dynamic sitting: Min A progressed to CGA with LE on ground   Static standing: Min A for L LE blocking     AM-PAC 6 CLICK MOBILITY  Turning over in bed (including adjusting bedclothes, sheets and blankets)?: 3  Sitting down on and standing up from a chair with arms (e.g., wheelchair, bedside commode, etc.): 3  Moving from lying on back to sitting on the side of the bed?: 3  Moving to and from a bed to a chair (including a wheelchair)?: 2  Need to walk in hospital room?: 1  Climbing 3-5 steps with a railing?: 1  Basic Mobility Total Score: 13       Treatment & Education:  Pt educated on use of squat and stand pivot transfers, proper L extremity positioning for safety and efficiency and R extremity positioning for maximal function.   Pt educated on use of call bell on remote to contact nursing to return to bed or to help with pericare. Pt was informed that call bell on bed is nonfunctional and to use button on remote.     Patient left up in chair with all lines intact, call button in reach, chair alarm on,  and RN notified..    GOALS:   Multidisciplinary Problems       Physical Therapy Goals          Problem: Physical Therapy    Goal Priority Disciplines Outcome Interventions   Physical Therapy Goal     PT, PT/OT Progressing    Description: Goals to be completed by: 7/4/25    Pt will perform sup<>sit transfers w/ minimum assistance  Pt will be able to stand up from EOB w/ minimum assistance using LRAD  Pt will ambulate 10 feet w/ moderate assistance using LRAD  Transfer from bed to chair w/ modA using LRAD  Propel w/c 50' w/ CGA   Pt will be independent w/ HEP therex on BLE w/ good form and ROM                        DME Justifications:   Cadence requires a commode for home use because she is confined to a single room.  Cadence Vasques has a mobility limitation that significantly impairs her ability to participate in one or more mobility related activities of daily living (MRADLs) such as toileting, feeding, dressing, grooming, and bathing in customary locations in the home.  The mobility limitation cannot be sufficiently resolved by the use of a cane or walker.   Patients upper body strength is not sufficient to propel a standard WC. The use of a lightweight wheelchair will significantly improve the patients ability to participate in MRADLS and the patient will use it on regular basis in the home.   She has a caregiver who is available, willing, and able to provide assistance with the wheelchair when needed.     Time Tracking:     PT Received On: 06/30/25  PT Start Time: 0149     PT Stop Time: 0218  PT Total Time (min): 29 min     Billable Minutes: Therapeutic Exercise 15 and Neuromuscular Re-education 14    Treatment Type: Treatment  PT/PTA: PT     Number of PTA visits since last PT visit: 0     06/30/2025

## 2025-06-30 NOTE — ASSESSMENT & PLAN NOTE
-Stroke risk factor  -SBP goal <160, maintain MAP >65  -BP range in the last 24 hrs: BP  Min: 114/76  Max: 129/83  -Current antihypertensive regimen:   --PRN labetalol/hydralazine

## 2025-06-30 NOTE — PT/OT/SLP PROGRESS
"Speech Language Pathology Treatment    Patient Name:  Cadence Vasques   MRN:  [id number]  Admitting Diagnosis: Cerebrovascular accident (CVA) due to occlusion of right middle cerebral artery    Recommendations:                 General Recommendations:  Dysphagia therapy, Speech/language therapy, and Cognitive-linguistic therapy  Diet recommendations:  Soft & Bite Sized Diet - IDDSI Level 6, Liquid Diet Level: Thin   Aspiration Precautions: Alternating bites/sips, Check for pocketing/oral residue, Eliminate distractions, HOB to 90 degrees, Meds crushed in puree, Small bites/sips, and Standard aspiration precautions   General Precautions: Standard, aspiration, fall  Communication strategies:  provide increased time to answer  Discharge recommendations:  High Intensity Therapy   Barriers to Discharge:  Decreased Care Giver Support    Assessment:     Cadence Vasques is a 49 y.o. female with an SLP diagnosis of Dysphagia, Dysarthria, Cognitive-Linguistic Impairment, and Visio-Spatial Impairment.    Subjective     "I dont like that meat" per pt  Patient goals: rehab     Pain/Comfort:  Pain Rating 1: 0/10  Pain Rating Post-Intervention 1: 0/10    Respiratory Status: Room air    Objective:     Has the patient been evaluated by SLP for swallowing?   Yes  Keep patient NPO? No   Current Respiratory Status:        Pt. Seen at bedside and with family present.  Ongoing education provided to pt. And family re attention to left, safe swallow strategies and aspiration precautions.  Attention to task was fair with pt. Distracted , talking over therapist and family.  She was oriented to month, year and place with fair recall of recent events.  Given cues, she did attend to examiner on left side of bed, but did not maintain eye contact without frequent cues.  Ms. Vasques responded to functional math and time calculations with 90% accuracy given min cues and repetition.  She responded to category exclusion tasks in field of " 5 with 80% accuracy given min-mod cues.  Pt. Responded to mental manipulation tasks given 3 times with 50% accuracy with repetition and cues needed.       Goals:   Multidisciplinary Problems       SLP Goals          Problem: SLP    Goal Priority Disciplines Outcome   SLP Goal     SLP Progressing   Description: Speech Language Pathology Goals  Goals expected to be met by 7/10  1. Pt will participate in ongoing assessment of swallow.   2. Pt will implement simple speech strategies at sentence level with min cues.  3. Pt will complete higher level reasoning/deduction tasks with 90% accy.   4. Pt will complete simple L attention tasks with 90% accy with max cues.   5. Pt will complete assessment of reading, writing, visual spatial skills to determine need for tx.                                Plan:     Patient to be seen:  4 x/week   Plan of Care expires:  07/26/25  Plan of Care reviewed with:  patient, family   SLP Follow-Up:  Yes       Time Tracking:     SLP Treatment Date:   06/30/25  Speech Start Time:  1100  Speech Stop Time:  1119     Speech Total Time (min):  19 min    Billable Minutes: Speech Therapy Individual 11 and Self Care/Home Management Training 8    06/30/2025

## 2025-06-30 NOTE — PLAN OF CARE
Adrian Verma - Neurosurgery ([facility])  Discharge Reassessment    Primary Care Provider: No, Primary Doctor    Expected Discharge Date: 7/2/2025    Reassessment (most recent)       Discharge Reassessment - 06/30/25 1322          Discharge Reassessment    Assessment Type Discharge Planning Reassessment     Did the patient's condition or plan change since previous assessment? Yes     Discharge Plan discussed with: Sibling;Patient     Name(s) and Number(s) Dorothea Hernandez (sister) [phone]     Communicated LETICIA with patient/caregiver Yes     Discharge Plan A Rehab     Discharge Plan B Home with family     DME Needed Upon Discharge  other (see comments)   TBD    Transition of Care Barriers Substance Abuse     Why the patient remains in the hospital Requires continued medical care        Post-Acute Status    Post-Acute Authorization Placement     Post-Acute Placement Status Referrals Sent                       Met with patient and spoke with her sister Dorothea ([phone]) to review the discharge recommendation of Rehab and they are agreeable to the plan    Patient/family provided list of facilities in-network with patient's payor plan. Providers that are owned, operated, or affiliated with [facility] are included on the list.     Notified that referral sent to below listed facilities from in-network list based on proximity to home/family support:   [facility]  2. [facility]  3.    Patient/family instructed to identify preference.    Preferred Facility:   [facility]      If an additional preferred facility not listed above is identified, additional referral to be sent. If above facilities unable to accept, will send additional referrals to in-network providers.        Tresa Arrieta RN  Ext [phone]

## 2025-06-30 NOTE — PLAN OF CARE
Problem: Adult Inpatient Plan of Care  Goal: Plan of Care Review  Outcome: Progressing  Goal: Patient-Specific Goal (Individualized)  Outcome: Progressing  Goal: Absence of Hospital-Acquired Illness or Injury  Outcome: Progressing  Goal: Optimal Comfort and Wellbeing  Outcome: Progressing  Goal: Readiness for Transition of Care  Outcome: Progressing     Problem: Skin Injury Risk Increased  Goal: Skin Health and Integrity  Outcome: Progressing     Problem: Stroke, Ischemic (Includes Transient Ischemic Attack)  Goal: Optimal Coping  Outcome: Progressing  Goal: Effective Bowel Elimination  Outcome: Progressing  Goal: Optimal Cerebral Tissue Perfusion  Outcome: Progressing  Goal: Optimal Cognitive Function  Outcome: Progressing  Goal: Improved Communication Skills  Outcome: Progressing  Goal: Optimal Functional Ability  Outcome: Progressing  Goal: Optimal Nutrition Intake  Outcome: Progressing  Goal: Effective Oxygenation and Ventilation  Outcome: Progressing  Goal: Improved Sensorimotor Function  Outcome: Progressing  Goal: Safe and Effective Swallow  Outcome: Progressing  Goal: Effective Urinary Elimination  Outcome: Progressing

## 2025-06-30 NOTE — PLAN OF CARE
Problem: Adult Inpatient Plan of Care  Goal: Plan of Care Review  Outcome: Progressing  Goal: Patient-Specific Goal (Individualized)  Outcome: Progressing  Goal: Absence of Hospital-Acquired Illness or Injury  Outcome: Progressing  Goal: Optimal Comfort and Wellbeing  Outcome: Progressing  Goal: Readiness for Transition of Care  Outcome: Progressing     Problem: Skin Injury Risk Increased  Goal: Skin Health and Integrity  Outcome: Progressing     Problem: Stroke, Ischemic (Includes Transient Ischemic Attack)  Goal: Optimal Coping  Outcome: Progressing  Goal: Effective Bowel Elimination  Outcome: Progressing  Goal: Optimal Cerebral Tissue Perfusion  Outcome: Progressing  Goal: Optimal Cognitive Function  Outcome: Progressing  Goal: Improved Communication Skills  Outcome: Progressing  Goal: Optimal Functional Ability  Outcome: Progressing  Goal: Optimal Nutrition Intake  Outcome: Progressing  Goal: Effective Oxygenation and Ventilation  Outcome: Progressing  Goal: Improved Sensorimotor Function  Outcome: Progressing  Goal: Safe and Effective Swallow  Outcome: Progressing  Goal: Effective Urinary Elimination  Outcome: Progressing     Problem: Wound  Goal: Optimal Coping  Outcome: Progressing  Goal: Optimal Functional Ability  Outcome: Progressing  Goal: Absence of Infection Signs and Symptoms  Outcome: Progressing  Goal: Improved Oral Intake  Outcome: Progressing  Goal: Optimal Pain Control and Function  Outcome: Progressing  Goal: Skin Health and Integrity  Outcome: Progressing  Goal: Optimal Wound Healing  Outcome: Progressing

## 2025-06-30 NOTE — HOSPITAL COURSE
6/30/25: Participated with OT. Bed mob SV- SBA. Sit to stand modA with no AD. Grooming modA. UBD maxA.   7/1/25: Participated with OT. Bed mob SBA. Sit <> Stand Transfer with moderate assistance  with  no assistive device. Grooming Christiano.

## 2025-06-30 NOTE — SUBJECTIVE & OBJECTIVE
Neurologic Chief Complaint: R MCA stroke    Subjective:     Interval History: Patient is seen for follow-up neurological assessment and treatment recommendations: See hospital course.     HPI, Past Medical, Family, and Social History remains the same as documented in the initial encounter.     Review of Systems   Neurological:  Positive for facial asymmetry, speech difficulty and weakness.     Scheduled Meds:   aspirin  81 mg Oral Daily    atorvastatin  40 mg Oral Daily    clopidogreL  75 mg Oral Daily    enoxparin  40 mg Subcutaneous Daily    LIDOcaine  1 patch Transdermal Q24H    methocarbamoL  500 mg Oral Q6H    senna-docusate  1 tablet Oral BID     Continuous Infusions:  PRN Meds:  Current Facility-Administered Medications:     acetaminophen, 650 mg, Oral, Q6H PRN    bisacodyL, 10 mg, Rectal, Daily PRN    labetalol, 10 mg, Intravenous, Q6H PRN    ondansetron, 4 mg, Intravenous, Q8H PRN    sodium chloride 0.9%, 10 mL, Intravenous, PRN    Objective:     Vital Signs (Most Recent):  Temp: 98.5 °F (36.9 °C) (06/30/25 1147)  Pulse: 69 (06/30/25 1147)  Resp: 18 (06/30/25 1147)  BP: 120/77 (06/30/25 1147)  SpO2: 98 % (06/30/25 1147)  BP Location: Right arm    Vital Signs Range (Last 24H):  Temp:  [97.6 °F (36.4 °C)-98.9 °F (37.2 °C)]   Pulse:  [63-79]   Resp:  [18]   BP: (114-129)/(73-83)   SpO2:  [97 %-98 %]   BP Location: Right arm       Physical Exam  HENT:      Head: Normocephalic and atraumatic.      Mouth/Throat:      Mouth: Mucous membranes are moist.   Eyes:      Extraocular Movements: Extraocular movements intact.      Conjunctiva/sclera: Conjunctivae normal.   Cardiovascular:      Rate and Rhythm: Normal rate.   Pulmonary:      Effort: Pulmonary effort is normal.   Skin:     General: Skin is warm and dry.   Neurological:      Mental Status: She is alert and oriented to person, place, and time.      Motor: Weakness present.              Neurological Exam:   LOC: alert  Attention Span: Good   Language: No  "aphasia  Articulation: Dysarthria  Orientation: Person, Place, Time   EOM (CN III, IV, VI): Full/intact  Facial Movement (CN VII): Lower facial weakness on the Left  Motor: Arm left  Plegia 0/5  Leg left  Paresis: 1/5  Arm right  Normal 5/5  Leg right Normal 5/5    Laboratory:  CMP:   Recent Labs   Lab 06/30/25  0738   CALCIUM 9.4   ALBUMIN 3.7   PROT 7.6      K 3.9   CO2 22*      BUN 14   CREATININE 0.6   ALKPHOS 72   ALT 7*   AST 14   BILITOT 0.4     BMP:   Recent Labs   Lab 06/30/25  0738      K 3.9      CO2 22*   BUN 14   CREATININE 0.6   CALCIUM 9.4     CBC:   Recent Labs   Lab 06/30/25  0738   WBC 5.29   RBC 4.57   HGB 14.2   HCT 42.0      MCV 92   MCH 31.1*   MCHC 33.8     Lipid Panel:   Recent Labs   Lab 06/24/25  1855   CHOL 236*   LDLCALC 152.4   HDL 74   TRIG 48     Coagulation:   Recent Labs   Lab 06/25/25  0544   INR 1.1   APTT 26.1     Platelet Aggregation Study: No results for input(s): "PLTAGG", "PLTAGINTERP", "PLTAGREGLACO", "ADPPLTAGGREG" in the last 168 hours.  Hgb A1C:   Recent Labs   Lab 06/25/25  0544   HGBA1C 5.5     TSH:   Recent Labs   Lab 06/24/25  1855   TSH 0.347*       Diagnostic Results      Brain Imaging   MRI BRAIN W/O CONTRAST 6/25/25  Impression:     1. Motion compromised, prematurely terminated study.  2. Corresponding to findings on prior CT/CTA imaging of 06/24/2025, multifocal areas of expansile restricted diffusion T2 weighted signal abnormality are noted throughout the right MCA territory in keeping with acute to early subacute infarct.  No definite macroscopic hemorrhage or significant mass effect at the present time.  3. Diminutive flow related signal of right MCA branches within the sylvian fissure, in keeping with distal M1 occlusion demonstrated on prior CTA performed 06/24/2025.  Serpiginous FLAIR hyperintense signal in this region in keeping with slow and/or disorganized flow due to the proximal occlusion.    CT HEAD W/O CONTRAST " 6/26/25  Impression:     Similar distribution of recent right MCA distribution infarcts without evidence of hemorrhagic conversion or midline shift.     No new major vascular distribution infarct.     8 mm partially calcified lesion with adjacent cyst formation in the region of the right choroid fissure/medial temporal lobe.  This is of unclear etiology and although other lesions are not excluded, a cavernous malformation to be considered.  MR imaging with contrast when the patient is stable would be helpful for further characterization.     Vessel Imaging   CTA HEAD AND NECK 6/24/24  Impression:     1. No acute intracranial abnormality.  2. No large vessel occlusion.  3.  Partially imaged 3.8 cm circumscribed soft tissue density/ mass extending from the superior mediastinum to the right suprahilar region of unclear etiology.     % stenosis derived by comparing the narrowest segment with the distal luminal diameter as related to the reported measure of arterial narrowing.        Cardiac Imaging   ECHO 6/25/25    Left Ventricle: The left ventricle is normal in size. Normal wall thickness. There is normal systolic function. Quantitated ejection fraction is 62%. There is normal diastolic function.    Right Ventricle: The right ventricle is normal in size measuring 3.4 cm. Wall thickness is normal. Systolic function is normal.    Aortic Valve: There is mild aortic regurgitation.    Mitral Valve: Mildly thickened anterior leaflet of undetermined significance.  Consider KAREN for better evaluation of the mitral valve in the setting of stroke    Pulmonary Artery: The estimated pulmonary artery systolic pressure is 17 mmHg.    IVC/SVC: Normal venous pressure at 3 mmHg.    Study is negative for shunt.

## 2025-07-01 VITALS
TEMPERATURE: 98 F | WEIGHT: 133.38 LBS | HEIGHT: 64 IN | DIASTOLIC BLOOD PRESSURE: 75 MMHG | OXYGEN SATURATION: 98 % | BODY MASS INDEX: 22.77 KG/M2 | HEART RATE: 76 BPM | SYSTOLIC BLOOD PRESSURE: 128 MMHG | RESPIRATION RATE: 17 BRPM

## 2025-07-01 LAB
ABSOLUTE EOSINOPHIL (OHS): 0.05 K/UL
ABSOLUTE MONOCYTE (OHS): 0.39 K/UL (ref 0.3–1)
ABSOLUTE NEUTROPHIL COUNT (OHS): 2.2 K/UL (ref 1.8–7.7)
ALBUMIN SERPL BCP-MCNC: 3.8 G/DL (ref 3.5–5.2)
ALP SERPL-CCNC: 72 UNIT/L (ref 40–150)
ALT SERPL W/O P-5'-P-CCNC: 9 UNIT/L (ref 10–44)
ANION GAP (OHS): 8 MMOL/L (ref 8–16)
AST SERPL-CCNC: 16 UNIT/L (ref 11–45)
BASOPHILS # BLD AUTO: 0.05 K/UL
BASOPHILS NFR BLD AUTO: 1 %
BILIRUB SERPL-MCNC: 0.3 MG/DL (ref 0.1–1)
BUN SERPL-MCNC: 16 MG/DL (ref 6–20)
CALCIUM SERPL-MCNC: 9.5 MG/DL (ref 8.7–10.5)
CHLORIDE SERPL-SCNC: 103 MMOL/L (ref 95–110)
CO2 SERPL-SCNC: 27 MMOL/L (ref 23–29)
CREAT SERPL-MCNC: 0.6 MG/DL (ref 0.5–1.4)
ERYTHROCYTE [DISTWIDTH] IN BLOOD BY AUTOMATED COUNT: 13.3 % (ref 11.5–14.5)
GFR SERPLBLD CREATININE-BSD FMLA CKD-EPI: >60 ML/MIN/1.73/M2
GLUCOSE SERPL-MCNC: 92 MG/DL (ref 70–110)
HCT VFR BLD AUTO: 42.6 % (ref 37–48.5)
HGB BLD-MCNC: 14.1 GM/DL (ref 12–16)
IMM GRANULOCYTES # BLD AUTO: 0.01 K/UL (ref 0–0.04)
IMM GRANULOCYTES NFR BLD AUTO: 0.2 % (ref 0–0.5)
LYMPHOCYTES # BLD AUTO: 2.5 K/UL (ref 1–4.8)
MAGNESIUM SERPL-MCNC: 1.9 MG/DL (ref 1.6–2.6)
MCH RBC QN AUTO: 31 PG (ref 27–31)
MCHC RBC AUTO-ENTMCNC: 33.1 G/DL (ref 32–36)
MCV RBC AUTO: 94 FL (ref 82–98)
NUCLEATED RBC (/100WBC) (OHS): 0 /100 WBC
PHOSPHATE SERPL-MCNC: 4.2 MG/DL (ref 2.7–4.5)
PLATELET # BLD AUTO: 353 K/UL (ref 150–450)
PMV BLD AUTO: 10.8 FL (ref 9.2–12.9)
POTASSIUM SERPL-SCNC: 4.1 MMOL/L (ref 3.5–5.1)
PROT SERPL-MCNC: 7.7 GM/DL (ref 6–8.4)
RBC # BLD AUTO: 4.55 M/UL (ref 4–5.4)
RELATIVE EOSINOPHIL (OHS): 1 %
RELATIVE LYMPHOCYTE (OHS): 48.1 % (ref 18–48)
RELATIVE MONOCYTE (OHS): 7.5 % (ref 4–15)
RELATIVE NEUTROPHIL (OHS): 42.2 % (ref 38–73)
SODIUM SERPL-SCNC: 138 MMOL/L (ref 136–145)
WBC # BLD AUTO: 5.2 K/UL (ref 3.9–12.7)

## 2025-07-01 PROCEDURE — 92526 ORAL FUNCTION THERAPY: CPT

## 2025-07-01 PROCEDURE — 25000003 PHARM REV CODE 250: Performed by: PHYSICIAN ASSISTANT

## 2025-07-01 PROCEDURE — 97112 NEUROMUSCULAR REEDUCATION: CPT

## 2025-07-01 PROCEDURE — 36415 COLL VENOUS BLD VENIPUNCTURE: CPT | Performed by: PHYSICIAN ASSISTANT

## 2025-07-01 PROCEDURE — 92507 TX SP LANG VOICE COMM INDIV: CPT

## 2025-07-01 PROCEDURE — 85025 COMPLETE CBC W/AUTO DIFF WBC: CPT | Performed by: PHYSICIAN ASSISTANT

## 2025-07-01 PROCEDURE — 25000003 PHARM REV CODE 250: Performed by: NURSE PRACTITIONER

## 2025-07-01 PROCEDURE — 97535 SELF CARE MNGMENT TRAINING: CPT

## 2025-07-01 PROCEDURE — 83735 ASSAY OF MAGNESIUM: CPT | Performed by: PHYSICIAN ASSISTANT

## 2025-07-01 PROCEDURE — 99233 SBSQ HOSP IP/OBS HIGH 50: CPT | Mod: ,,, | Performed by: PSYCHIATRY & NEUROLOGY

## 2025-07-01 PROCEDURE — 99232 SBSQ HOSP IP/OBS MODERATE 35: CPT | Mod: ,,, | Performed by: NURSE PRACTITIONER

## 2025-07-01 PROCEDURE — 84100 ASSAY OF PHOSPHORUS: CPT | Performed by: PHYSICIAN ASSISTANT

## 2025-07-01 PROCEDURE — 80053 COMPREHEN METABOLIC PANEL: CPT | Performed by: PHYSICIAN ASSISTANT

## 2025-07-01 RX ORDER — LABETALOL HCL 20 MG/4 ML
10 SYRINGE (ML) INTRAVENOUS EVERY 6 HOURS PRN
Status: ON HOLD
Start: 2025-07-01

## 2025-07-01 RX ORDER — AMOXICILLIN 250 MG
1 CAPSULE ORAL 2 TIMES DAILY
Status: ON HOLD
Start: 2025-07-01

## 2025-07-01 RX ORDER — MUPIROCIN 20 MG/G
OINTMENT TOPICAL 2 TIMES DAILY
Status: ON HOLD
Start: 2025-07-01

## 2025-07-01 RX ORDER — ONDANSETRON HYDROCHLORIDE 2 MG/ML
4 INJECTION, SOLUTION INTRAVENOUS EVERY 8 HOURS PRN
Status: ON HOLD
Start: 2025-07-01

## 2025-07-01 RX ORDER — BISACODYL 10 MG/1
10 SUPPOSITORY RECTAL DAILY PRN
Status: ON HOLD
Start: 2025-07-01

## 2025-07-01 RX ORDER — LIDOCAINE 50 MG/G
1 PATCH TOPICAL DAILY PRN
Status: ON HOLD
Start: 2025-07-01

## 2025-07-01 RX ORDER — ENOXAPARIN SODIUM 100 MG/ML
40 INJECTION SUBCUTANEOUS DAILY
Status: ON HOLD
Start: 2025-07-01

## 2025-07-01 RX ORDER — ACETAMINOPHEN 325 MG/1
650 TABLET ORAL EVERY 6 HOURS PRN
Status: ON HOLD
Start: 2025-07-01

## 2025-07-01 RX ORDER — METHOCARBAMOL 500 MG/1
500 TABLET, FILM COATED ORAL EVERY 6 HOURS
Status: ON HOLD
Start: 2025-07-01 | End: 2025-07-11

## 2025-07-01 RX ORDER — MUPIROCIN 20 MG/G
OINTMENT TOPICAL 2 TIMES DAILY
Status: DISCONTINUED | OUTPATIENT
Start: 2025-07-01 | End: 2025-07-01 | Stop reason: HOSPADM

## 2025-07-01 RX ADMIN — METHOCARBAMOL 500 MG: 500 TABLET ORAL at 05:07

## 2025-07-01 RX ADMIN — ATORVASTATIN CALCIUM 40 MG: 40 TABLET, FILM COATED ORAL at 08:07

## 2025-07-01 RX ADMIN — MUPIROCIN: 20 OINTMENT TOPICAL at 09:07

## 2025-07-01 RX ADMIN — CLOPIDOGREL 75 MG: 75 TABLET ORAL at 08:07

## 2025-07-01 RX ADMIN — LIDOCAINE 1 PATCH: 50 PATCH CUTANEOUS at 05:07

## 2025-07-01 RX ADMIN — METHOCARBAMOL 500 MG: 500 TABLET ORAL at 12:07

## 2025-07-01 RX ADMIN — ASPIRIN 81 MG: 81 TABLET, COATED ORAL at 08:07

## 2025-07-01 NOTE — SUBJECTIVE & OBJECTIVE
Interval History 7/1/2025:  Patient is seen for follow-up PM&R evaluation and recommendations: Participated with OT today and therapy continues to rec high intensity. Also, worked with SLP this morning. Tolerating diet. Med stable and ready for transfer to rehab.     HPI, Past Medical, Family, and Social History remains the same as documented in the initial encounter.    Scheduled Medications:    aspirin  81 mg Oral Daily    atorvastatin  40 mg Oral Daily    clopidogreL  75 mg Oral Daily    enoxparin  40 mg Subcutaneous Daily    LIDOcaine  1 patch Transdermal Q24H    methocarbamoL  500 mg Oral Q6H    mupirocin   Nasal BID    senna-docusate  1 tablet Oral BID     Diagnostic Results:   Labs: Reviewed  ECG: Reviewed  CT: Reviewed    PRN Medications:   Current Facility-Administered Medications:     acetaminophen, 650 mg, Oral, Q6H PRN    bisacodyL, 10 mg, Rectal, Daily PRN    labetalol, 10 mg, Intravenous, Q6H PRN    ondansetron, 4 mg, Intravenous, Q8H PRN    sodium chloride 0.9%, 10 mL, Intravenous, PRN    Review of Systems   Constitutional:  Positive for activity change.   HENT:  Negative for hearing loss.    Respiratory:  Negative for cough and shortness of breath.    Gastrointestinal:  Negative for nausea and vomiting.   Musculoskeletal:  Positive for gait problem.   Neurological:  Positive for speech difficulty and weakness.   Psychiatric/Behavioral:  Positive for confusion.      Objective:     Vital Signs (Most Recent):  Temp: 98.4 °F (36.9 °C) (07/01/25 0740)  Pulse: 71 (07/01/25 0856)  Resp: 17 (07/01/25 0740)  BP: 119/76 (07/01/25 0740)  SpO2: 100 % (07/01/25 0740)    Vital Signs (24h Range):  Temp:  [98 °F (36.7 °C)-99.1 °F (37.3 °C)] 98.4 °F (36.9 °C)  Pulse:  [62-84] 71  Resp:  [17-18] 17  SpO2:  [96 %-100 %] 100 %  BP: (109-124)/(72-78) 119/76         Physical Exam  Vitals and nursing note reviewed.   Constitutional:       Appearance: Normal appearance.   HENT:      Mouth/Throat:      Mouth: Mucous  membranes are moist.   Eyes:      Extraocular Movements: Extraocular movements intact.      Pupils: Pupils are equal, round, and reactive to light.   Pulmonary:      Effort: Pulmonary effort is normal. No respiratory distress.   Musculoskeletal:      Comments: LSW   Skin:     General: Skin is warm.   Neurological:      Mental Status: She is alert.      Motor: Weakness present.      Gait: Gait abnormal.      Comments: Following commands  Confusion present   Psychiatric:         Mood and Affect: Mood normal.          NEUROLOGICAL EXAMINATION:     CRANIAL NERVES     CN III, IV, VI   Pupils are equal, round, and reactive to light.

## 2025-07-01 NOTE — PT/OT/SLP PROGRESS
"Speech Language Pathology Treatment    Patient Name:  Cadence Vasques   MRN:  [id number]  Admitting Diagnosis: Cerebrovascular accident (CVA) due to occlusion of right middle cerebral artery    Recommendations:                 General Recommendations:  Dysphagia therapy, Speech/language therapy, and Cognitive-linguistic therapy  Diet recommendations:  Easy to Chew Diet - IDDSI Level 7, Liquid Diet Level: Thin liquids - IDDSI Level 0   Aspiration Precautions: 1 bite/sip at a time, Alternating bites/sips, Check for pocketing/oral residue, Eliminate distractions, Feed only when awake/alert, HOB to 90 degrees, Meds whole 1 at a time, Small bites/sips, and Strict aspiration precautions   General Precautions: Standard, aspiration, fall  Communication strategies:  provide increased time to answer and go to room if call light pushed  Discharge recommendations:  High Intensity Therapy   Barriers to Discharge:  Level of Skilled Assistance Needed       Assessment:     Cadence Vasques is a 49 y.o. female with an SLP diagnosis of Dysphagia, Dysarthria, Cognitive-Linguistic Impairment, and Visio-Spatial Impairment.     Subjective     "When can I go to rehab?"     Pain/Comfort:  Pain Rating 1: 0/10  Pain Rating Post-Intervention 1: 0/10    Respiratory Status: Room air    Objective:     Has the patient been evaluated by SLP for swallowing?   Yes  Keep patient NPO? No     Pt seen bedside, alert and cooperative. She reports good tolerance of recent meals. Pt observed self feeding regular solids with thin liquid via cup. Cues provided to refrain from talking prior to oral clearance.  Mild anterior loss with mild pocketing on L noted. Pt able to clear IND. No overt s/s aspiration. Ox4 IND. Speech strategies reviewed and demonstrated.  Pt implemented during picture description task given min cues to slow rate and over-articulate. Min cues provided for pt to attend to L side of picture.  Details from scene recalled after 3 " min filled delay with 100% accy.  Simple L scanning task completed with 100% accy. R-L scanning noted. L attention strategies and safety considerations reviewed.  Pt verbalized understanding.       Goals:   Multidisciplinary Problems       SLP Goals          Problem: SLP    Goal Priority Disciplines Outcome   SLP Goal     SLP Progressing   Description: Speech Language Pathology Goals  Goals expected to be met by 7/10  1. Pt will participate in ongoing assessment of swallow.   2. Pt will implement simple speech strategies at sentence level with min cues.  3. Pt will complete higher level reasoning/deduction tasks with 90% accy.   4. Pt will complete simple L attention tasks with 90% accy with max cues.   5. Pt will complete assessment of reading, writing, visual spatial skills to determine need for tx.                                Plan:     Patient to be seen:  4 x/week   Plan of Care expires:  07/26/25  Plan of Care reviewed with:  patient   SLP Follow-Up:  Yes       Time Tracking:     SLP Treatment Date:   07/01/25  Speech Start Time:  0953  Speech Stop Time:  1010     Speech Total Time (min):  17 min    Billable Minutes: Speech Therapy Individual 9 and Treatment Swallowing Dysfunction 8    07/01/2025

## 2025-07-01 NOTE — ASSESSMENT & PLAN NOTE
- CTA revealed R M1 occlusion. Determined not a candidate for lytics or IR due to LKW being >24hrs prior.  -  MRI brain shows multifocal areas of moderate sized acute infarct involving the R MCA territory with localized mass effect but no MLS.  - Encourage mobility, OOB in chair at least 3 hours per day, and early ambulation as appropriate  - PT & OT recommending high intensity  - PM&R recommending inpatient rehab   - Monitor for and prevent skin breakdown and pressure ulcers

## 2025-07-01 NOTE — PROGRESS NOTES
Adrian Verma - Neurosurgery ([facility])  Physical Medicine & Rehab  Progress Note    Patient Name: Cadence Vasques  MRN: [id number]  Admission Date: 6/25/2025  Length of Stay: 6 days  Attending Physician: Pool Kenney MD    Subjective:     Principal Problem:Cerebrovascular accident (CVA) due to occlusion of right middle cerebral artery    Hospital Course:   6/30/25: Participated with OT. Bed mob SV- SBA. Sit to stand modA with no AD. Grooming modA. UBD maxA.   7/1/25: Participated with OT. Bed mob SBA. Sit <> Stand Transfer with moderate assistance  with  no assistive device. Grooming Christiano.     Interval History 7/1/2025:  Patient is seen for follow-up PM&R evaluation and recommendations: Participated with OT today and therapy continues to rec high intensity. Also, worked with SLP this morning. Tolerating diet. Med stable and ready for transfer to rehab.     HPI, Past Medical, Family, and Social History remains the same as documented in the initial encounter.    Scheduled Medications:    aspirin  81 mg Oral Daily    atorvastatin  40 mg Oral Daily    clopidogreL  75 mg Oral Daily    enoxparin  40 mg Subcutaneous Daily    LIDOcaine  1 patch Transdermal Q24H    methocarbamoL  500 mg Oral Q6H    mupirocin   Nasal BID    senna-docusate  1 tablet Oral BID     Diagnostic Results:   Labs: Reviewed  ECG: Reviewed  CT: Reviewed    PRN Medications:   Current Facility-Administered Medications:     acetaminophen, 650 mg, Oral, Q6H PRN    bisacodyL, 10 mg, Rectal, Daily PRN    labetalol, 10 mg, Intravenous, Q6H PRN    ondansetron, 4 mg, Intravenous, Q8H PRN    sodium chloride 0.9%, 10 mL, Intravenous, PRN    Review of Systems   Constitutional:  Positive for activity change.   HENT:  Negative for hearing loss.    Respiratory:  Negative for cough and shortness of breath.    Gastrointestinal:  Negative for nausea and vomiting.   Musculoskeletal:  Positive for gait problem.   Neurological:  Positive for speech difficulty and  weakness.   Psychiatric/Behavioral:  Positive for confusion.      Objective:     Vital Signs (Most Recent):  Temp: 98.4 °F (36.9 °C) (07/01/25 0740)  Pulse: 71 (07/01/25 0856)  Resp: 17 (07/01/25 0740)  BP: 119/76 (07/01/25 0740)  SpO2: 100 % (07/01/25 0740)    Vital Signs (24h Range):  Temp:  [98 °F (36.7 °C)-99.1 °F (37.3 °C)] 98.4 °F (36.9 °C)  Pulse:  [62-84] 71  Resp:  [17-18] 17  SpO2:  [96 %-100 %] 100 %  BP: (109-124)/(72-78) 119/76     Physical Exam  Vitals and nursing note reviewed.   Constitutional:       Appearance: Normal appearance.   HENT:      Mouth/Throat:      Mouth: Mucous membranes are moist.   Eyes:      Extraocular Movements: Extraocular movements intact.      Pupils: Pupils are equal, round, and reactive to light.   Pulmonary:      Effort: Pulmonary effort is normal. No respiratory distress.   Musculoskeletal:      Comments: LSW   Skin:     General: Skin is warm.   Neurological:      Mental Status: She is alert.      Motor: Weakness present.      Gait: Gait abnormal.      Comments: Following commands  Confusion present   Psychiatric:         Mood and Affect: Mood normal.     Assessment/Plan:      * Cerebrovascular accident (CVA) due to occlusion of right middle cerebral artery  - CTA revealed R M1 occlusion. Determined not a candidate for lytics or IR due to LKW being >24hrs prior.  -  MRI brain shows multifocal areas of moderate sized acute infarct involving the R MCA territory with localized mass effect but no MLS.  - Encourage mobility, OOB in chair at least 3 hours per day, and early ambulation as appropriate  - PT & OT recommending high intensity  - PM&R recommending inpatient rehab   - Monitor for and prevent skin breakdown and pressure ulcers     Hypokalemia  - now improved.     Cocaine use  - UDS + cocaine.     PM&R Recommendation:     At this time, the PM&R team has reviewed this patient's ongoing medical case including inpatient diagnosis, medical history, clinical examination, labs,  vitals, current social and functional history to provide the post-acute recommendation as follows:     RECOMMENDATIONS: inpatient rehabilitation due to good motivation/participation with therapies, has been determined to tolerate 3 hours of therapy and good potential for recovery.     The patient will be admitted for comprehensive interdisciplinary inpatient rehabilitation to address the impairments due to medical diagnosis of R MCA. The patient will benefit from an inpatient rehabilitation program to promote functional recovery, implement compensatory strategies and will undergo assessment for needs for durable medical equipment for safe discharge to the community. This patient will benefit from a coordinated interdisciplinary rehabilitation program services that require close monitoring and treatment with 24-hour rehabilitative nursing and physical/occupational therapies for 3 hours/day for 5 days/week.This interdisciplinary program will be performed under the direction of a physiatrist.    MEDICAL STABILITY:     At this time, no barriers for post-acute rehab admission.    I will sign off with the transfer of the patient to [facility] liaison who will continue to follow going forward until admission to [facility].      Leanne Moran NP  Department of Physical Medicine & Rehab   [facility] \[facility]\"")

## 2025-07-01 NOTE — CONSULTS
Adrian Verma - Neurosurgery ([facility])  Physical Medicine & Rehab  Consult Note    Patient Name: Cadence Vasques  MRN: [id number]  Admission Date: 2025  Hospital Length of Stay: 6 days  Attending Physician: Pool Kenney MD     Inpatient consult to Physical Medicine & Rehabilitation  Consult performed by: Leanne Moran NP  Consult requested by:  Pool Kenney MD    Collaborating Physician: Maura No MD  Reason for Consult:  Assess rehabilitation needs      Consults  Subjective:     Principal Problem: Cerebrovascular accident (CVA) due to occlusion of right middle cerebral artery    HPI: Cadence is a 49-year-old female with PMHx of HTN, tobacco use, substance abuse. Patient presented to [facility] on 25 as a transfer from [facility] for acute R MCA stroke. UDS + cocaine. CTA revealed R M1 occlusion. Determined not a candidate for lytics or IR due to LKW being >24hrs prior. MRI brain shows multifocal areas of moderate sized acute infarct involving the R MCA territory with localized mass effect but no MLS. Also, with hypokalemia and now improved.     Functional History: Patient lives with cousin in a single story home with 2 steps to enter. Prior to admission, I. DME: none.     Hospital Course: 25: Participated with OT. Bed mob SV- SBA. Sit to stand modA with no AD. Grooming modA. UBD maxA.     Past Medical History:   Diagnosis Date    Anemia     Fibroid (bleeding) (uterine)     GERD (gastroesophageal reflux disease)     Hypertension      Past Surgical History:   Procedure Laterality Date     SECTION       Review of patient's allergies indicates:   Allergen Reactions    Opioids - morphine analogues Hives and Itching     Reaction noted after re-assessment of getting Morphine.        Scheduled Medications:    aspirin  81 mg Oral Daily    atorvastatin  40 mg Oral Daily    clopidogreL  75 mg Oral Daily    enoxparin  40 mg Subcutaneous Daily    LIDOcaine  1 patch Transdermal Q24H     "methocarbamoL  500 mg Oral Q6H    mupirocin   Nasal BID    senna-docusate  1 tablet Oral BID       PRN Medications:   Current Facility-Administered Medications:     acetaminophen, 650 mg, Oral, Q6H PRN    bisacodyL, 10 mg, Rectal, Daily PRN    labetalol, 10 mg, Intravenous, Q6H PRN    ondansetron, 4 mg, Intravenous, Q8H PRN    sodium chloride 0.9%, 10 mL, Intravenous, PRN    Family History    None       Tobacco Use    Smoking status: Every Day     Current packs/day: 0.50     Types: Cigarettes    Smokeless tobacco: Not on file   Vaping Use    Vaping status: Every Day   Substance and Sexual Activity    Alcohol use: Yes     Comment: drinks beer once per week    Drug use: Yes     Types: "Crack" cocaine    Sexual activity: Not on file     Review of Systems   Constitutional:  Positive for activity change.   HENT:  Negative for hearing loss.    Respiratory:  Negative for cough and shortness of breath.    Gastrointestinal:  Negative for nausea and vomiting.   Musculoskeletal:  Positive for gait problem.   Neurological:  Positive for speech difficulty and weakness.   Psychiatric/Behavioral:  Positive for confusion.      Objective:     Vital Signs (Most Recent):  Temp: 98.4 °F (36.9 °C) (07/01/25 0740)  Pulse: 72 (07/01/25 0740)  Resp: 17 (07/01/25 0740)  BP: 119/76 (07/01/25 0740)  SpO2: 100 % (07/01/25 0740)    Vital Signs (24h Range):  Temp:  [98 °F (36.7 °C)-99.1 °F (37.3 °C)] 98.4 °F (36.9 °C)  Pulse:  [62-84] 72  Resp:  [17-18] 17  SpO2:  [96 %-100 %] 100 %  BP: (109-124)/(72-78) 119/76     Body mass index is 22.89 kg/m².     Physical Exam  Vitals and nursing note reviewed.   Constitutional:       Appearance: Normal appearance.   HENT:      Mouth/Throat:      Mouth: Mucous membranes are moist.   Eyes:      Extraocular Movements: Extraocular movements intact.      Pupils: Pupils are equal, round, and reactive to light.   Pulmonary:      Effort: Pulmonary effort is normal. No respiratory distress.   Musculoskeletal:      " Comments: LSW   Skin:     General: Skin is warm.   Neurological:      Mental Status: She is alert.      Motor: Weakness present.      Gait: Gait abnormal.      Comments: Following commands  Confusion present   Psychiatric:         Mood and Affect: Mood normal.     Diagnostic Results:   Labs: Reviewed  ECG: Reviewed  CT: Reviewed  MRI: Reviewed    Assessment/Plan:     * Cerebrovascular accident (CVA) due to occlusion of right middle cerebral artery  - CTA revealed R M1 occlusion. Determined not a candidate for lytics or IR due to LKW being >24hrs prior.  -  MRI brain shows multifocal areas of moderate sized acute infarct involving the R MCA territory with localized mass effect but no MLS.  - Encourage mobility, OOB in chair at least 3 hours per day, and early ambulation as appropriate  - PT & OT recommending high intensity  - PM&R recommending inpatient rehab   - Monitor for and prevent skin breakdown and pressure ulcers     Hypokalemia  - now improved.     Cocaine use  - UDS + cocaine.     PM&R Recommendation:     At this time, the PM&R team has reviewed this patient's ongoing medical case including inpatient diagnosis, medical history, clinical examination, labs, vitals, current social and functional history to provide the post-acute recommendation as follows:     RECOMMENDATIONS: inpatient rehabilitation due to good motivation/participation with therapies, has been determined to tolerate 3 hours of therapy and good potential for recovery.     The patient will be admitted for comprehensive interdisciplinary inpatient rehabilitation to address the impairments due to medical diagnosis of R MCA. The patient will benefit from an inpatient rehabilitation program to promote functional recovery, implement compensatory strategies and will undergo assessment for needs for durable medical equipment for safe discharge to the community. This patient will benefit from a coordinated interdisciplinary rehabilitation program  services that require close monitoring and treatment with 24-hour rehabilitative nursing and physical/occupational therapies for 3 hours/day for 5 days/week.This interdisciplinary program will be performed under the direction of a physiatrist.    MEDICAL STABILITY:     At this time, no barriers for post-acute rehab admission.    We will continue to follow.     Thank you for your consult.     Leanne Moran NP  Department of Physical Medicine & Rehab  [facility] [place])

## 2025-07-01 NOTE — ASSESSMENT & PLAN NOTE
Cadence Vasques is a 49 y.o. female with PMH of HTN, tobacco use (0.5 PPD), substance abuse that presents as a transfer from [facility] for higher level of care of acute R MCA stroke.  She initially presented to [facility] ED with LSW, LFD, and dysarthria. LKW >24hrs prior, as family reported seeing her 3-4 days prior with facial droop and dysarthria. Telestroke NIHSS 12. CTH with hypoattenuation c/w recent infarcts, CTA revealed R M1 occlusion. Determined not a candidate for lytics or IR due to LKW being >24hrs prior. She was transferred to [facility] for further eval and stroke workup. MRI brain shows multifocal areas of moderate sized acute infarct involving the R MCA territory with localized mass effect but no MLS. She was evaluated by gen neuro at [facility], given risk for development of edema that would require neurosurgical intervention decision made to transfer patient to [facility] for admission to [facility] for higher level of care and close neuro monitoring.   On admit to [facility] neuro exam significant for LUE/LLE hemiplegia, severe LFD, mod-severe dysarthria, R gaze preference but is able to cross midline/track to L. NIHSS 13. NSGY consulted for hemicrani watch.    06/30/2025 JACOB. Neuro exam stable. Patient drowsy, reports she did not sleep last night secondary to a noisy neighbor. Case management to begin authorization process for rehab.     07/01/2025. JACOB. Pt accepted for inpatient rehab. Will transfer today.       Antithrombotics for secondary stroke prevention: Antiplatelets: Aspirin: 81 mg daily  Clopidogrel: 75 mg daily    Statins for secondary stroke prevention and hyperlipidemia, if present: Statins: Atorvastatin- 40 mg daily    Aggressive risk factor modification: Smoking, Diet, Exercise     Rehab efforts: The patient has been evaluated by a stroke team provider and the therapy needs have been fully considered based off the presenting complaints and exam findings. The following therapy  evaluations are needed: PT evaluate and treat, OT evaluate and treat, SLP evaluate and treat HIT    Diagnostics ordered/pending: None     VTE prophylaxis: Heparin 5000 units SQ every 8 hours  Mechanical prophylaxis: Place SCDs    BP parameters: SBP <160

## 2025-07-01 NOTE — SUBJECTIVE & OBJECTIVE
Neurologic Chief Complaint: R MCA stroke    Subjective:     Interval History: Patient is seen for follow-up neurological assessment and treatment recommendations: See hospital course.     HPI, Past Medical, Family, and Social History remains the same as documented in the initial encounter.     Review of Systems   Neurological:  Positive for facial asymmetry, speech difficulty and weakness.     Scheduled Meds:   aspirin  81 mg Oral Daily    atorvastatin  40 mg Oral Daily    clopidogreL  75 mg Oral Daily    enoxparin  40 mg Subcutaneous Daily    LIDOcaine  1 patch Transdermal Q24H    methocarbamoL  500 mg Oral Q6H    mupirocin   Nasal BID    senna-docusate  1 tablet Oral BID     Continuous Infusions:  PRN Meds:  Current Facility-Administered Medications:     acetaminophen, 650 mg, Oral, Q6H PRN    bisacodyL, 10 mg, Rectal, Daily PRN    labetalol, 10 mg, Intravenous, Q6H PRN    ondansetron, 4 mg, Intravenous, Q8H PRN    sodium chloride 0.9%, 10 mL, Intravenous, PRN    Objective:     Vital Signs (Most Recent):  Temp: 98 °F (36.7 °C) (07/01/25 1130)  Pulse: 76 (07/01/25 1130)  Resp: 17 (07/01/25 1130)  BP: 128/75 (07/01/25 1130)  SpO2: 98 % (07/01/25 1130)  BP Location: Right arm    Vital Signs Range (Last 24H):  Temp:  [98 °F (36.7 °C)-99.1 °F (37.3 °C)]   Pulse:  [62-84]   Resp:  [17-18]   BP: (109-128)/(72-78)   SpO2:  [96 %-100 %]   BP Location: Right arm       Physical Exam  HENT:      Head: Normocephalic and atraumatic.      Mouth/Throat:      Mouth: Mucous membranes are moist.   Eyes:      Extraocular Movements: Extraocular movements intact.      Conjunctiva/sclera: Conjunctivae normal.   Cardiovascular:      Rate and Rhythm: Normal rate.   Pulmonary:      Effort: Pulmonary effort is normal.   Skin:     General: Skin is warm and dry.   Neurological:      Mental Status: She is alert and oriented to person, place, and time.      Motor: Weakness present.              Neurological Exam:   LOC: alert  Attention Span:  "Good   Language: No aphasia  Articulation: Dysarthria  Orientation: Person, Place, Time   EOM (CN III, IV, VI): Full/intact  Facial Movement (CN VII): Lower facial weakness on the Left  Motor: Arm left  Plegia 0/5  Leg left  Paresis: 1/5  Arm right  Normal 5/5  Leg right Normal 5/5    Laboratory:  CMP:   Recent Labs   Lab 07/01/25  0704   CALCIUM 9.5   ALBUMIN 3.8   PROT 7.7      K 4.1   CO2 27      BUN 16   CREATININE 0.6   ALKPHOS 72   ALT 9*   AST 16   BILITOT 0.3     BMP:   Recent Labs   Lab 07/01/25  0704      K 4.1      CO2 27   BUN 16   CREATININE 0.6   CALCIUM 9.5     CBC:   Recent Labs   Lab 07/01/25  0704   WBC 5.20   RBC 4.55   HGB 14.1   HCT 42.6      MCV 94   MCH 31.0   MCHC 33.1     Lipid Panel:   Recent Labs   Lab 06/24/25  1855   CHOL 236*   LDLCALC 152.4   HDL 74   TRIG 48     Coagulation:   Recent Labs   Lab 06/25/25  0544   INR 1.1   APTT 26.1     Platelet Aggregation Study: No results for input(s): "PLTAGG", "PLTAGINTERP", "PLTAGREGLACO", "ADPPLTAGGREG" in the last 168 hours.  Hgb A1C:   Recent Labs   Lab 06/25/25  0544   HGBA1C 5.5     TSH:   Recent Labs   Lab 06/24/25  1855   TSH 0.347*       Diagnostic Results      Brain Imaging   MRI BRAIN W/O CONTRAST 6/25/25  Impression:     1. Motion compromised, prematurely terminated study.  2. Corresponding to findings on prior CT/CTA imaging of 06/24/2025, multifocal areas of expansile restricted diffusion T2 weighted signal abnormality are noted throughout the right MCA territory in keeping with acute to early subacute infarct.  No definite macroscopic hemorrhage or significant mass effect at the present time.  3. Diminutive flow related signal of right MCA branches within the sylvian fissure, in keeping with distal M1 occlusion demonstrated on prior CTA performed 06/24/2025.  Serpiginous FLAIR hyperintense signal in this region in keeping with slow and/or disorganized flow due to the proximal occlusion.    CT HEAD W/O " CONTRAST 6/26/25  Impression:     Similar distribution of recent right MCA distribution infarcts without evidence of hemorrhagic conversion or midline shift.     No new major vascular distribution infarct.     8 mm partially calcified lesion with adjacent cyst formation in the region of the right choroid fissure/medial temporal lobe.  This is of unclear etiology and although other lesions are not excluded, a cavernous malformation to be considered.  MR imaging with contrast when the patient is stable would be helpful for further characterization.     Vessel Imaging   CTA HEAD AND NECK 6/24/24  Impression:     1. No acute intracranial abnormality.  2. No large vessel occlusion.  3.  Partially imaged 3.8 cm circumscribed soft tissue density/ mass extending from the superior mediastinum to the right suprahilar region of unclear etiology.     % stenosis derived by comparing the narrowest segment with the distal luminal diameter as related to the reported measure of arterial narrowing.        Cardiac Imaging   ECHO 6/25/25    Left Ventricle: The left ventricle is normal in size. Normal wall thickness. There is normal systolic function. Quantitated ejection fraction is 62%. There is normal diastolic function.    Right Ventricle: The right ventricle is normal in size measuring 3.4 cm. Wall thickness is normal. Systolic function is normal.    Aortic Valve: There is mild aortic regurgitation.    Mitral Valve: Mildly thickened anterior leaflet of undetermined significance.  Consider KAREN for better evaluation of the mitral valve in the setting of stroke    Pulmonary Artery: The estimated pulmonary artery systolic pressure is 17 mmHg.    IVC/SVC: Normal venous pressure at 3 mmHg.    Study is negative for shunt.

## 2025-07-01 NOTE — PROGRESS NOTES
Adrian Verma - Neurosurgery ([facility])  Vascular Neurology  Comprehensive Stroke Center  Progress Note    Assessment/Plan:     * Cerebrovascular accident (CVA) due to occlusion of right middle cerebral artery  Cadence Vasques is a 49 y.o. female with PMH of HTN, tobacco use (0.5 PPD), substance abuse that presents as a transfer from [facility] for higher level of care of acute R MCA stroke.  She initially presented to [facility] ED with LSW, LFD, and dysarthria. LKW >24hrs prior, as family reported seeing her 3-4 days prior with facial droop and dysarthria. Telestroke NIHSS 12. CTH with hypoattenuation c/w recent infarcts, CTA revealed R M1 occlusion. Determined not a candidate for lytics or IR due to LKW being >24hrs prior. She was transferred to [facility] for further eval and stroke workup. MRI brain shows multifocal areas of moderate sized acute infarct involving the R MCA territory with localized mass effect but no MLS. She was evaluated by gen neuro at [facility], given risk for development of edema that would require neurosurgical intervention decision made to transfer patient to [facility] for admission to [facility] for higher level of care and close neuro monitoring.   On admit to [facility] neuro exam significant for LUE/LLE hemiplegia, severe LFD, mod-severe dysarthria, R gaze preference but is able to cross midline/track to L. NIHSS 13. NSGY consulted for hemicrani watch.    06/30/2025 JACOB. Neuro exam stable. Patient drowsy, reports she did not sleep last night secondary to a noisy neighbor. Case management to begin authorization process for rehab.     07/01/2025. JACOB. Pt accepted for inpatient rehab. Will transfer today.       Antithrombotics for secondary stroke prevention: Antiplatelets: Aspirin: 81 mg daily  Clopidogrel: 75 mg daily    Statins for secondary stroke prevention and hyperlipidemia, if present: Statins: Atorvastatin- 40 mg daily    Aggressive risk factor modification: Smoking, Diet, Exercise      Rehab efforts: The patient has been evaluated by a stroke team provider and the therapy needs have been fully considered based off the presenting complaints and exam findings. The following therapy evaluations are needed: PT evaluate and treat, OT evaluate and treat, SLP evaluate and treat HIT    Diagnostics ordered/pending: None     VTE prophylaxis: Heparin 5000 units SQ every 8 hours  Mechanical prophylaxis: Place SCDs    BP parameters: SBP <160      Hypokalemia  -K 3.4, 6/28  - K 3.9    Essential hypertension  -Stroke risk factor  -SBP goal <160, maintain MAP >65  -BP range in the last 24 hrs: BP  Min: 114/76  Max: 129/83  -Current antihypertensive regimen:   --PRN labetalol/hydralazine      Cocaine use  -Stroke risk factor  -UDS + cocaine  - on cessation    Tobacco dependency  -Stroke risk factor  - on cessation  -Consider nicotine patch PRN  -Referral for smoking cessation at discharge if patient agreeable         06/26/2025  NAEON. CTH with similar distribution of recent right MCA distribution infarcts without evidence of hemorrhagic conversion or midline shift; No new major vascular distribution infarct. Neuro exam stable. Diet advanced to soft/bite sized. Continue to work with PT/OT.  06/27/2025 NAEON. Drowsy. Awakens to voice. LUE plegic. LLE strength improving. PT/OT recs HIT. Stepped down to NPU. Awaiting transfer.   06/28/2025 NAEON. Improved alertness. K 3.4. Replacement ordered. Dispo planning ongoing. PT/OT recs HIT.   06/29/2025 NAEON. Awakens to voice. Neuro exam stable. Dispo planning ongoing.  06/30/2025 NAEON. Neuro exam stable. Patient drowsy, reports she did not sleep last night secondary to a noisy neighbor. Case management to begin authorization process for rehab.   07/30/2025 NAEON. Pt approved for rehab. Plan to transfer today.     STROKE DOCUMENTATION        NIH Scale:  1a. Level of Consciousness: 0-->Alert, keenly responsive  1b. LOC Questions: 0-->Answers both questions  correctly  1c. LOC Commands: 0-->Performs both tasks correctly  2. Best Gaze: 0-->Normal  3. Visual: 0-->No visual loss  4. Facial Palsy: 2-->Partial paralysis (total or near-total paralysis of lower face)  5a. Motor Arm, Left: 4-->No movement  5b. Motor Arm, Right: 0-->No drift, limb holds 90 (or 45) degrees for full 10 secs  6a. Motor Leg, Left: 3-->No effort against gravity, leg falls to bed immediately  6b. Motor Leg, Right: 0-->No drift, leg holds 30 degree position for full 5 secs  7. Limb Ataxia: 0-->Absent  8. Sensory: 1-->Mild-to-moderate sensory loss, patient feels pinprick is less sharp or is dull on the affected side, or there is a loss of superficial pain with pinprick, but patient is aware of being touched  9. Best Language: 0-->No aphasia, normal  10. Dysarthria: 1-->Mild-to-moderate dysarthria, patient slurs at least some words and, at worst, can be understood with some difficulty  11. Extinction and Inattention (formerly Neglect): 0-->No abnormality  Total (NIH Stroke Scale): 11       Modified Woodland Score: 0  Julia Coma Scale:    ABCD2 Score:    [id number] Score:   HAS -BLED Score:   ICH Score:   Hunt & Jose Classification:      Hemorrhagic change of an Ischemic Stroke: Does this patient have an ischemic stroke with hemorrhagic changes? No     Neurologic Chief Complaint: R MCA stroke    Subjective:     Interval History: Patient is seen for follow-up neurological assessment and treatment recommendations: See hospital course.     HPI, Past Medical, Family, and Social History remains the same as documented in the initial encounter.     Review of Systems   Neurological:  Positive for facial asymmetry, speech difficulty and weakness.     Scheduled Meds:   aspirin  81 mg Oral Daily    atorvastatin  40 mg Oral Daily    clopidogreL  75 mg Oral Daily    enoxparin  40 mg Subcutaneous Daily    LIDOcaine  1 patch Transdermal Q24H    methocarbamoL  500 mg Oral Q6H    mupirocin   Nasal BID    senna-docusate   1 tablet Oral BID     Continuous Infusions:  PRN Meds:  Current Facility-Administered Medications:     acetaminophen, 650 mg, Oral, Q6H PRN    bisacodyL, 10 mg, Rectal, Daily PRN    labetalol, 10 mg, Intravenous, Q6H PRN    ondansetron, 4 mg, Intravenous, Q8H PRN    sodium chloride 0.9%, 10 mL, Intravenous, PRN    Objective:     Vital Signs (Most Recent):  Temp: 98 °F (36.7 °C) (07/01/25 1130)  Pulse: 76 (07/01/25 1130)  Resp: 17 (07/01/25 1130)  BP: 128/75 (07/01/25 1130)  SpO2: 98 % (07/01/25 1130)  BP Location: Right arm    Vital Signs Range (Last 24H):  Temp:  [98 °F (36.7 °C)-99.1 °F (37.3 °C)]   Pulse:  [62-84]   Resp:  [17-18]   BP: (109-128)/(72-78)   SpO2:  [96 %-100 %]   BP Location: Right arm       Physical Exam  HENT:      Head: Normocephalic and atraumatic.      Mouth/Throat:      Mouth: Mucous membranes are moist.   Eyes:      Extraocular Movements: Extraocular movements intact.      Conjunctiva/sclera: Conjunctivae normal.   Cardiovascular:      Rate and Rhythm: Normal rate.   Pulmonary:      Effort: Pulmonary effort is normal.   Skin:     General: Skin is warm and dry.   Neurological:      Mental Status: She is alert and oriented to person, place, and time.      Motor: Weakness present.              Neurological Exam:   LOC: alert  Attention Span: Good   Language: No aphasia  Articulation: Dysarthria  Orientation: Person, Place, Time   EOM (CN III, IV, VI): Full/intact  Facial Movement (CN VII): Lower facial weakness on the Left  Motor: Arm left  Plegia 0/5  Leg left  Paresis: 1/5  Arm right  Normal 5/5  Leg right Normal 5/5    Laboratory:  CMP:   Recent Labs   Lab 07/01/25  0704   CALCIUM 9.5   ALBUMIN 3.8   PROT 7.7      K 4.1   CO2 27      BUN 16   CREATININE 0.6   ALKPHOS 72   ALT 9*   AST 16   BILITOT 0.3     BMP:   Recent Labs   Lab 07/01/25  0704      K 4.1      CO2 27   BUN 16   CREATININE 0.6   CALCIUM 9.5     CBC:   Recent Labs   Lab 07/01/25  0704   WBC 5.20   RBC  "4.55   HGB 14.1   HCT 42.6      MCV 94   MCH 31.0   MCHC 33.1     Lipid Panel:   Recent Labs   Lab 06/24/25  1855   CHOL 236*   LDLCALC 152.4   HDL 74   TRIG 48     Coagulation:   Recent Labs   Lab 06/25/25  0544   INR 1.1   APTT 26.1     Platelet Aggregation Study: No results for input(s): "PLTAGG", "PLTAGINTERP", "PLTAGREGLACO", "ADPPLTAGGREG" in the last 168 hours.  Hgb A1C:   Recent Labs   Lab 06/25/25  0544   HGBA1C 5.5     TSH:   Recent Labs   Lab 06/24/25  1855   TSH 0.347*       Diagnostic Results      Brain Imaging   MRI BRAIN W/O CONTRAST 6/25/25  Impression:     1. Motion compromised, prematurely terminated study.  2. Corresponding to findings on prior CT/CTA imaging of 06/24/2025, multifocal areas of expansile restricted diffusion T2 weighted signal abnormality are noted throughout the right MCA territory in keeping with acute to early subacute infarct.  No definite macroscopic hemorrhage or significant mass effect at the present time.  3. Diminutive flow related signal of right MCA branches within the sylvian fissure, in keeping with distal M1 occlusion demonstrated on prior CTA performed 06/24/2025.  Serpiginous FLAIR hyperintense signal in this region in keeping with slow and/or disorganized flow due to the proximal occlusion.    CT HEAD W/O CONTRAST 6/26/25  Impression:     Similar distribution of recent right MCA distribution infarcts without evidence of hemorrhagic conversion or midline shift.     No new major vascular distribution infarct.     8 mm partially calcified lesion with adjacent cyst formation in the region of the right choroid fissure/medial temporal lobe.  This is of unclear etiology and although other lesions are not excluded, a cavernous malformation to be considered.  MR imaging with contrast when the patient is stable would be helpful for further characterization.     Vessel Imaging   CTA HEAD AND NECK 6/24/24  Impression:     1. No acute intracranial abnormality.  2. No " large vessel occlusion.  3.  Partially imaged 3.8 cm circumscribed soft tissue density/ mass extending from the superior mediastinum to the right suprahilar region of unclear etiology.     % stenosis derived by comparing the narrowest segment with the distal luminal diameter as related to the reported measure of arterial narrowing.        Cardiac Imaging   ECHO 6/25/25    Left Ventricle: The left ventricle is normal in size. Normal wall thickness. There is normal systolic function. Quantitated ejection fraction is 62%. There is normal diastolic function.    Right Ventricle: The right ventricle is normal in size measuring 3.4 cm. Wall thickness is normal. Systolic function is normal.    Aortic Valve: There is mild aortic regurgitation.    Mitral Valve: Mildly thickened anterior leaflet of undetermined significance.  Consider KAREN for better evaluation of the mitral valve in the setting of stroke    Pulmonary Artery: The estimated pulmonary artery systolic pressure is 17 mmHg.    IVC/SVC: Normal venous pressure at 3 mmHg.    Study is negative for shunt.       Dawson Guerra MD  Comprehensive Stroke Center  Department of Vascular Neurology   [facility])

## 2025-07-01 NOTE — PLAN OF CARE
[facility]    FACILITY TRANSFER ORDERS      Patient Name: Cadence Vasques  YOB: 1975    PCP: No, Primary Doctor   PCP Address: None  PCP Phone Number: None  PCP Fax: None    Encounter Date: 07/01/2025    Admit to: [facility]    Vital Signs:  Routine    Diagnoses:   Active Hospital Problems    Diagnosis  POA    *Cerebrovascular accident (CVA) due to occlusion of right middle cerebral artery [I63.511]  Yes    LUCINDA (obstructive sleep apnea) [G47.33]  Yes    Hypokalemia [E87.6]  No    Cocaine use [F14.90]  Yes    Tobacco dependency [F17.200]  Yes    Essential hypertension [I10]  Yes      Resolved Hospital Problems   No resolved problems to display.       Allergies:  Review of patient's allergies indicates:   Allergen Reactions    Opioids - morphine analogues Hives and Itching     Reaction noted after re-assessment of getting Morphine.        Diet: soft diet    Activities: Activity as tolerated    Goals of Care Treatment Preferences:  Code Status: Full Code      Nursing:  Per Facility protocol    Labs: Per Facility protocol    CONSULTS:    Physical Therapy to evaluate and treat.  and Occupational Therapy to evaluate and treat.    MISCELLANEOUS CARE:  Per Facility protocol    WOUND CARE ORDERS  None    Medications: Review discharge medications with patient and family and provide education.         Medication List        PAUSE taking these medications      IRON ORAL  Wait to take this until your doctor or other care provider tells you to start again.  Take by mouth.     omeprazole 20 MG capsule  Wait to take this until your doctor or other care provider tells you to start again.  Commonly known as: PRILOSEC  Take 20 mg by mouth once daily.            START taking these medications      acetaminophen 325 MG tablet  Commonly known as: TYLENOL  Take 2 tablets (650 mg total) by mouth every 6 (six) hours as needed.     bisacodyL 10 mg Supp  Commonly known as: DULCOLAX  Place 1 suppository  (10 mg total) rectally daily as needed (Until bowel movement if patient has no bowel movement for 2 days).     enoxaparin 40 mg/0.4 mL Syrg  Commonly known as: LOVENOX  Inject 0.4 mLs (40 mg total) into the skin once daily.     labetaloL 20 mg/4 mL (5 mg/mL) Syrg  Inject 2 mLs (10 mg total) into the vein every 6 (six) hours as needed (220).     LIDOcaine 5 %  Commonly known as: LIDODERM  Place 1 patch onto the skin daily as needed. Remove & Discard patch within 12 hours or as directed by MD     methocarbamoL 500 MG Tab  Commonly known as: Robaxin  Take 1 tablet (500 mg total) by mouth every 6 (six) hours. for 10 days     mupirocin 2 % ointment  Commonly known as: BACTROBAN  by Nasal route 2 (two) times daily.     ondansetron 4 mg/2 mL Soln  Inject 4 mg into the vein every 8 (eight) hours as needed (nausea).     senna-docusate 8.6-50 mg per tablet  Commonly known as: PERICOLACE  Take 1 tablet by mouth 2 (two) times daily.            CONTINUE taking these medications      aspirin 81 MG EC tablet  Commonly known as: ECOTRIN  Take 1 tablet (81 mg total) by mouth once daily.     atorvastatin 40 MG tablet  Commonly known as: LIPITOR  Take 1 tablet (40 mg total) by mouth once daily.     clopidogreL 75 mg tablet  Commonly known as: PLAVIX  Take 1 tablet (75 mg total) by mouth once daily. for 21 days                Immunizations Administered as of 7/1/2025       No immunizations on file.            This patient has had both covid vaccinations    Some patients may experience side effects after vaccination.  These may include fever, headache, muscle or joint aches.  Most symptoms resolve with 24-48 hours and do not require urgent medical evaluation unless they persist for more than 72 hours or symptoms are concerning for an unrelated medical condition.          _________________________________  Dawson Guerra MD  07/01/2025

## 2025-07-01 NOTE — PLAN OF CARE
Adrian Palencia - Neurosurgery (Hospital)  Discharge Final Note    Primary Care Provider: No, Primary Doctor    Expected Discharge Date: 7/2/2025    Final Discharge Note (most recent)       Final Note - 07/01/25 1247          Final Note    Assessment Type Final Discharge Note     Anticipated Discharge Disposition Rehab Facility     What phone number can be called within the next 1-3 days to see how you are doing after discharge? [phone]        Post-Acute Status    Post-Acute Authorization Placement     Post-Acute Placement Status Set-up Complete/Auth obtained                     Important Message from Medicare             After-discharge care                Destination       *[facility]   Service: Inpatient Rehabilitation    [address]  [place] [address]   Phone: [phone]                     Tresa Arrieta RN  Ext [phone]

## 2025-07-01 NOTE — DISCHARGE SUMMARY
Adrian Verma - Neurosurgery ([facility])  Vascular Neurology  Comprehensive Stroke Center  Discharge Summary     Summary:     Admit Date: 6/25/2025  1:30 PM    Discharge Date and Time: 07/01/2025 1:05 PM    Attending Physician: Pool Kenney MD     Discharge Provider: Dawson Guerra MD    History of Present Illness: Cadence Vasques is a 49 y.o. female with PMH of HTN, tobacco use (0.5 PPD), substance abuse that presents as a transfer from [facility] for higher level of care of acute R MCA stroke.  She initially presented to [facility] ED with LSW, LFD, and dysarthria. LKW >24hrs prior, as family reported seeing her 3-4 days prior with facial droop and dysarthria. Telestroke NIHSS 12. CTH with hypoattenuation c/w recent infarcts, CTA revealed R M1 occlusion. Determined not a candidate for lytics or IR due to LKW being >24hrs prior. She was transferred to [facility] for further eval and stroke workup. MRI brain shows multifocal areas of moderate sized acute infarct involving the R MCA territory with localized mass effect but no MLS. She was evaluated by gen neuro at [facility], given risk for development of edema that would require neurosurgical intervention decision made to transfer patient to [facility] for admission to [facility] for higher level of care and close neuro monitoring. On admit to [facility] neuro exam significant for LUE/LLE hemiplegia, severe LFD, mod-severe dysarthria, R gaze preference but is able to cross midline/track to L. NIHSS 13. NSGY consulted for hemicrani watch.    Hospital Course (synopsis of major diagnoses, care, treatment, and services provided during the course of the hospital stay): 06/26/2025  NAEON. CTH with similar distribution of recent right MCA distribution infarcts without evidence of hemorrhagic conversion or midline shift; No new major vascular distribution infarct. Neuro exam stable. Diet advanced to soft/bite sized. Continue to work with PT/OT.  06/27/2025 NAEON. Drowsy. Awakens to  voice. LUE plegic. LLE strength improving. PT/OT recs HIT. Stepped down to NPU. Awaiting transfer.   06/28/2025 NAEON. Improved alertness. K 3.4. Replacement ordered. Dispo planning ongoing. PT/OT recs HIT.   06/29/2025 NAEON. Awakens to voice. Neuro exam stable. Dispo planning ongoing.  06/30/2025 NAEON. Neuro exam stable. Patient drowsy, reports she did not sleep last night secondary to a noisy neighbor. Case management to begin authorization process for rehab.   07/30/2025 NAEON. Pt approved for rehab. Plan to transfer today.     Goals of Care Treatment Preferences:  Code Status: Full Code      Stroke Etiology: Ischemic Other Causes Recreational Drug: Cocaine    STROKE DOCUMENTATION         NIH Scale:  1a. Level of Consciousness: 0-->Alert, keenly responsive  1b. LOC Questions: 0-->Answers both questions correctly  1c. LOC Commands: 0-->Performs both tasks correctly  2. Best Gaze: 0-->Normal  3. Visual: 0-->No visual loss  4. Facial Palsy: 2-->Partial paralysis (total or near-total paralysis of lower face)  5a. Motor Arm, Left: 4-->No movement  5b. Motor Arm, Right: 0-->No drift, limb holds 90 (or 45) degrees for full 10 secs  6a. Motor Leg, Left: 3-->No effort against gravity, leg falls to bed immediately  6b. Motor Leg, Right: 0-->No drift, leg holds 30 degree position for full 5 secs  7. Limb Ataxia: 0-->Absent  8. Sensory: 1-->Mild-to-moderate sensory loss, patient feels pinprick is less sharp or is dull on the affected side, or there is a loss of superficial pain with pinprick, but patient is aware of being touched  9. Best Language: 0-->No aphasia, normal  10. Dysarthria: 1-->Mild-to-moderate dysarthria, patient slurs at least some words and, at worst, can be understood with some difficulty  11. Extinction and Inattention (formerly Neglect): 0-->No abnormality  Total (NIH Stroke Scale): 11        Modified Raúl Score: 0  Julia Coma Scale:    ABCD2 Score:    [id number] Score:   HAS -BLED Score:   ICH  Score:   Nichols & Jose Classification:       Assessment/Plan:     Diagnostic Results:      Brain Imaging   MRI BRAIN W/O CONTRAST 6/25/25  Impression:     1. Motion compromised, prematurely terminated study.  2. Corresponding to findings on prior CT/CTA imaging of 06/24/2025, multifocal areas of expansile restricted diffusion T2 weighted signal abnormality are noted throughout the right MCA territory in keeping with acute to early subacute infarct.  No definite macroscopic hemorrhage or significant mass effect at the present time.  3. Diminutive flow related signal of right MCA branches within the sylvian fissure, in keeping with distal M1 occlusion demonstrated on prior CTA performed 06/24/2025.  Serpiginous FLAIR hyperintense signal in this region in keeping with slow and/or disorganized flow due to the proximal occlusion.     CT HEAD W/O CONTRAST 6/26/25  Impression:     Similar distribution of recent right MCA distribution infarcts without evidence of hemorrhagic conversion or midline shift.     No new major vascular distribution infarct.     8 mm partially calcified lesion with adjacent cyst formation in the region of the right choroid fissure/medial temporal lobe.  This is of unclear etiology and although other lesions are not excluded, a cavernous malformation to be considered.  MR imaging with contrast when the patient is stable would be helpful for further characterization.     Vessel Imaging   CTA HEAD AND NECK 6/24/24  Impression:     1. No acute intracranial abnormality.  2. No large vessel occlusion.  3.  Partially imaged 3.8 cm circumscribed soft tissue density/ mass extending from the superior mediastinum to the right suprahilar region of unclear etiology.     % stenosis derived by comparing the narrowest segment with the distal luminal diameter as related to the reported measure of arterial narrowing.        Cardiac Imaging   ECHO 6/25/25    Left Ventricle: The left ventricle is normal in size. Normal  wall thickness. There is normal systolic function. Quantitated ejection fraction is 62%. There is normal diastolic function.    Right Ventricle: The right ventricle is normal in size measuring 3.4 cm. Wall thickness is normal. Systolic function is normal.    Aortic Valve: There is mild aortic regurgitation.    Mitral Valve: Mildly thickened anterior leaflet of undetermined significance.  Consider KAREN for better evaluation of the mitral valve in the setting of stroke    Pulmonary Artery: The estimated pulmonary artery systolic pressure is 17 mmHg.    IVC/SVC: Normal venous pressure at 3 mmHg.    Study is negative for shunt.    Interventions: None    Complications: None    Disposition:     Final Active Diagnoses:    Diagnosis Date Noted POA    PRINCIPAL PROBLEM:  Cerebrovascular accident (CVA) due to occlusion of right middle cerebral artery [I63.511] 06/25/2025 Yes    LUCINDA (obstructive sleep apnea) [G47.33] 06/26/2025 Yes    Hypokalemia [E87.6] 06/26/2025 No    Cocaine use [F14.90] 06/25/2025 Yes    Tobacco dependency [F17.200] 06/25/2025 Yes    Essential hypertension [I10] 06/25/2025 Yes      Problems Resolved During this Admission:     No new Assessment & Plan notes have been filed under this hospital service since the last note was generated.  Service: Vascular Neurology      Recommendations:     Post-discharge complication risks: Falls    Stroke Education given to: patient    Follow-up in Stroke Clinic in 4-6 weeks.     Discharge Plan:  Antithrombotics: Aspirin 81mg, Clopidogrel 75mg  Statin: Atorvastatin 40mg  Smoking Cessation    Follow Up:   Contact information for after-discharge care       Destination       [facility] .    Service: Inpatient Rehabilitation  Contact information:  [address]  [facility] [id number] [phone]                                   Patient Instructions:   No discharge procedures on file.    Medications:  Transfer Medications (for  Discharge Readmit only): Current Medications[1]    Dawson Guerra MD  [facility]  Department of Vascular Neurology   [facility])          [1]   Current Facility-Administered Medications   Medication Dose Route Frequency Provider Last Rate Last Admin    acetaminophen tablet 650 mg  650 mg Oral Q6H PRN Samantha Tinoco PA-C   650 mg at 06/28/25 1103    aspirin EC tablet 81 mg  81 mg Oral Daily Samantha Tinoco PA-C   81 mg at 07/01/25 0851    atorvastatin tablet 40 mg  40 mg Oral Daily Samantha Tinoco, PA-C   40 mg at 07/01/25 0851    bisacodyL suppository 10 mg  10 mg Rectal Daily PRN Samantha Tinoco PA-C        clopidogreL tablet 75 mg  75 mg Oral Daily Samantha Tinoco PA-C   75 mg at 07/01/25 0851    enoxaparin injection 40 mg  40 mg Subcutaneous Daily Samantha Tinoco PA-C   40 mg at 06/30/25 1706    labetalol 20 mg/4 mL (5 mg/mL) IV syring  10 mg Intravenous Q6H PRN Samantha Tinoco PA-C        LIDOcaine 5 % patch 1 patch  1 patch Transdermal Q24H Samantha Tinoco PA-C   1 patch at 07/01/25 0515    methocarbamoL tablet 500 mg  500 mg Oral Q6H Samantha Tinoco PA-C   500 mg at 07/01/25 1221    mupirocin 2 % ointment   Nasal BID Chitra Rdz FNP-C   Given at 07/01/25 0900    ondansetron injection 4 mg  4 mg Intravenous Q8H PRN Samantha Tinoco PA-C   4 mg at 06/26/25 0549    senna-docusate 8.6-50 mg per tablet 1 tablet  1 tablet Oral BID Samantha Tinoco PA-C   1 tablet at 06/29/25 2111    sodium chloride 0.9% flush 10 mL  10 mL Intravenous PRN Samantha Tinoco PA-C

## 2025-07-01 NOTE — SUBJECTIVE & OBJECTIVE
"Past Medical History:   Diagnosis Date    Anemia     Fibroid (bleeding) (uterine)     GERD (gastroesophageal reflux disease)     Hypertension      Past Surgical History:   Procedure Laterality Date     SECTION       Review of patient's allergies indicates:   Allergen Reactions    Opioids - morphine analogues Hives and Itching     Reaction noted after re-assessment of getting Morphine.        Scheduled Medications:    aspirin  81 mg Oral Daily    atorvastatin  40 mg Oral Daily    clopidogreL  75 mg Oral Daily    enoxparin  40 mg Subcutaneous Daily    LIDOcaine  1 patch Transdermal Q24H    methocarbamoL  500 mg Oral Q6H    mupirocin   Nasal BID    senna-docusate  1 tablet Oral BID       PRN Medications:   Current Facility-Administered Medications:     acetaminophen, 650 mg, Oral, Q6H PRN    bisacodyL, 10 mg, Rectal, Daily PRN    labetalol, 10 mg, Intravenous, Q6H PRN    ondansetron, 4 mg, Intravenous, Q8H PRN    sodium chloride 0.9%, 10 mL, Intravenous, PRN    Family History    None       Tobacco Use    Smoking status: Every Day     Current packs/day: 0.50     Types: Cigarettes    Smokeless tobacco: Not on file   Vaping Use    Vaping status: Every Day   Substance and Sexual Activity    Alcohol use: Yes     Comment: drinks beer once per week    Drug use: Yes     Types: "Crack" cocaine    Sexual activity: Not on file     Review of Systems   Constitutional:  Positive for activity change.   HENT:  Negative for hearing loss.    Respiratory:  Negative for cough and shortness of breath.    Gastrointestinal:  Negative for nausea and vomiting.   Musculoskeletal:  Positive for gait problem.   Neurological:  Positive for speech difficulty and weakness.   Psychiatric/Behavioral:  Positive for confusion.      Objective:     Vital Signs (Most Recent):  Temp: 98.4 °F (36.9 °C) (25)  Pulse: 72 (25)  Resp: 17 (25)  BP: 119/76 (25)  SpO2: 100 % (25)    Vital Signs (24h " Range):  Temp:  [98 °F (36.7 °C)-99.1 °F (37.3 °C)] 98.4 °F (36.9 °C)  Pulse:  [62-84] 72  Resp:  [17-18] 17  SpO2:  [96 %-100 %] 100 %  BP: (109-124)/(72-78) 119/76     Body mass index is 22.89 kg/m².     Physical Exam  Vitals and nursing note reviewed.   Constitutional:       Appearance: Normal appearance.   HENT:      Mouth/Throat:      Mouth: Mucous membranes are moist.   Eyes:      Extraocular Movements: Extraocular movements intact.      Pupils: Pupils are equal, round, and reactive to light.   Pulmonary:      Effort: Pulmonary effort is normal. No respiratory distress.   Musculoskeletal:      Comments: LSW   Skin:     General: Skin is warm.   Neurological:      Mental Status: She is alert.      Motor: Weakness present.      Gait: Gait abnormal.      Comments: Following commands  Confusion present   Psychiatric:         Mood and Affect: Mood normal.          NEUROLOGICAL EXAMINATION:     CRANIAL NERVES     CN III, IV, VI   Pupils are equal, round, and reactive to light.      Diagnostic Results: Labs: Reviewed  ECG: Reviewed  CT: Reviewed  MRI: Reviewed

## 2025-07-01 NOTE — PLAN OF CARE
Message received from [facility] informing this CM that they have insurance authorization and they are ready for the patient. W/C van transportation arranged for an estimated  time of 13:30PM. The nurse can call report to [phone]. Call placed to patient's sister Dorothea ([phone]) and she was informed of the above.    Tresa Arrieta RN  Ext [phone]

## 2025-07-01 NOTE — CARE UPDATE
Unit [facility] Care Support Interaction      I have reviewed the chart of Cadence Vasques who is hospitalized for Cerebrovascular accident (CVA) due to occlusion of right middle cerebral artery. The patient is currently located in the following unit: [facility]        I have assisted the primary physician in management of the following:      MRSA Decolonization - Mupirocin ordered            RONY Serrano  Unit Based [facility]

## 2025-07-01 NOTE — PT/OT/SLP PROGRESS
"Occupational Therapy   Treatment    Name: Cadence Vasques  MRN: [id number]  Admitting Diagnosis:  Cerebrovascular accident (CVA) due to occlusion of right middle cerebral artery       Recommendations:     Discharge Recommendations: High Intensity Therapy  Discharge Equipment Recommendations:  bath bench, bedside commode  Barriers to discharge:  None    Assessment:     Cadence Vasques is a 49 y.o. female with a medical diagnosis of Cerebrovascular accident (CVA) due to occlusion of right middle cerebral artery.  She presents with performance deficits affecting function are impaired endurance, impaired balance, impaired self care skills, impaired functional mobility.     Rehab Prognosis:  Good; patient would benefit from acute skilled OT services to address these deficits and reach maximum level of function.       Plan:     Patient to be seen 4 x/week to address the above listed problems via cognitive retraining, neuromuscular re-education, therapeutic exercises, therapeutic activities, self-care/home management  Plan of Care Expires: 07/24/25  Plan of Care Reviewed with: patient    Subjective     Patient:  "I've been up since 4 from the randy yelling across the nicole."  Pain/Comfort:  Pain Rating 1: 0/10  Pain Rating Post-Intervention 1: 0/10    Objective:     Communicated with: nurse prior to session.  Patient found supine with bed alarm, telemetry, pressure relief boots, SCD upon OT entry to room.    General Precautions: Standard, aspiration, fall    Orthopedic Precautions:N/A  Braces: N/A  Respiratory Status: Room air     Occupational Performance:     Bed Mobility:    Patient completed Rolling/Turning to Left with  stand by assistance  Patient completed Rolling/Turning to Right with stand by assistance  Patient completed Supine to Sit with stand by assistance  Patient completed Sit to Supine with stand by assistance     Functional Mobility/Transfers:  Patient completed Sit <> Stand Transfer with moderate " assistance  with  no assistive device     Activities of Daily Living:  Grooming: minimum assistance with left UE in weight bearing  Upper Body Dressing: moderate assistance while seated EOB  Lower Body Dressing: maximal assistance while seated EOB    [facility] 6 Click ADL: 14    Treatment & Education:  Patient alert and oriented x 3; able to follow 4/4 one step commands.  Patient attentive and interactive throughout the session.  Addressed L UE PROM one set x 10 rep in all planes of motion; followed by left UE weight bearing.  Addressed oral motor exercises while seated EOB.     Patient left supine with all lines intact, call button in reach, and bed alarm on    GOALS:   Multidisciplinary Problems       Occupational Therapy Goals          Problem: Occupational Therapy    Goal Priority Disciplines Outcome Interventions   Occupational Therapy Goal     OT, PT/OT Progressing    Description: Goals set 6/26 with an expiration date, 7/24:  Patient will increase functional independence with ADLs by performing:    Patient will demonstrate rolling to the right with min assist.  Not met   Patient will demonstrate rolling to the left with modified independence.   Not met  Patient will demonstrate supine -sit with min assist.   Not met  Patient will demonstrate stand pivot transfers with min assist.   Not met  Patient will demonstrate grooming while standing with min assist.   Not met  Patient will demonstrate upper body dressing with min assist while seated EOB.   Not met  Patient will demonstrate lower body dressing with mod assist while seated EOB.   Not met  Patient will demonstrate toileting with mod assist.   Not met  Patient will demonstrate bathing while seated EOB with mod assist.   Not met  Patient's family / caregiver will demonstrate independence and safety with assisting patient with self-care skills and functional mobility.     Not met  Patient's family / caregiver will demonstrate independence with providing ROM and  changes in bed positioning.   Not met  Patient and/or patient's family will verbalize understanding of stroke prevention guidelines, personal risk factors and stroke warning signs via teachback method.  Not met                                  Time Tracking:     OT Date of Treatment: 07/01/25  OT Start Time: 0712  OT Stop Time: 0736  OT Total Time (min): 24 min    Billable Minutes:Self Care/Home Management 12  Neuromuscular Re-education 12    OT/ADRIANA: OT     Number of ADRIANA visits since last OT visit: 0    7/1/2025

## 2025-07-01 NOTE — NURSING
Report called and given to Tootie at [facility] @8830. Pt d/c with belongings, IV and tele removed

## 2025-07-11 NOTE — PHYSICIAN QUERY
Please clarify the diagnosis of cerebral edema after study:   Clinically significant diagnosis that was monitored and/or treated as follows (please specify): evaluation in the neuro ICU

## 2025-07-18 DIAGNOSIS — I63.311 CEREBRAL INFARCTION DUE TO THROMBOSIS OF RIGHT MIDDLE CEREBRAL ARTERY: Primary | ICD-10-CM
